# Patient Record
Sex: FEMALE | Race: WHITE | Employment: OTHER | URBAN - NONMETROPOLITAN AREA
[De-identification: names, ages, dates, MRNs, and addresses within clinical notes are randomized per-mention and may not be internally consistent; named-entity substitution may affect disease eponyms.]

---

## 2017-05-10 ENCOUNTER — TELEPHONE (OUTPATIENT)
Dept: GENERAL RADIOLOGY | Age: 67
End: 2017-05-10

## 2017-05-11 DIAGNOSIS — Z12.31 VISIT FOR SCREENING MAMMOGRAM: Primary | ICD-10-CM

## 2017-05-16 ENCOUNTER — OFFICE VISIT (OUTPATIENT)
Dept: OBGYN | Age: 67
End: 2017-05-16
Payer: COMMERCIAL

## 2017-05-16 ENCOUNTER — HOSPITAL ENCOUNTER (OUTPATIENT)
Age: 67
Setting detail: SPECIMEN
Discharge: HOME | End: 2017-05-16
Payer: MEDICARE

## 2017-05-16 VITALS
HEIGHT: 65 IN | WEIGHT: 170.8 LBS | BODY MASS INDEX: 28.46 KG/M2 | DIASTOLIC BLOOD PRESSURE: 62 MMHG | SYSTOLIC BLOOD PRESSURE: 118 MMHG | HEART RATE: 82 BPM

## 2017-05-16 DIAGNOSIS — Z01.419 WELL FEMALE EXAM WITH ROUTINE GYNECOLOGICAL EXAM: Primary | ICD-10-CM

## 2017-05-16 DIAGNOSIS — Z12.4 CERVICAL CANCER SCREENING: ICD-10-CM

## 2017-05-16 DIAGNOSIS — Z12.31 SCREENING MAMMOGRAM, ENCOUNTER FOR: ICD-10-CM

## 2017-05-16 PROCEDURE — 99397 PER PM REEVAL EST PAT 65+ YR: CPT | Performed by: NURSE PRACTITIONER

## 2017-05-16 RX ORDER — MEDROXYPROGESTERONE ACETATE 2.5 MG/1
TABLET ORAL
Qty: 90 TABLET | Refills: 4 | Status: SHIPPED | OUTPATIENT
Start: 2017-05-16 | End: 2020-05-26 | Stop reason: ALTCHOICE

## 2017-05-16 RX ORDER — ESTRADIOL 0.5 MG/1
TABLET ORAL
Qty: 90 TABLET | Refills: 4 | Status: SHIPPED | OUTPATIENT
Start: 2017-05-16 | End: 2020-05-26 | Stop reason: ALTCHOICE

## 2017-05-22 LAB — CYTOLOGY REPORT: NORMAL

## 2017-05-30 ENCOUNTER — TELEPHONE (OUTPATIENT)
Dept: GENERAL RADIOLOGY | Age: 67
End: 2017-05-30

## 2017-06-02 ENCOUNTER — INITIAL CONSULT (OUTPATIENT)
Dept: NEUROSURGERY | Age: 67
End: 2017-06-02
Payer: COMMERCIAL

## 2017-06-02 VITALS
HEART RATE: 66 BPM | HEIGHT: 66 IN | SYSTOLIC BLOOD PRESSURE: 112 MMHG | BODY MASS INDEX: 27.48 KG/M2 | DIASTOLIC BLOOD PRESSURE: 67 MMHG | WEIGHT: 171 LBS

## 2017-06-02 DIAGNOSIS — R93.89 ABNORMAL PELVIC ULTRASOUND: Primary | ICD-10-CM

## 2017-06-02 DIAGNOSIS — M54.16 LUMBAR RADICULOPATHY: Primary | ICD-10-CM

## 2017-06-02 PROCEDURE — 99243 OFF/OP CNSLTJ NEW/EST LOW 30: CPT | Performed by: NEUROLOGICAL SURGERY

## 2017-06-02 ASSESSMENT — VISUAL ACUITY: VA_NORMAL: 1

## 2017-06-14 DIAGNOSIS — Z01.818 PREOP TESTING: ICD-10-CM

## 2017-06-14 LAB
-: NORMAL
AMORPHOUS: NORMAL
BACTERIA: NORMAL
BILIRUBIN URINE: NEGATIVE
CASTS UA: NORMAL /LPF (ref 0–2)
COLOR: NORMAL
COMMENT UA: NORMAL
CRYSTALS, UA: NORMAL /HPF
EPITHELIAL CELLS UA: NORMAL /HPF (ref 0–5)
GLUCOSE URINE: NEGATIVE
KETONES, URINE: NEGATIVE
LEUKOCYTE ESTERASE, URINE: NEGATIVE
MUCUS: NORMAL
NITRITE, URINE: NEGATIVE
OTHER OBSERVATIONS UA: NORMAL
PH UA: 6 (ref 5–6)
PROTEIN UA: NEGATIVE
RBC UA: NORMAL /HPF (ref 0–4)
RENAL EPITHELIAL, UA: NORMAL /HPF
SPECIFIC GRAVITY UA: 1.02 (ref 1.01–1.02)
TRICHOMONAS: NORMAL
TURBIDITY: NORMAL
URINE HGB: NEGATIVE
UROBILINOGEN, URINE: NORMAL
WBC UA: NORMAL /HPF (ref 0–4)
YEAST: NORMAL

## 2017-06-14 PROCEDURE — 93040 RHYTHM ECG WITH REPORT: CPT | Performed by: INTERNAL MEDICINE

## 2017-06-20 ENCOUNTER — ANESTHESIA EVENT (OUTPATIENT)
Dept: OPERATING ROOM | Age: 67
DRG: 516 | End: 2017-06-20
Payer: COMMERCIAL

## 2017-06-20 RX ORDER — DOXYCYCLINE HYCLATE 100 MG/1
100 CAPSULE ORAL 2 TIMES DAILY
Status: ON HOLD | COMMUNITY
End: 2017-06-23 | Stop reason: HOSPADM

## 2017-06-21 ENCOUNTER — HOSPITAL ENCOUNTER (INPATIENT)
Age: 67
LOS: 2 days | Discharge: HOME | DRG: 516 | End: 2017-06-23
Attending: NEUROLOGICAL SURGERY | Admitting: NEUROLOGICAL SURGERY
Payer: COMMERCIAL

## 2017-06-21 ENCOUNTER — ANESTHESIA (OUTPATIENT)
Dept: OPERATING ROOM | Age: 67
DRG: 516 | End: 2017-06-21
Payer: COMMERCIAL

## 2017-06-21 ENCOUNTER — APPOINTMENT (OUTPATIENT)
Dept: GENERAL RADIOLOGY | Age: 67
DRG: 516 | End: 2017-06-21
Attending: NEUROLOGICAL SURGERY
Payer: COMMERCIAL

## 2017-06-21 VITALS — DIASTOLIC BLOOD PRESSURE: 58 MMHG | SYSTOLIC BLOOD PRESSURE: 114 MMHG | OXYGEN SATURATION: 100 % | TEMPERATURE: 94.8 F

## 2017-06-21 PROCEDURE — 63030 LAMOT DCMPRN NRV RT 1 LMBR: CPT | Performed by: NEUROLOGICAL SURGERY

## 2017-06-21 PROCEDURE — 6370000000 HC RX 637 (ALT 250 FOR IP): Performed by: NEUROLOGICAL SURGERY

## 2017-06-21 PROCEDURE — 00Q20ZZ REPAIR DURA MATER, OPEN APPROACH: ICD-10-PCS | Performed by: NEUROLOGICAL SURGERY

## 2017-06-21 PROCEDURE — 2720000003 HC MISC SUTURE/STAPLES/RELOADS/ETC: Performed by: NEUROLOGICAL SURGERY

## 2017-06-21 PROCEDURE — C1729 CATH, DRAINAGE: HCPCS | Performed by: NEUROLOGICAL SURGERY

## 2017-06-21 PROCEDURE — 6360000002 HC RX W HCPCS: Performed by: ANESTHESIOLOGY

## 2017-06-21 PROCEDURE — 2580000003 HC RX 258: Performed by: ANESTHESIOLOGY

## 2017-06-21 PROCEDURE — 2720000010 HC SURG SUPPLY STERILE: Performed by: NEUROLOGICAL SURGERY

## 2017-06-21 PROCEDURE — 2500000003 HC RX 250 WO HCPCS: Performed by: NURSE ANESTHETIST, CERTIFIED REGISTERED

## 2017-06-21 PROCEDURE — 3600000004 HC SURGERY LEVEL 4 BASE: Performed by: NEUROLOGICAL SURGERY

## 2017-06-21 PROCEDURE — 7100000000 HC PACU RECOVERY - FIRST 15 MIN: Performed by: NEUROLOGICAL SURGERY

## 2017-06-21 PROCEDURE — 7100000001 HC PACU RECOVERY - ADDTL 15 MIN: Performed by: NEUROLOGICAL SURGERY

## 2017-06-21 PROCEDURE — 2500000003 HC RX 250 WO HCPCS: Performed by: NEUROLOGICAL SURGERY

## 2017-06-21 PROCEDURE — 3700000001 HC ADD 15 MINUTES (ANESTHESIA): Performed by: NEUROLOGICAL SURGERY

## 2017-06-21 PROCEDURE — 1200000000 HC SEMI PRIVATE

## 2017-06-21 PROCEDURE — 6360000002 HC RX W HCPCS: Performed by: NURSE ANESTHETIST, CERTIFIED REGISTERED

## 2017-06-21 PROCEDURE — 6360000002 HC RX W HCPCS

## 2017-06-21 PROCEDURE — 2580000003 HC RX 258: Performed by: NEUROLOGICAL SURGERY

## 2017-06-21 PROCEDURE — 72020 X-RAY EXAM OF SPINE 1 VIEW: CPT

## 2017-06-21 PROCEDURE — 87086 URINE CULTURE/COLONY COUNT: CPT

## 2017-06-21 PROCEDURE — 3600000014 HC SURGERY LEVEL 4 ADDTL 15MIN: Performed by: NEUROLOGICAL SURGERY

## 2017-06-21 PROCEDURE — 01NB0ZZ RELEASE LUMBAR NERVE, OPEN APPROACH: ICD-10-PCS | Performed by: NEUROLOGICAL SURGERY

## 2017-06-21 PROCEDURE — 3700000000 HC ANESTHESIA ATTENDED CARE: Performed by: NEUROLOGICAL SURGERY

## 2017-06-21 PROCEDURE — 6360000002 HC RX W HCPCS: Performed by: NEUROLOGICAL SURGERY

## 2017-06-21 RX ORDER — LIDOCAINE HYDROCHLORIDE 10 MG/ML
INJECTION, SOLUTION EPIDURAL; INFILTRATION; INTRACAUDAL; PERINEURAL PRN
Status: DISCONTINUED | OUTPATIENT
Start: 2017-06-21 | End: 2017-06-21 | Stop reason: SDUPTHER

## 2017-06-21 RX ORDER — ACETAMINOPHEN 325 MG/1
650 TABLET ORAL EVERY 4 HOURS PRN
Status: DISCONTINUED | OUTPATIENT
Start: 2017-06-21 | End: 2017-06-23 | Stop reason: HOSPADM

## 2017-06-21 RX ORDER — NEOSTIGMINE METHYLSULFATE 1 MG/ML
INJECTION, SOLUTION INTRAVENOUS PRN
Status: DISCONTINUED | OUTPATIENT
Start: 2017-06-21 | End: 2017-06-21 | Stop reason: SDUPTHER

## 2017-06-21 RX ORDER — SODIUM CHLORIDE 0.9 % (FLUSH) 0.9 %
10 SYRINGE (ML) INJECTION EVERY 12 HOURS SCHEDULED
Status: DISCONTINUED | OUTPATIENT
Start: 2017-06-21 | End: 2017-06-23 | Stop reason: HOSPADM

## 2017-06-21 RX ORDER — OXYCODONE HYDROCHLORIDE AND ACETAMINOPHEN 5; 325 MG/1; MG/1
1 TABLET ORAL EVERY 4 HOURS PRN
Status: DISCONTINUED | OUTPATIENT
Start: 2017-06-21 | End: 2017-06-23 | Stop reason: HOSPADM

## 2017-06-21 RX ORDER — IBUPROFEN 200 MG
200 TABLET ORAL EVERY 6 HOURS PRN
Status: ON HOLD | COMMUNITY
End: 2017-06-23 | Stop reason: HOSPADM

## 2017-06-21 RX ORDER — SODIUM CHLORIDE 0.9 % (FLUSH) 0.9 %
10 SYRINGE (ML) INJECTION PRN
Status: DISCONTINUED | OUTPATIENT
Start: 2017-06-21 | End: 2017-06-23 | Stop reason: HOSPADM

## 2017-06-21 RX ORDER — ESTRADIOL 1 MG/1
1 TABLET ORAL DAILY
Status: DISCONTINUED | OUTPATIENT
Start: 2017-06-21 | End: 2017-06-23 | Stop reason: HOSPADM

## 2017-06-21 RX ORDER — CYCLOBENZAPRINE HCL 10 MG
10 TABLET ORAL 3 TIMES DAILY
Status: DISCONTINUED | OUTPATIENT
Start: 2017-06-21 | End: 2017-06-23 | Stop reason: HOSPADM

## 2017-06-21 RX ORDER — ACETAMINOPHEN 500 MG
1000 TABLET ORAL EVERY 6 HOURS PRN
COMMUNITY
End: 2017-08-10 | Stop reason: ALTCHOICE

## 2017-06-21 RX ORDER — ROCURONIUM BROMIDE 10 MG/ML
INJECTION, SOLUTION INTRAVENOUS PRN
Status: DISCONTINUED | OUTPATIENT
Start: 2017-06-21 | End: 2017-06-21 | Stop reason: SDUPTHER

## 2017-06-21 RX ORDER — PROPOFOL 10 MG/ML
INJECTION, EMULSION INTRAVENOUS PRN
Status: DISCONTINUED | OUTPATIENT
Start: 2017-06-21 | End: 2017-06-21 | Stop reason: SDUPTHER

## 2017-06-21 RX ORDER — GLYCOPYRROLATE 0.2 MG/ML
INJECTION INTRAMUSCULAR; INTRAVENOUS PRN
Status: DISCONTINUED | OUTPATIENT
Start: 2017-06-21 | End: 2017-06-21 | Stop reason: SDUPTHER

## 2017-06-21 RX ORDER — FAMOTIDINE 20 MG/1
20 TABLET, FILM COATED ORAL 2 TIMES DAILY
Status: DISCONTINUED | OUTPATIENT
Start: 2017-06-21 | End: 2017-06-23 | Stop reason: HOSPADM

## 2017-06-21 RX ORDER — ONDANSETRON 2 MG/ML
INJECTION INTRAMUSCULAR; INTRAVENOUS PRN
Status: DISCONTINUED | OUTPATIENT
Start: 2017-06-21 | End: 2017-06-21 | Stop reason: SDUPTHER

## 2017-06-21 RX ORDER — CALCIUM CARBONATE 200(500)MG
1 TABLET,CHEWABLE ORAL DAILY
COMMUNITY
End: 2019-03-19 | Stop reason: ALTCHOICE

## 2017-06-21 RX ORDER — SODIUM CHLORIDE, SODIUM LACTATE, POTASSIUM CHLORIDE, CALCIUM CHLORIDE 600; 310; 30; 20 MG/100ML; MG/100ML; MG/100ML; MG/100ML
INJECTION, SOLUTION INTRAVENOUS CONTINUOUS
Status: DISCONTINUED | OUTPATIENT
Start: 2017-06-21 | End: 2017-06-23 | Stop reason: HOSPADM

## 2017-06-21 RX ORDER — DOCUSATE SODIUM 100 MG/1
100 CAPSULE, LIQUID FILLED ORAL 2 TIMES DAILY
Status: DISCONTINUED | OUTPATIENT
Start: 2017-06-21 | End: 2017-06-23 | Stop reason: HOSPADM

## 2017-06-21 RX ORDER — MAGNESIUM HYDROXIDE 1200 MG/15ML
LIQUID ORAL CONTINUOUS PRN
Status: DISCONTINUED | OUTPATIENT
Start: 2017-06-21 | End: 2017-06-21 | Stop reason: HOSPADM

## 2017-06-21 RX ORDER — FENTANYL CITRATE 50 UG/ML
INJECTION, SOLUTION INTRAMUSCULAR; INTRAVENOUS PRN
Status: DISCONTINUED | OUTPATIENT
Start: 2017-06-21 | End: 2017-06-21 | Stop reason: SDUPTHER

## 2017-06-21 RX ORDER — MEDROXYPROGESTERONE ACETATE 2.5 MG/1
2.5 TABLET ORAL DAILY
Status: DISCONTINUED | OUTPATIENT
Start: 2017-06-21 | End: 2017-06-23 | Stop reason: HOSPADM

## 2017-06-21 RX ORDER — FENTANYL CITRATE 50 UG/ML
25 INJECTION, SOLUTION INTRAMUSCULAR; INTRAVENOUS EVERY 5 MIN PRN
Status: DISCONTINUED | OUTPATIENT
Start: 2017-06-21 | End: 2017-06-21 | Stop reason: HOSPADM

## 2017-06-21 RX ORDER — OXYCODONE HYDROCHLORIDE AND ACETAMINOPHEN 5; 325 MG/1; MG/1
2 TABLET ORAL EVERY 4 HOURS PRN
Status: DISCONTINUED | OUTPATIENT
Start: 2017-06-21 | End: 2017-06-23 | Stop reason: HOSPADM

## 2017-06-21 RX ORDER — CALCIUM CARBONATE 200(500)MG
1 TABLET,CHEWABLE ORAL DAILY
Status: DISCONTINUED | OUTPATIENT
Start: 2017-06-21 | End: 2017-06-23 | Stop reason: HOSPADM

## 2017-06-21 RX ORDER — ONDANSETRON 2 MG/ML
4 INJECTION INTRAMUSCULAR; INTRAVENOUS ONCE
Status: COMPLETED | OUTPATIENT
Start: 2017-06-21 | End: 2017-06-21

## 2017-06-21 RX ORDER — ONDANSETRON 2 MG/ML
INJECTION INTRAMUSCULAR; INTRAVENOUS
Status: COMPLETED
Start: 2017-06-21 | End: 2017-06-21

## 2017-06-21 RX ORDER — SODIUM CHLORIDE 9 MG/ML
INJECTION, SOLUTION INTRAVENOUS CONTINUOUS
Status: DISCONTINUED | OUTPATIENT
Start: 2017-06-21 | End: 2017-06-23 | Stop reason: HOSPADM

## 2017-06-21 RX ORDER — ONDANSETRON 2 MG/ML
4 INJECTION INTRAMUSCULAR; INTRAVENOUS EVERY 6 HOURS PRN
Status: DISCONTINUED | OUTPATIENT
Start: 2017-06-21 | End: 2017-06-23 | Stop reason: HOSPADM

## 2017-06-21 RX ADMIN — Medication 2 G: at 08:49

## 2017-06-21 RX ADMIN — NEOSTIGMINE METHYLSULFATE 3 MG: 1 INJECTION INTRAVENOUS at 09:55

## 2017-06-21 RX ADMIN — Medication 10 ML: at 13:06

## 2017-06-21 RX ADMIN — PROPOFOL 100 MG: 10 INJECTION, EMULSION INTRAVENOUS at 08:18

## 2017-06-21 RX ADMIN — GLYCOPYRROLATE 0.6 MG: 0.2 INJECTION INTRAMUSCULAR; INTRAVENOUS at 09:55

## 2017-06-21 RX ADMIN — CYCLOBENZAPRINE 10 MG: 10 TABLET, FILM COATED ORAL at 20:19

## 2017-06-21 RX ADMIN — ROCURONIUM BROMIDE 30 MG: 10 INJECTION INTRAVENOUS at 08:18

## 2017-06-21 RX ADMIN — FENTANYL CITRATE 50 MCG: 50 INJECTION, SOLUTION INTRAMUSCULAR; INTRAVENOUS at 09:34

## 2017-06-21 RX ADMIN — ROCURONIUM BROMIDE 20 MG: 10 INJECTION INTRAVENOUS at 08:57

## 2017-06-21 RX ADMIN — FENTANYL CITRATE 50 MCG: 50 INJECTION, SOLUTION INTRAMUSCULAR; INTRAVENOUS at 08:58

## 2017-06-21 RX ADMIN — HYDROMORPHONE HYDROCHLORIDE 0.5 MG: 1 INJECTION, SOLUTION INTRAMUSCULAR; INTRAVENOUS; SUBCUTANEOUS at 13:06

## 2017-06-21 RX ADMIN — SODIUM CHLORIDE, POTASSIUM CHLORIDE, SODIUM LACTATE AND CALCIUM CHLORIDE: 600; 310; 30; 20 INJECTION, SOLUTION INTRAVENOUS at 07:29

## 2017-06-21 RX ADMIN — OXYCODONE HYDROCHLORIDE AND ACETAMINOPHEN 2 TABLET: 5; 325 TABLET ORAL at 23:07

## 2017-06-21 RX ADMIN — DEXTROSE MONOHYDRATE 2 G: 50 INJECTION, SOLUTION INTRAVENOUS at 16:39

## 2017-06-21 RX ADMIN — ONDANSETRON 4 MG: 2 INJECTION, SOLUTION INTRAMUSCULAR; INTRAVENOUS at 09:55

## 2017-06-21 RX ADMIN — LIDOCAINE HYDROCHLORIDE 50 MG: 10 INJECTION, SOLUTION EPIDURAL; INFILTRATION; INTRACAUDAL; PERINEURAL at 08:18

## 2017-06-21 RX ADMIN — FENTANYL CITRATE 150 MCG: 50 INJECTION, SOLUTION INTRAMUSCULAR; INTRAVENOUS at 08:18

## 2017-06-21 RX ADMIN — DOCUSATE SODIUM 100 MG: 100 CAPSULE ORAL at 20:19

## 2017-06-21 RX ADMIN — SODIUM CHLORIDE, POTASSIUM CHLORIDE, SODIUM LACTATE AND CALCIUM CHLORIDE: 600; 310; 30; 20 INJECTION, SOLUTION INTRAVENOUS at 08:13

## 2017-06-21 RX ADMIN — ONDANSETRON 4 MG: 2 INJECTION INTRAMUSCULAR; INTRAVENOUS at 11:15

## 2017-06-21 RX ADMIN — FENTANYL CITRATE 50 MCG: 50 INJECTION, SOLUTION INTRAMUSCULAR; INTRAVENOUS at 08:27

## 2017-06-21 RX ADMIN — ONDANSETRON 4 MG: 2 INJECTION INTRAMUSCULAR; INTRAVENOUS at 11:34

## 2017-06-21 RX ADMIN — SODIUM CHLORIDE: 9 INJECTION, SOLUTION INTRAVENOUS at 11:35

## 2017-06-21 RX ADMIN — CYCLOBENZAPRINE 10 MG: 10 TABLET, FILM COATED ORAL at 14:41

## 2017-06-21 RX ADMIN — FAMOTIDINE 20 MG: 20 TABLET, FILM COATED ORAL at 20:19

## 2017-06-21 ASSESSMENT — PAIN DESCRIPTION - PAIN TYPE
TYPE: SURGICAL PAIN
TYPE: SURGICAL PAIN

## 2017-06-21 ASSESSMENT — PAIN DESCRIPTION - PROGRESSION

## 2017-06-21 ASSESSMENT — PAIN DESCRIPTION - ONSET: ONSET: ON-GOING

## 2017-06-21 ASSESSMENT — PAIN SCALES - GENERAL
PAINLEVEL_OUTOF10: 5
PAINLEVEL_OUTOF10: 5
PAINLEVEL_OUTOF10: 2
PAINLEVEL_OUTOF10: 7
PAINLEVEL_OUTOF10: 5

## 2017-06-21 ASSESSMENT — PAIN DESCRIPTION - DESCRIPTORS
DESCRIPTORS: ACHING
DESCRIPTORS: SHOOTING

## 2017-06-21 ASSESSMENT — ENCOUNTER SYMPTOMS: SHORTNESS OF BREATH: 0

## 2017-06-21 ASSESSMENT — PAIN DESCRIPTION - ORIENTATION
ORIENTATION: LOWER;MID
ORIENTATION: LOWER

## 2017-06-21 ASSESSMENT — PAIN DESCRIPTION - FREQUENCY: FREQUENCY: CONTINUOUS

## 2017-06-21 ASSESSMENT — PAIN - FUNCTIONAL ASSESSMENT: PAIN_FUNCTIONAL_ASSESSMENT: 0-10

## 2017-06-21 ASSESSMENT — PAIN DESCRIPTION - LOCATION
LOCATION: BACK
LOCATION: BACK

## 2017-06-22 DIAGNOSIS — R93.89 ABNORMAL PELVIC ULTRASOUND: Primary | ICD-10-CM

## 2017-06-22 LAB
ABSOLUTE EOS #: 0 K/UL (ref 0–0.4)
ABSOLUTE LYMPH #: 1.4 K/UL (ref 1–4.8)
ABSOLUTE MONO #: 0.7 K/UL (ref 0.1–1.2)
ANION GAP SERPL CALCULATED.3IONS-SCNC: 12 MMOL/L (ref 9–17)
BASOPHILS # BLD: 0 %
BASOPHILS ABSOLUTE: 0 K/UL (ref 0–0.2)
BUN BLDV-MCNC: 9 MG/DL (ref 8–23)
BUN/CREAT BLD: ABNORMAL (ref 9–20)
CALCIUM SERPL-MCNC: 8.9 MG/DL (ref 8.6–10.4)
CHLORIDE BLD-SCNC: 108 MMOL/L (ref 98–107)
CO2: 22 MMOL/L (ref 20–31)
CREAT SERPL-MCNC: 0.73 MG/DL (ref 0.5–0.9)
CULTURE: NO GROWTH
CULTURE: NORMAL
DIFFERENTIAL TYPE: ABNORMAL
EOSINOPHILS RELATIVE PERCENT: 0 %
GFR AFRICAN AMERICAN: >60 ML/MIN
GFR NON-AFRICAN AMERICAN: >60 ML/MIN
GFR SERPL CREATININE-BSD FRML MDRD: ABNORMAL ML/MIN/{1.73_M2}
GFR SERPL CREATININE-BSD FRML MDRD: ABNORMAL ML/MIN/{1.73_M2}
GLUCOSE BLD-MCNC: 128 MG/DL (ref 70–99)
HCT VFR BLD CALC: 37 % (ref 36–46)
HEMOGLOBIN: 12.6 G/DL (ref 12–16)
LYMPHOCYTES # BLD: 16 %
Lab: NORMAL
MCH RBC QN AUTO: 31.8 PG (ref 26–34)
MCHC RBC AUTO-ENTMCNC: 34.1 G/DL (ref 31–37)
MCV RBC AUTO: 93.2 FL (ref 80–100)
MONOCYTES # BLD: 8 %
PDW BLD-RTO: 13.2 % (ref 12.5–15.4)
PLATELET # BLD: 166 K/UL (ref 140–450)
PLATELET ESTIMATE: ABNORMAL
PMV BLD AUTO: 10 FL (ref 6–12)
POTASSIUM SERPL-SCNC: 4.1 MMOL/L (ref 3.7–5.3)
RBC # BLD: 3.97 M/UL (ref 4–5.2)
RBC # BLD: ABNORMAL 10*6/UL
SEG NEUTROPHILS: 76 %
SEGMENTED NEUTROPHILS ABSOLUTE COUNT: 6.7 K/UL (ref 1.8–7.7)
SODIUM BLD-SCNC: 142 MMOL/L (ref 135–144)
SPECIMEN DESCRIPTION: NORMAL
STATUS: NORMAL
WBC # BLD: 8.9 K/UL (ref 3.5–11)
WBC # BLD: ABNORMAL 10*3/UL

## 2017-06-22 PROCEDURE — 6370000000 HC RX 637 (ALT 250 FOR IP): Performed by: NEUROLOGICAL SURGERY

## 2017-06-22 PROCEDURE — G8988 SELF CARE GOAL STATUS: HCPCS

## 2017-06-22 PROCEDURE — 97110 THERAPEUTIC EXERCISES: CPT

## 2017-06-22 PROCEDURE — 6360000002 HC RX W HCPCS: Performed by: NEUROLOGICAL SURGERY

## 2017-06-22 PROCEDURE — G8987 SELF CARE CURRENT STATUS: HCPCS

## 2017-06-22 PROCEDURE — 97162 PT EVAL MOD COMPLEX 30 MIN: CPT

## 2017-06-22 PROCEDURE — G8978 MOBILITY CURRENT STATUS: HCPCS

## 2017-06-22 PROCEDURE — 97165 OT EVAL LOW COMPLEX 30 MIN: CPT

## 2017-06-22 PROCEDURE — 1200000000 HC SEMI PRIVATE

## 2017-06-22 PROCEDURE — 80048 BASIC METABOLIC PNL TOTAL CA: CPT

## 2017-06-22 PROCEDURE — G8979 MOBILITY GOAL STATUS: HCPCS

## 2017-06-22 PROCEDURE — 97535 SELF CARE MNGMENT TRAINING: CPT

## 2017-06-22 PROCEDURE — 36415 COLL VENOUS BLD VENIPUNCTURE: CPT

## 2017-06-22 PROCEDURE — 85025 COMPLETE CBC W/AUTO DIFF WBC: CPT

## 2017-06-22 PROCEDURE — 2580000003 HC RX 258: Performed by: NEUROLOGICAL SURGERY

## 2017-06-22 PROCEDURE — 97530 THERAPEUTIC ACTIVITIES: CPT

## 2017-06-22 RX ADMIN — OXYCODONE HYDROCHLORIDE AND ACETAMINOPHEN 1 TABLET: 5; 325 TABLET ORAL at 22:35

## 2017-06-22 RX ADMIN — CYCLOBENZAPRINE 10 MG: 10 TABLET, FILM COATED ORAL at 14:05

## 2017-06-22 RX ADMIN — Medication 1 TABLET: at 09:15

## 2017-06-22 RX ADMIN — CYCLOBENZAPRINE 10 MG: 10 TABLET, FILM COATED ORAL at 20:59

## 2017-06-22 RX ADMIN — SODIUM CHLORIDE: 9 INJECTION, SOLUTION INTRAVENOUS at 15:51

## 2017-06-22 RX ADMIN — ESTRADIOL 1 MG: 1 TABLET ORAL at 09:15

## 2017-06-22 RX ADMIN — DOCUSATE SODIUM 100 MG: 100 CAPSULE ORAL at 09:15

## 2017-06-22 RX ADMIN — CYCLOBENZAPRINE 10 MG: 10 TABLET, FILM COATED ORAL at 09:15

## 2017-06-22 RX ADMIN — DEXTROSE MONOHYDRATE 2 G: 50 INJECTION, SOLUTION INTRAVENOUS at 01:09

## 2017-06-22 RX ADMIN — DOCUSATE SODIUM 100 MG: 100 CAPSULE ORAL at 20:59

## 2017-06-22 RX ADMIN — MEDROXYPROGESTERONE ACETATE 2.5 MG: 2.5 TABLET ORAL at 09:15

## 2017-06-22 RX ADMIN — FAMOTIDINE 20 MG: 20 TABLET, FILM COATED ORAL at 20:58

## 2017-06-22 RX ADMIN — OXYCODONE HYDROCHLORIDE AND ACETAMINOPHEN 2 TABLET: 5; 325 TABLET ORAL at 07:34

## 2017-06-22 RX ADMIN — SODIUM CHLORIDE: 9 INJECTION, SOLUTION INTRAVENOUS at 01:13

## 2017-06-22 ASSESSMENT — PAIN DESCRIPTION - LOCATION
LOCATION: BACK

## 2017-06-22 ASSESSMENT — PAIN DESCRIPTION - PROGRESSION
CLINICAL_PROGRESSION: NOT CHANGED
CLINICAL_PROGRESSION: GRADUALLY IMPROVING
CLINICAL_PROGRESSION: NOT CHANGED
CLINICAL_PROGRESSION: NOT CHANGED

## 2017-06-22 ASSESSMENT — PAIN DESCRIPTION - PAIN TYPE
TYPE: SURGICAL PAIN

## 2017-06-22 ASSESSMENT — PAIN SCALES - GENERAL
PAINLEVEL_OUTOF10: 4
PAINLEVEL_OUTOF10: 3
PAINLEVEL_OUTOF10: 2
PAINLEVEL_OUTOF10: 4
PAINLEVEL_OUTOF10: 2
PAINLEVEL_OUTOF10: 4
PAINLEVEL_OUTOF10: 1
PAINLEVEL_OUTOF10: 4
PAINLEVEL_OUTOF10: 1

## 2017-06-22 ASSESSMENT — PAIN DESCRIPTION - DESCRIPTORS
DESCRIPTORS: CONSTANT
DESCRIPTORS: CONSTANT
DESCRIPTORS: ACHING
DESCRIPTORS: ACHING

## 2017-06-22 ASSESSMENT — PAIN DESCRIPTION - FREQUENCY
FREQUENCY: CONTINUOUS
FREQUENCY: CONTINUOUS

## 2017-06-22 ASSESSMENT — PAIN DESCRIPTION - ORIENTATION
ORIENTATION: LOWER
ORIENTATION: LOWER

## 2017-06-23 VITALS
BODY MASS INDEX: 26.86 KG/M2 | HEART RATE: 79 BPM | HEIGHT: 66 IN | OXYGEN SATURATION: 97 % | RESPIRATION RATE: 16 BRPM | SYSTOLIC BLOOD PRESSURE: 111 MMHG | DIASTOLIC BLOOD PRESSURE: 66 MMHG | TEMPERATURE: 97.6 F | WEIGHT: 167.11 LBS

## 2017-06-23 PROBLEM — M51.26 HERNIATED NUCLEUS PULPOSUS, L4-5: Status: ACTIVE | Noted: 2017-06-23

## 2017-06-23 PROCEDURE — 2580000003 HC RX 258: Performed by: NEUROLOGICAL SURGERY

## 2017-06-23 PROCEDURE — 97535 SELF CARE MNGMENT TRAINING: CPT

## 2017-06-23 PROCEDURE — 97116 GAIT TRAINING THERAPY: CPT

## 2017-06-23 PROCEDURE — 97530 THERAPEUTIC ACTIVITIES: CPT

## 2017-06-23 PROCEDURE — 6370000000 HC RX 637 (ALT 250 FOR IP): Performed by: NEUROLOGICAL SURGERY

## 2017-06-23 PROCEDURE — 97110 THERAPEUTIC EXERCISES: CPT

## 2017-06-23 RX ORDER — OXYCODONE HYDROCHLORIDE AND ACETAMINOPHEN 5; 325 MG/1; MG/1
1 TABLET ORAL EVERY 6 HOURS PRN
Qty: 30 TABLET | Refills: 0 | Status: SHIPPED | OUTPATIENT
Start: 2017-06-23 | End: 2017-08-10 | Stop reason: ALTCHOICE

## 2017-06-23 RX ORDER — CYCLOBENZAPRINE HCL 10 MG
10 TABLET ORAL 3 TIMES DAILY PRN
Qty: 30 TABLET | Refills: 0 | Status: SHIPPED | OUTPATIENT
Start: 2017-06-23 | End: 2017-07-03

## 2017-06-23 RX ORDER — CYCLOBENZAPRINE HCL 10 MG
10 TABLET ORAL 3 TIMES DAILY PRN
Qty: 30 TABLET | Refills: 0 | Status: SHIPPED | OUTPATIENT
Start: 2017-06-23 | End: 2017-06-23

## 2017-06-23 RX ORDER — PSEUDOEPHEDRINE HCL 30 MG
100 TABLET ORAL 2 TIMES DAILY
Qty: 30 CAPSULE | Refills: 0 | Status: SHIPPED | OUTPATIENT
Start: 2017-06-23 | End: 2017-08-10 | Stop reason: ALTCHOICE

## 2017-06-23 RX ADMIN — OXYCODONE HYDROCHLORIDE AND ACETAMINOPHEN 1 TABLET: 5; 325 TABLET ORAL at 04:40

## 2017-06-23 RX ADMIN — OXYCODONE HYDROCHLORIDE AND ACETAMINOPHEN 1 TABLET: 5; 325 TABLET ORAL at 15:10

## 2017-06-23 RX ADMIN — Medication 1 TABLET: at 08:35

## 2017-06-23 RX ADMIN — MEDROXYPROGESTERONE ACETATE 2.5 MG: 2.5 TABLET ORAL at 08:34

## 2017-06-23 RX ADMIN — SODIUM CHLORIDE: 9 INJECTION, SOLUTION INTRAVENOUS at 04:41

## 2017-06-23 RX ADMIN — ESTRADIOL 1 MG: 1 TABLET ORAL at 08:35

## 2017-06-23 RX ADMIN — CYCLOBENZAPRINE 10 MG: 10 TABLET, FILM COATED ORAL at 08:36

## 2017-06-23 RX ADMIN — DOCUSATE SODIUM 100 MG: 100 CAPSULE ORAL at 08:34

## 2017-06-23 RX ADMIN — FAMOTIDINE 20 MG: 20 TABLET, FILM COATED ORAL at 08:35

## 2017-06-23 RX ADMIN — CYCLOBENZAPRINE 10 MG: 10 TABLET, FILM COATED ORAL at 15:08

## 2017-06-23 ASSESSMENT — PAIN DESCRIPTION - FREQUENCY
FREQUENCY: CONTINUOUS
FREQUENCY: CONTINUOUS

## 2017-06-23 ASSESSMENT — PAIN DESCRIPTION - ORIENTATION
ORIENTATION: LEFT
ORIENTATION: LEFT

## 2017-06-23 ASSESSMENT — PAIN DESCRIPTION - DESCRIPTORS
DESCRIPTORS: ACHING;THROBBING
DESCRIPTORS: ACHING
DESCRIPTORS: ACHING;THROBBING

## 2017-06-23 ASSESSMENT — PAIN DESCRIPTION - PAIN TYPE
TYPE: SURGICAL PAIN

## 2017-06-23 ASSESSMENT — PAIN DESCRIPTION - LOCATION
LOCATION: KNEE
LOCATION: KNEE;BACK
LOCATION: ARM

## 2017-06-23 ASSESSMENT — PAIN SCALES - GENERAL
PAINLEVEL_OUTOF10: 3
PAINLEVEL_OUTOF10: 1
PAINLEVEL_OUTOF10: 4
PAINLEVEL_OUTOF10: 4
PAINLEVEL_OUTOF10: 3
PAINLEVEL_OUTOF10: 1
PAINLEVEL_OUTOF10: 2

## 2017-06-23 ASSESSMENT — PAIN DESCRIPTION - PROGRESSION: CLINICAL_PROGRESSION: NOT CHANGED

## 2017-06-23 ASSESSMENT — PAIN DESCRIPTION - ONSET: ONSET: ON-GOING

## 2017-07-06 ENCOUNTER — OFFICE VISIT (OUTPATIENT)
Dept: NEUROSURGERY | Age: 67
End: 2017-07-06

## 2017-07-06 VITALS
HEIGHT: 65 IN | WEIGHT: 170 LBS | BODY MASS INDEX: 28.32 KG/M2 | SYSTOLIC BLOOD PRESSURE: 102 MMHG | DIASTOLIC BLOOD PRESSURE: 62 MMHG

## 2017-07-06 DIAGNOSIS — Z98.890 S/P LUMBAR MICRODISCECTOMY: Primary | ICD-10-CM

## 2017-07-06 PROCEDURE — 99024 POSTOP FOLLOW-UP VISIT: CPT | Performed by: NEUROLOGICAL SURGERY

## 2017-07-06 RX ORDER — CYCLOBENZAPRINE HCL 10 MG
1 TABLET ORAL 2 TIMES DAILY
COMMUNITY
Start: 2017-06-23 | End: 2017-08-10 | Stop reason: ALTCHOICE

## 2017-07-06 RX ORDER — DOXYCYCLINE HYCLATE 100 MG/1
100 CAPSULE ORAL 2 TIMES DAILY
COMMUNITY
End: 2017-08-10 | Stop reason: ALTCHOICE

## 2017-07-06 RX ORDER — OXYCODONE HYDROCHLORIDE AND ACETAMINOPHEN 5; 325 MG/1; MG/1
1 TABLET ORAL EVERY 6 HOURS PRN
Qty: 30 TABLET | Refills: 0 | Status: SHIPPED | OUTPATIENT
Start: 2017-07-06 | End: 2017-07-13

## 2017-07-07 ASSESSMENT — VISUAL ACUITY: VA_NORMAL: 1

## 2017-08-10 ENCOUNTER — OFFICE VISIT (OUTPATIENT)
Dept: NEUROSURGERY | Age: 67
End: 2017-08-10

## 2017-08-10 VITALS
HEIGHT: 65 IN | SYSTOLIC BLOOD PRESSURE: 124 MMHG | HEART RATE: 75 BPM | BODY MASS INDEX: 28.49 KG/M2 | WEIGHT: 171 LBS | DIASTOLIC BLOOD PRESSURE: 70 MMHG

## 2017-08-10 DIAGNOSIS — Z98.890 S/P LUMBAR MICRODISCECTOMY: Primary | ICD-10-CM

## 2017-08-10 PROCEDURE — 99024 POSTOP FOLLOW-UP VISIT: CPT | Performed by: NEUROLOGICAL SURGERY

## 2017-08-11 ASSESSMENT — VISUAL ACUITY: VA_NORMAL: 1

## 2017-10-25 ENCOUNTER — TELEPHONE (OUTPATIENT)
Dept: OBGYN | Age: 67
End: 2017-10-25

## 2018-05-08 ENCOUNTER — TELEPHONE (OUTPATIENT)
Dept: NEUROSURGERY | Age: 68
End: 2018-05-08

## 2018-05-08 DIAGNOSIS — M54.16 LUMBAR RADICULOPATHY: Primary | ICD-10-CM

## 2018-05-17 ENCOUNTER — OFFICE VISIT (OUTPATIENT)
Dept: OBGYN | Age: 68
End: 2018-05-17
Payer: COMMERCIAL

## 2018-05-17 ENCOUNTER — HOSPITAL ENCOUNTER (OUTPATIENT)
Age: 68
Setting detail: SPECIMEN
Discharge: HOME | End: 2018-05-17
Payer: COMMERCIAL

## 2018-05-17 ENCOUNTER — HOSPITAL ENCOUNTER (OUTPATIENT)
Dept: MAMMOGRAPHY | Age: 68
Discharge: HOME | End: 2018-05-19
Payer: COMMERCIAL

## 2018-05-17 VITALS
SYSTOLIC BLOOD PRESSURE: 124 MMHG | BODY MASS INDEX: 28.96 KG/M2 | WEIGHT: 173.8 LBS | DIASTOLIC BLOOD PRESSURE: 78 MMHG | HEIGHT: 65 IN

## 2018-05-17 DIAGNOSIS — Z12.31 SCREENING MAMMOGRAM, ENCOUNTER FOR: Primary | ICD-10-CM

## 2018-05-17 DIAGNOSIS — Z01.419 WELL FEMALE EXAM WITH ROUTINE GYNECOLOGICAL EXAM: ICD-10-CM

## 2018-05-17 DIAGNOSIS — Z12.11 COLON CANCER SCREENING: ICD-10-CM

## 2018-05-17 DIAGNOSIS — R93.89 ABNORMAL PELVIC ULTRASOUND: ICD-10-CM

## 2018-05-17 DIAGNOSIS — Z12.31 SCREENING MAMMOGRAM, ENCOUNTER FOR: ICD-10-CM

## 2018-05-17 DIAGNOSIS — R12 HEARTBURN: ICD-10-CM

## 2018-05-17 PROCEDURE — G0145 SCR C/V CYTO,THINLAYER,RESCR: HCPCS

## 2018-05-17 PROCEDURE — 77067 SCR MAMMO BI INCL CAD: CPT

## 2018-05-17 PROCEDURE — 99397 PER PM REEVAL EST PAT 65+ YR: CPT | Performed by: NURSE PRACTITIONER

## 2018-05-17 RX ORDER — ESTRADIOL 0.5 MG/1
TABLET ORAL
Qty: 90 TABLET | Refills: 4 | Status: CANCELLED | OUTPATIENT
Start: 2018-05-17

## 2018-05-17 RX ORDER — DOXYCYCLINE HYCLATE 100 MG
TABLET ORAL
COMMUNITY
Start: 2018-02-26

## 2018-05-17 RX ORDER — MEDROXYPROGESTERONE ACETATE 2.5 MG/1
TABLET ORAL
Qty: 90 TABLET | Refills: 4 | Status: CANCELLED | OUTPATIENT
Start: 2018-05-17

## 2018-05-18 ENCOUNTER — HOSPITAL ENCOUNTER (OUTPATIENT)
Dept: MRI IMAGING | Age: 68
Discharge: HOME | End: 2018-05-20
Payer: COMMERCIAL

## 2018-05-18 DIAGNOSIS — M54.16 LUMBAR RADICULOPATHY: ICD-10-CM

## 2018-05-18 PROCEDURE — 72148 MRI LUMBAR SPINE W/O DYE: CPT

## 2018-05-29 ENCOUNTER — HOSPITAL ENCOUNTER (OUTPATIENT)
Dept: ULTRASOUND IMAGING | Age: 68
Discharge: HOME | End: 2018-05-31
Payer: COMMERCIAL

## 2018-05-29 DIAGNOSIS — R93.89 ABNORMAL PELVIC ULTRASOUND: ICD-10-CM

## 2018-05-29 PROCEDURE — 76856 US EXAM PELVIC COMPLETE: CPT

## 2018-06-05 ENCOUNTER — INITIAL CONSULT (OUTPATIENT)
Dept: SURGERY | Age: 68
End: 2018-06-05
Payer: COMMERCIAL

## 2018-06-05 VITALS
BODY MASS INDEX: 28.12 KG/M2 | DIASTOLIC BLOOD PRESSURE: 80 MMHG | HEIGHT: 66 IN | WEIGHT: 175 LBS | HEART RATE: 60 BPM | SYSTOLIC BLOOD PRESSURE: 136 MMHG | TEMPERATURE: 97.9 F

## 2018-06-05 DIAGNOSIS — R10.84 GENERALIZED ABDOMINAL PAIN: ICD-10-CM

## 2018-06-05 DIAGNOSIS — R12 HEARTBURN: Primary | ICD-10-CM

## 2018-06-05 DIAGNOSIS — Z12.11 COLON CANCER SCREENING: ICD-10-CM

## 2018-06-05 PROCEDURE — 99214 OFFICE O/P EST MOD 30 MIN: CPT | Performed by: SURGERY

## 2018-06-05 RX ORDER — POLYETHYLENE GLYCOL 3350, SODIUM CHLORIDE, SODIUM BICARBONATE, POTASSIUM CHLORIDE 420; 11.2; 5.72; 1.48 G/4L; G/4L; G/4L; G/4L
4000 POWDER, FOR SOLUTION ORAL ONCE
Qty: 4000 ML | Refills: 0 | Status: SHIPPED | OUTPATIENT
Start: 2018-06-05 | End: 2018-06-05

## 2018-06-08 ENCOUNTER — OFFICE VISIT (OUTPATIENT)
Dept: NEUROSURGERY | Age: 68
End: 2018-06-08
Payer: COMMERCIAL

## 2018-06-08 VITALS
WEIGHT: 170 LBS | HEIGHT: 65 IN | SYSTOLIC BLOOD PRESSURE: 118 MMHG | BODY MASS INDEX: 28.32 KG/M2 | DIASTOLIC BLOOD PRESSURE: 68 MMHG

## 2018-06-08 DIAGNOSIS — Z98.890 S/P LUMBAR MICRODISCECTOMY: Primary | ICD-10-CM

## 2018-06-08 PROCEDURE — 99213 OFFICE O/P EST LOW 20 MIN: CPT | Performed by: NEUROLOGICAL SURGERY

## 2018-06-08 RX ORDER — POLYETHYLENE GLYCOL 3350, SODIUM CHLORIDE, SODIUM BICARBONATE, POTASSIUM CHLORIDE 420; 11.2; 5.72; 1.48 G/4L; G/4L; G/4L; G/4L
POWDER, FOR SOLUTION ORAL
COMMUNITY
Start: 2018-06-06 | End: 2018-10-16 | Stop reason: ALTCHOICE

## 2018-06-14 LAB — CYTOLOGY REPORT: NORMAL

## 2018-07-16 ENCOUNTER — HOSPITAL ENCOUNTER (OUTPATIENT)
Age: 68
Setting detail: OUTPATIENT SURGERY
Discharge: HOME | End: 2018-07-16
Attending: SURGERY | Admitting: SURGERY
Payer: COMMERCIAL

## 2018-07-16 ENCOUNTER — ANESTHESIA (OUTPATIENT)
Dept: OPERATING ROOM | Age: 68
End: 2018-07-16
Payer: COMMERCIAL

## 2018-07-16 ENCOUNTER — ANESTHESIA EVENT (OUTPATIENT)
Dept: OPERATING ROOM | Age: 68
End: 2018-07-16
Payer: COMMERCIAL

## 2018-07-16 VITALS
OXYGEN SATURATION: 99 % | TEMPERATURE: 97.4 F | RESPIRATION RATE: 18 BRPM | HEIGHT: 65 IN | DIASTOLIC BLOOD PRESSURE: 69 MMHG | WEIGHT: 169 LBS | BODY MASS INDEX: 28.16 KG/M2 | HEART RATE: 62 BPM | SYSTOLIC BLOOD PRESSURE: 150 MMHG

## 2018-07-16 VITALS — DIASTOLIC BLOOD PRESSURE: 67 MMHG | SYSTOLIC BLOOD PRESSURE: 149 MMHG | OXYGEN SATURATION: 98 %

## 2018-07-16 PROCEDURE — 2500000003 HC RX 250 WO HCPCS: Performed by: NURSE ANESTHETIST, CERTIFIED REGISTERED

## 2018-07-16 PROCEDURE — 3700000000 HC ANESTHESIA ATTENDED CARE: Performed by: SURGERY

## 2018-07-16 PROCEDURE — 3700000001 HC ADD 15 MINUTES (ANESTHESIA): Performed by: SURGERY

## 2018-07-16 PROCEDURE — 88305 TISSUE EXAM BY PATHOLOGIST: CPT

## 2018-07-16 PROCEDURE — 7100000011 HC PHASE II RECOVERY - ADDTL 15 MIN: Performed by: SURGERY

## 2018-07-16 PROCEDURE — 2580000003 HC RX 258: Performed by: SURGERY

## 2018-07-16 PROCEDURE — 00813 ANES UPR LWR GI NDSC PX: CPT | Performed by: NURSE ANESTHETIST, CERTIFIED REGISTERED

## 2018-07-16 PROCEDURE — 45378 DIAGNOSTIC COLONOSCOPY: CPT | Performed by: SURGERY

## 2018-07-16 PROCEDURE — 88342 IMHCHEM/IMCYTCHM 1ST ANTB: CPT

## 2018-07-16 PROCEDURE — 2709999900 HC NON-CHARGEABLE SUPPLY: Performed by: SURGERY

## 2018-07-16 PROCEDURE — 43239 EGD BIOPSY SINGLE/MULTIPLE: CPT | Performed by: SURGERY

## 2018-07-16 PROCEDURE — 3609012400 HC EGD TRANSORAL BIOPSY SINGLE/MULTIPLE: Performed by: SURGERY

## 2018-07-16 PROCEDURE — 7100000010 HC PHASE II RECOVERY - FIRST 15 MIN: Performed by: SURGERY

## 2018-07-16 PROCEDURE — 3609027000 HC COLONOSCOPY: Performed by: SURGERY

## 2018-07-16 PROCEDURE — 6360000002 HC RX W HCPCS: Performed by: NURSE ANESTHETIST, CERTIFIED REGISTERED

## 2018-07-16 RX ORDER — LIDOCAINE HYDROCHLORIDE 40 MG/ML
INJECTION, SOLUTION RETROBULBAR; TOPICAL PRN
Status: DISCONTINUED | OUTPATIENT
Start: 2018-07-16 | End: 2018-07-16 | Stop reason: SDUPTHER

## 2018-07-16 RX ORDER — PROPOFOL 10 MG/ML
INJECTION, EMULSION INTRAVENOUS PRN
Status: DISCONTINUED | OUTPATIENT
Start: 2018-07-16 | End: 2018-07-16 | Stop reason: SDUPTHER

## 2018-07-16 RX ORDER — OMEPRAZOLE 20 MG/1
20 CAPSULE, DELAYED RELEASE ORAL DAILY
Qty: 30 CAPSULE | Refills: 3 | Status: ON HOLD | OUTPATIENT
Start: 2018-07-16 | End: 2019-04-10

## 2018-07-16 RX ORDER — SODIUM CHLORIDE, SODIUM LACTATE, POTASSIUM CHLORIDE, CALCIUM CHLORIDE 600; 310; 30; 20 MG/100ML; MG/100ML; MG/100ML; MG/100ML
INJECTION, SOLUTION INTRAVENOUS CONTINUOUS
Status: DISCONTINUED | OUTPATIENT
Start: 2018-07-16 | End: 2018-07-16 | Stop reason: HOSPADM

## 2018-07-16 RX ADMIN — SODIUM CHLORIDE, POTASSIUM CHLORIDE, SODIUM LACTATE AND CALCIUM CHLORIDE: 600; 310; 30; 20 INJECTION, SOLUTION INTRAVENOUS at 11:58

## 2018-07-16 RX ADMIN — PROPOFOL 400 MG: 10 INJECTION, EMULSION INTRAVENOUS at 12:02

## 2018-07-16 RX ADMIN — SODIUM CHLORIDE, POTASSIUM CHLORIDE, SODIUM LACTATE AND CALCIUM CHLORIDE: 600; 310; 30; 20 INJECTION, SOLUTION INTRAVENOUS at 10:25

## 2018-07-16 RX ADMIN — LIDOCAINE HYDROCHLORIDE 100 MG: 40 INJECTION, SOLUTION RETROBULBAR; TOPICAL at 12:02

## 2018-07-16 ASSESSMENT — PAIN SCALES - GENERAL
PAINLEVEL_OUTOF10: 0
PAINLEVEL_OUTOF10: 0

## 2018-07-16 ASSESSMENT — PAIN - FUNCTIONAL ASSESSMENT: PAIN_FUNCTIONAL_ASSESSMENT: 0-10

## 2018-07-16 NOTE — H&P
Cherrie Zuniga is a 78 y.o. female              CC:         Chief Complaint   Patient presents with    Heartburn    Abdominal Pain    Colonoscopy            HISTORY OF PRESENT ILLNESS:     EGD  Nausea: yes, 3 weeks  Vomiting: yes, occasional last 3 weeks  Heartburn:yes, daily for over a month  Dysphagia:no  Hematemesis:no  Epigastric pain:yes, daily for 2-3 weeks  Anemia: no  Previous work up date:none  Current Treatment:TUMS and Mylanta              Colonoscopy  Abd pain: left lower quadrant pain occasional  Anemia: no  Bloating:no  Diarrhea: no  Constipation: no  Melena: no  Hematochezia:no  Rectal Bleeding:no  Rectal/Anal Pain:no  Pruritus: no  Family history colon Cancer: no  Previous colon cancer: no  Previous Colon Polyp: no  Change in bowels: no  Decrease caliber of stool: no  Change in color of stool: no     Previous work up date: none     Patient is here for heartburn. She gets it every day. She's had for about 3 or 4 months. She takes many Tums for. She also has some epigastric pain. The last 3 weeks, as well as some left lower quadrant pain. Her bowels have been okay with no change. She denies any melena or rectal bleeding. She is 78years old and never had a screening colonoscopy.                  Review of Systems:     General:  Fever: Negative  Weight Change:Negative  Night Sweats: Positive     Eye:  Blurry Vision:Negative  Double Vision: Negative     Ent:  Headaches: Negative  Sore throat: Negative     Allergy/Immunology:  Hives: Negative  Latex allergy: Negative     Hematology/Lymphatic:  Bleeding Problems: Negative  Blood Clots: vein strip BLE  Swollen Lymph Nodes: Negative     Lungs:  Cough: Negative  SOB: Negative  Wheezing:Negative     Cardiovascular:  Chest Pain: Negative  Palpitations:Negative     GI:   Decreased Appetite: Positive  Heartburn: Positive  Dysphagia: Negative  Nausea/Vomiting: Positive  Abdominal Pain: Positive  Change in Bowels:Negative  Constipation: Negative  Diarrhea: Negative  Rectal Bleeding: Negative     :   Dysuria: Negative  Increase Urinary Frequency/Urgency: Negative     Neuro:  Seizures: Negative  Confusion: Negative              PAST MEDICAL HISTORY:     Family History          Family History   Problem Relation Age of Onset    Uterine Cancer Mother      Obesity Mother      Hypertension Mother      Diabetes Mother         NIDDM    Breast Cancer Maternal Aunt      Other Brother 32        IN TRAIN VERSUS TRUCK ACCIDENT         Social History   Social History            Social History    Marital status:        Spouse name: N/A    Number of children: N/A    Years of education: N/A          Occupational History    Not on file. Social History Main Topics    Smoking status: Never Smoker    Smokeless tobacco: Never Used    Alcohol use No    Drug use: No    Sexual activity: No         Comment: Menarche age 16   Menopause age 49           Other Topics Concern    Not on file          Social History Narrative    No narrative on file         Past Surgical History   Past Surgical History:   Procedure Laterality Date    BACK SURGERY Left 2017     microdiscectomy L4-5    DILATION AND CURETTAGE OF UTERUS   1980    LAMINECTOMY Left 2017     MICRODISCECTOMY L4-5, (PRONE), BRYANT TABLE, LIBERTAD FRAME, C-ARM, MICROSCOPE performed by Abimael Talavera MD at [facility] Right      Removal of birth [place]. -RT AXILLIA    TUBAL LIGATION            Past Medical History        Past Medical History:   Diagnosis Date    Cyst of ovary 2008     LEFT    H/O heartburn       TUMs as needed    Hx of blood clots      RT LEG-DURRING VEIN STRIPPING    Lumbar radiculopathy      Rib fracture 2015     fell outside while walking dogs-LEFT    Rosacea 2009     CREAM AS NEEDED    Snores      Wears contact lenses                  Current Outpatient Prescriptions on File Prior to Visit Medication Sig Dispense Refill    doxycycline hyclate (VIBRA-TABS) 100 MG tablet          calcium carbonate (TUMS) 500 MG chewable tablet Take 1 tablet by mouth daily        estradiol (ESTRACE) 0.5 MG tablet TAKE 1 TABLET DAILY 90 tablet 4    medroxyPROGESTERone (PROVERA) 2.5 MG tablet TAKE 1 TABLET DAILY 90 tablet 4    calcium-vitamin D (OSCAL-500) 500-200 MG-UNIT per tablet Take 1 tablet by mouth daily LAST DOSE 06/11/2017          No current facility-administered medications on file prior to visit. Allergies as of 06/05/2018    (No Known Allergies)         PHYSICAL EXAM:    Blood pressure (!) 141/90, pulse 68, temperature 97.4 °F (36.3 °C), temperature source Temporal, resp. rate 18, height 5' 5\" (1.651 m), weight 169 lb (76.7 kg), last menstrual period 06/20/2007, SpO2 98 %, not currently breastfeeding. Gen:  A and O x 3, NAD, well nourished  Eyes:  Sclera non icterus, PERRL  Lungs:  CTA, symmetrical  Chest:  RRR, no murmurs  Abd:  Soft, NT, ND, no HSM, no bruits        ASSESS MENT:     1. Heartburn  2. Epigastric pain  3. Left lower abd pain  4. Takes tums only  5. No previous EGD or CS        PLAN:     1.   Set up for EGD and CS

## 2018-07-16 NOTE — ANESTHESIA PRE PROCEDURE
Department of Anesthesiology  Preprocedure Note       Name:  Kapil Castorena   Age:  78 y.o.  :  1950                                          MRN:  [id number]         Date:  2018      Surgeon: Amilcar Harrison):  Sonia Richards MD    Procedure: Procedure(s):  EGD  COLONOSCOPY    Medications prior to admission:   Prior to Admission medications    Medication Sig Start Date End Date Taking?  Authorizing Provider   polyethylene glycol-electrolytes (NULYTELY) 420 g solution  18  Yes Historical Provider, MD   bisacodyl (DULCOLAX) 5 MG EC tablet Take 2 tablets at noon on bowel prep day 18  Yes Sonia Richards MD   doxycycline hyclate (VIBRA-TABS) 100 MG tablet  18  Yes Historical Provider, MD   calcium carbonate (TUMS) 500 MG chewable tablet Take 1 tablet by mouth daily   Yes Historical Provider, MD   estradiol (ESTRACE) 0.5 MG tablet TAKE 1 TABLET DAILY 17  Yes [facility] APRN - CNP   medroxyPROGESTERone (PROVERA) 2.5 MG tablet TAKE 1 TABLET DAILY 17  Yes [facility] APRN - CNP   calcium-vitamin D (Quintella Gin) 500-200 MG-UNIT per tablet Take 1 tablet by mouth daily LAST DOSE 2017   Yes Historical Provider, MD       Current medications:    Current Facility-Administered Medications   Medication Dose Route Frequency Provider Last Rate Last Dose    lactated ringers infusion   Intravenous Continuous Sonia Richards  mL/hr at 18 1025         Allergies:  No Known Allergies    Problem List:    Patient Active Problem List   Diagnosis Code    Herniated nucleus pulposus, L4-5 M51.26       Past Medical History:        Diagnosis Date    Cyst of ovary     LEFT    H/O heartburn     TUMs as needed    Hx of blood clots     RT LEG-DURRING VEIN STRIPPING    Lumbar radiculopathy     Rib fracture 2015    fell outside while walking dogs-LEFT    Rosacea 2009    CREAM AS NEEDED    Snores     Wears contact lenses        Past Surgical History:        Procedure Laterality Date Results   Component Value Date    PROTIME 10.7 06/14/2017    INR 1.0 06/14/2017    APTT 28.0 06/14/2017       HCG (If Applicable): No results found for: PREGTESTUR, PREGSERUM, HCG, HCGQUANT     ABGs: No results found for: PHART, PO2ART, WPK2ZCX, EOZ9KWK, BEART, E7LEDNYJ     Type & Screen (If Applicable):  No results found for: [facility]    Anesthesia Evaluation  Patient summary reviewed and Nursing notes reviewed   history of anesthetic complications: PONV. Airway: Mallampati: I  TM distance: >3 FB   Neck ROM: full  Mouth opening: > = 3 FB Dental: normal exam         Pulmonary:Negative Pulmonary ROS breath sounds clear to auscultation                             Cardiovascular:  Exercise tolerance: good (>4 METS),       (-)  angina      Rhythm: regular  Rate: normal                    Neuro/Psych:   Negative Neuro/Psych ROS              GI/Hepatic/Renal:   (+) GERD: well controlled,           Endo/Other: Negative Endo/Other ROS                    Abdominal:           Vascular: negative vascular ROS. Anesthesia Plan      MAC and TIVA     ASA 2     (Patient has consented to sedation/MAC anesthesia. The following information was discussed with the pt and the pt stated understanding and had no further questions. The pt will received medication to produce sleepiness and decreased awareness during surgery. The pt will be semiconscious. The expected result is a decrease in anxiety and awareness with decreased discomfort during the procedure. Common risks: N/V, slowed breathing, and awareness. Uncommon Risks: Respiratory arrest requiring advanced airway management, stroke, or death.   )  Induction: intravenous. Anesthetic plan and risks discussed with patient.       Plan discussed with surgical team.                  ELANA Chopra CRNA   7/16/2018

## 2018-07-16 NOTE — ANESTHESIA POSTPROCEDURE EVALUATION
Department of Anesthesiology  Postprocedure Note    Patient: Margarita Price  MRN: [id number]  [place]: 1950  Date of evaluation: 7/16/2018  Time:  12:27 PM     Procedure Summary     Date:  07/16/18 Room / Location:  [address]    Anesthesia Start:  9770 Anesthesia Stop:  5914    Procedures:       COLONOSCOPY (N/A )      EGD BIOPSY (N/A ) Diagnosis:  (epigastric pain heartburn nausea vomiting, colon screening )    Surgeon:  Malik Torrez MD Responsible Provider:  ELANA Montiel CRNA    Anesthesia Type:  MAC, TIVA ASA Status:  2          Anesthesia Type: MAC, TIVA    Ignacio Phase I: Igncaio Score: 10    Ignacio Phase II: Ignacio Score: 9    Last vitals: Reviewed and per EMR flowsheets.        Anesthesia Post Evaluation    Patient location during evaluation: PACU  Patient participation: complete - patient participated  Level of consciousness: awake and alert  Pain score: 0  Airway patency: patent  Nausea & Vomiting: no nausea and no vomiting  Complications: no  Cardiovascular status: blood pressure returned to baseline and hemodynamically stable  Respiratory status: acceptable, spontaneous ventilation and room air  Hydration status: euvolemic

## 2018-07-16 NOTE — OP NOTE
Stomach, body biopsy: Active moderate chronic gastritis. 3.  Distal esophagus, biopsy:         Mucinous columnar epithelium with moderate chronic inflammation,   focal acute inflammation and reactive change. Negative for intestinal metaplasia and dysplasia. Plan:    1.   As above in op note plan            Electronically signed by Carolyn Alford MD  on 7/16/2018 at 11:57 AM

## 2018-07-19 LAB — SURGICAL PATHOLOGY REPORT: NORMAL

## 2018-08-06 ENCOUNTER — TELEPHONE (OUTPATIENT)
Dept: SURGERY | Age: 68
End: 2018-08-06

## 2018-08-06 DIAGNOSIS — A04.8 H. PYLORI INFECTION: Primary | ICD-10-CM

## 2018-08-07 NOTE — TELEPHONE ENCOUNTER
Patient is aware of EGD and colonoscopy results. History, health maintenance, recall and family physician updated. Patient is aware of positive H pylori results and need to  antibiotics.

## 2018-08-08 RX ORDER — AMOXICILLIN 500 MG/1
CAPSULE ORAL
Qty: 56 CAPSULE | Refills: 0 | Status: SHIPPED | OUTPATIENT
Start: 2018-08-08 | End: 2018-10-16 | Stop reason: ALTCHOICE

## 2018-08-08 RX ORDER — CLARITHROMYCIN 500 MG/1
500 TABLET, COATED ORAL 2 TIMES DAILY
Qty: 28 TABLET | Refills: 0 | Status: SHIPPED | OUTPATIENT
Start: 2018-08-08 | End: 2018-08-22

## 2018-10-16 ENCOUNTER — OFFICE VISIT (OUTPATIENT)
Dept: SURGERY | Age: 68
End: 2018-10-16
Payer: COMMERCIAL

## 2018-10-16 VITALS
WEIGHT: 170 LBS | DIASTOLIC BLOOD PRESSURE: 86 MMHG | BODY MASS INDEX: 28.32 KG/M2 | HEIGHT: 65 IN | HEART RATE: 60 BPM | SYSTOLIC BLOOD PRESSURE: 142 MMHG

## 2018-10-16 DIAGNOSIS — A04.8 H. PYLORI INFECTION: Primary | ICD-10-CM

## 2018-10-16 PROCEDURE — 99213 OFFICE O/P EST LOW 20 MIN: CPT | Performed by: SURGERY

## 2018-10-23 ENCOUNTER — TELEPHONE (OUTPATIENT)
Dept: SURGERY | Age: 68
End: 2018-10-23

## 2018-10-23 RX ORDER — OMEPRAZOLE 20 MG/1
20 CAPSULE, DELAYED RELEASE ORAL DAILY
Qty: 30 CAPSULE | Refills: 3 | Status: ON HOLD | OUTPATIENT
Start: 2018-10-23 | End: 2019-04-10

## 2018-10-23 RX ORDER — OMEPRAZOLE 20 MG/1
20 CAPSULE, DELAYED RELEASE ORAL DAILY
Qty: 30 CAPSULE | Refills: 3 | Status: SHIPPED | OUTPATIENT
Start: 2018-10-23 | End: 2018-10-23

## 2018-10-24 ENCOUNTER — HOSPITAL ENCOUNTER (OUTPATIENT)
Dept: LAB | Age: 68
Discharge: HOME | End: 2018-10-24
Payer: COMMERCIAL

## 2018-10-24 DIAGNOSIS — A04.8 H. PYLORI INFECTION: ICD-10-CM

## 2018-10-24 PROCEDURE — 83013 H PYLORI (C-13) BREATH: CPT

## 2018-10-24 PROCEDURE — 83014 H PYLORI DRUG ADMIN: CPT

## 2018-10-26 LAB — H PYLORI BREATH TEST: NEGATIVE

## 2018-11-21 ENCOUNTER — HOSPITAL ENCOUNTER (OUTPATIENT)
Dept: ULTRASOUND IMAGING | Age: 68
Discharge: HOME | End: 2018-11-23
Payer: COMMERCIAL

## 2018-11-21 DIAGNOSIS — R93.89 THICKENED ENDOMETRIUM: ICD-10-CM

## 2018-11-21 PROCEDURE — 76856 US EXAM PELVIC COMPLETE: CPT

## 2019-02-25 ENCOUNTER — TELEPHONE (OUTPATIENT)
Dept: SURGERY | Age: 69
End: 2019-02-25

## 2019-02-25 DIAGNOSIS — K29.50 CHRONIC GASTRITIS WITHOUT BLEEDING, UNSPECIFIED GASTRITIS TYPE: Primary | ICD-10-CM

## 2019-02-27 RX ORDER — OMEPRAZOLE 20 MG/1
CAPSULE, DELAYED RELEASE ORAL
Qty: 30 CAPSULE | Refills: 5 | Status: SHIPPED | OUTPATIENT
Start: 2019-02-27 | End: 2019-08-09 | Stop reason: SDUPTHER

## 2019-03-15 ENCOUNTER — TELEPHONE (OUTPATIENT)
Dept: OBGYN | Age: 69
End: 2019-03-15

## 2019-03-19 ENCOUNTER — HOSPITAL ENCOUNTER (OUTPATIENT)
Dept: ULTRASOUND IMAGING | Age: 69
Discharge: HOME | End: 2019-03-21
Payer: COMMERCIAL

## 2019-03-19 ENCOUNTER — OFFICE VISIT (OUTPATIENT)
Dept: OBGYN | Age: 69
End: 2019-03-19
Payer: COMMERCIAL

## 2019-03-19 VITALS
WEIGHT: 170 LBS | DIASTOLIC BLOOD PRESSURE: 78 MMHG | HEIGHT: 66 IN | BODY MASS INDEX: 27.32 KG/M2 | TEMPERATURE: 98.1 F | SYSTOLIC BLOOD PRESSURE: 122 MMHG | HEART RATE: 68 BPM

## 2019-03-19 DIAGNOSIS — N95.0 POSTMENOPAUSAL BLEEDING: ICD-10-CM

## 2019-03-19 DIAGNOSIS — N95.0 POSTMENOPAUSAL BLEEDING: Primary | ICD-10-CM

## 2019-03-19 PROCEDURE — 99213 OFFICE O/P EST LOW 20 MIN: CPT | Performed by: NURSE PRACTITIONER

## 2019-03-19 PROCEDURE — 76830 TRANSVAGINAL US NON-OB: CPT

## 2019-03-21 DIAGNOSIS — N84.0 ENDOMETRIAL POLYP: Primary | ICD-10-CM

## 2019-03-21 DIAGNOSIS — N95.0 POST-MENOPAUSAL BLEEDING: ICD-10-CM

## 2019-03-25 ENCOUNTER — OFFICE VISIT (OUTPATIENT)
Dept: OBGYN | Age: 69
End: 2019-03-25
Payer: COMMERCIAL

## 2019-03-25 VITALS
HEART RATE: 78 BPM | WEIGHT: 170 LBS | HEIGHT: 66 IN | DIASTOLIC BLOOD PRESSURE: 80 MMHG | SYSTOLIC BLOOD PRESSURE: 122 MMHG | BODY MASS INDEX: 27.32 KG/M2

## 2019-03-25 DIAGNOSIS — N84.0 ENDOMETRIAL POLYP: ICD-10-CM

## 2019-03-25 DIAGNOSIS — D25.1 INTRAMURAL LEIOMYOMA OF UTERUS: ICD-10-CM

## 2019-03-25 DIAGNOSIS — N95.0 POST-MENOPAUSAL BLEEDING: Primary | ICD-10-CM

## 2019-03-25 PROCEDURE — 99214 OFFICE O/P EST MOD 30 MIN: CPT | Performed by: OBSTETRICS & GYNECOLOGY

## 2019-04-08 ENCOUNTER — TELEPHONE (OUTPATIENT)
Dept: OBGYN | Age: 69
End: 2019-04-08

## 2019-04-08 NOTE — TELEPHONE ENCOUNTER
[facility] SPECIALTY [facility]    Pre-Operative Evaluation/Consultation    Name:  Brian Ledbetter                                         Age:  80 y.o. MRN:  [id number]       :  1950   Date:  2019         Sex: female    There were no encounter diagnoses. Surgeon:  Dr. Leonarda Tatum  Procedure (Planned):  D&C Hysteroscopy polypectomy   Date Scheduled surgery: 04/10/2019    Attending : No att. providers found    Primary Physician: Alejandra Smith  Cardiologist: None    Type of Anesthesia Requested: General    Patient Medical history:  No Known Allergies  Patient Active Problem List   Diagnosis    Herniated nucleus pulposus, L4-5     Past Medical History:   Diagnosis Date    Cyst of ovary     LEFT    H/O heartburn     TUMs as needed    Hx of blood clots     RT LEG-DURRING VEIN STRIPPING    Lumbar radiculopathy     Rib fracture 2015    fell outside while walking dogs-LEFT    Rosacea 2009    CREAM AS NEEDED    Snores     Wears contact lenses      Past Surgical History:   Procedure Laterality Date    BACK SURGERY Left 2017    microdiscectomy L4-5    DILATION AND CURETTAGE OF UTERUS      LAMINECTOMY Left 2017    MICRODISCECTOMY L4-5, (PRONE), BRYANT TABLE, LIBERTAD FRAME, C-ARM, MICROSCOPE performed by Armaan Dao MD at [facility] Right 1981    Removal of birth [place]. -RT AXILLIA    MS COLONOSCOPY FLX DX W/COLLJ SPEC WHEN PFRMD N/A 2018    COLONOSCOPY performed by Dimas Cho MD at [facility] ENDOSCOPY N/A 2018    EGD BIOPSY performed by Dimas Cho MD at [address] Use    Smoking status: Never Smoker    Smokeless tobacco: Never Used   Substance Use Topics    Alcohol use: No     Alcohol/week: 0.0 oz    Drug use: No     Medications:  Current Outpatient Medications   Medication Sig Dispense Refill    omeprazole (PRILOSEC) 20 MG delayed release capsule Take once daily. 30 capsule 5    omeprazole (PRILOSEC) 20 MG delayed release capsule Take 1 capsule by mouth daily 30 capsule 3    omeprazole (PRILOSEC) 20 MG delayed release capsule Take 1 capsule by mouth daily 30 capsule 3    doxycycline hyclate (VIBRA-TABS) 100 MG tablet       estradiol (ESTRACE) 0.5 MG tablet TAKE 1 TABLET DAILY 90 tablet 4    medroxyPROGESTERone (PROVERA) 2.5 MG tablet TAKE 1 TABLET DAILY 90 tablet 4    calcium-vitamin D (OSCAL-500) 500-200 MG-UNIT per tablet Take 1 tablet by mouth daily LAST DOSE 06/11/2017       No current facility-administered medications for this visit. Scheduled Meds:  Continuous Infusions:  PRN Meds:. Prior to Admission medications    Medication Sig Start Date End Date Taking? Authorizing Provider   omeprazole (PRILOSEC) 20 MG delayed release capsule Take once daily. 2/27/19   Rebecca Ramirez MD   omeprazole (PRILOSEC) 20 MG delayed release capsule Take 1 capsule by mouth daily 10/23/18   Rebecca Ramirez MD   omeprazole (PRILOSEC) 20 MG delayed release capsule Take 1 capsule by mouth daily 7/16/18   Rebecca Ramirez MD   doxycycline hyclate (VIBRA-TABS) 100 MG tablet  2/26/18   Historical Provider, MD   estradiol (ESTRACE) 0.5 MG tablet TAKE 1 TABLET DAILY 5/16/17   ELANA Adair CNP   medroxyPROGESTERone (PROVERA) 2.5 MG tablet TAKE 1 TABLET DAILY 5/16/17   ELANA Adair CNP   calcium-vitamin D (OSCAL-500) 500-200 MG-UNIT per tablet Take 1 tablet by mouth daily LAST DOSE 06/11/2017    Historical Provider, MD     Vital Signs (Current) [unfilled]    Weight:   Wt Readings from Last 1 Encounters:   03/25/19 170 lb (77.1 kg)     Height:   Ht Readings from Last 1 Encounters:   03/25/19 5' 5.51\" (1.664 m)      BMI:  There is no height or weight on file to calculate BMI. Estimated body mass index is 27.85 kg/m² as calculated from the following:    Height as of 3/25/19: 5' 5.51\" (1.664 m). Weight as of 3/25/19: 170 lb (77.1 kg).   body mass index is unknown because there is no height or weight on file. Cardiac Clearance: None   Medical Clearance:None   Appointment for surgery Clearance scheduled for:None     Preoperative Testing: These are the current and completed labs:  CBC:   Lab Results   Component Value Date    WBC 8.9 06/22/2017    RBC 3.97 06/22/2017    HGB 12.6 06/22/2017    HCT 37.0 06/22/2017    MCV 93.2 06/22/2017    RDW 13.2 06/22/2017     06/22/2017     CMP:   Lab Results   Component Value Date     06/22/2017    K 4.1 06/22/2017     06/22/2017    CO2 22 06/22/2017    BUN 9 06/22/2017    CREATININE 0.73 06/22/2017    GFRAA >60 06/22/2017    LABGLOM >60 06/22/2017    GLUCOSE 128 06/22/2017    CALCIUM 8.9 06/22/2017     POC Tests: No results for input(s): POCGLU, POCNA, POCK, POCCL, POCBUN, POCHEMO, POCHCT in the last 72 hours.   Neptali    Lab Results   Component Value Date    PROTIME 10.7 06/14/2017    INR 1.0 06/14/2017    APTT 28.0 08/73/3058     HCG (If Applicable) No results found for: PREGTESTUR, PREGSERUM, HCG, HCGQUANT   ABGs No results found for: PHART, PO2ART, OXO3SPE, XXE9VBI, BEART, T0XBPXAE   Type & Screen (If Applicable)  No results found for: Karen Zarate    Additional ordered pre-operative testing:  [x]CBC    []ABG      [] BMP   []URINALYSIS   [x]CMP    [x]HCG   []COAGS PT/INR  []T&C  []LFTs   [x]TYPE AND SCREEN    [] EKG  [] Chest X-Ray  [] Other Radiology    [] Sent to Hospitalist None  [] Sent to Anesthesia for your review: None   [] Additional Orders: None     Comments:None   Requests: No Special requests    Signed: Billye Fleischer, LPN 8/3/5677 3:05 AM

## 2019-04-08 NOTE — TELEPHONE ENCOUNTER
Lets get a baseline EKG please. If EKG is normal then pt is cleared for CASIE. If abnormalities are discovered then please get Cardiac Clearance.     ELANA Babin - CRNA,MSN,4/8/2019 10:06 AM  Staff Anesthetist  [facility]  Dept of Anesthesia

## 2019-04-09 ENCOUNTER — HOSPITAL ENCOUNTER (OUTPATIENT)
Dept: NON INVASIVE DIAGNOSTICS | Age: 69
Discharge: HOME | End: 2019-04-09
Payer: COMMERCIAL

## 2019-04-09 ENCOUNTER — PREP FOR PROCEDURE (OUTPATIENT)
Dept: OBGYN | Age: 69
End: 2019-04-09

## 2019-04-09 ENCOUNTER — HOSPITAL ENCOUNTER (OUTPATIENT)
Dept: LAB | Age: 69
Discharge: HOME | End: 2019-04-09
Payer: COMMERCIAL

## 2019-04-09 DIAGNOSIS — Z01.818 PRE-OP TESTING: ICD-10-CM

## 2019-04-09 DIAGNOSIS — Z01.818 PRE-OP TESTING: Primary | ICD-10-CM

## 2019-04-09 LAB
ABO/RH: NORMAL
ALBUMIN SERPL-MCNC: 4.3 G/DL (ref 3.5–5.2)
ALBUMIN/GLOBULIN RATIO: 1.5 (ref 1–2.5)
ALP BLD-CCNC: 93 U/L (ref 35–104)
ALT SERPL-CCNC: 19 U/L (ref 5–33)
ANION GAP SERPL CALCULATED.3IONS-SCNC: 10 MMOL/L (ref 9–17)
ANTIBODY SCREEN: NEGATIVE
ARM BAND NUMBER: NORMAL
AST SERPL-CCNC: 20 U/L
BILIRUB SERPL-MCNC: 0.4 MG/DL (ref 0.3–1.2)
BUN BLDV-MCNC: 12 MG/DL (ref 8–23)
BUN/CREAT BLD: 17 (ref 9–20)
CALCIUM SERPL-MCNC: 9.3 MG/DL (ref 8.6–10.4)
CHLORIDE BLD-SCNC: 107 MMOL/L (ref 98–107)
CO2: 24 MMOL/L (ref 20–31)
CREAT SERPL-MCNC: 0.71 MG/DL (ref 0.5–0.9)
EKG ATRIAL RATE: 67 BPM
EKG P AXIS: 70 DEGREES
EKG P-R INTERVAL: 170 MS
EKG Q-T INTERVAL: 398 MS
EKG QRS DURATION: 86 MS
EKG QTC CALCULATION (BAZETT): 420 MS
EKG R AXIS: 16 DEGREES
EKG T AXIS: 62 DEGREES
EKG VENTRICULAR RATE: 67 BPM
EXPIRATION DATE: NORMAL
GFR AFRICAN AMERICAN: >60 ML/MIN
GFR NON-AFRICAN AMERICAN: >60 ML/MIN
GFR SERPL CREATININE-BSD FRML MDRD: NORMAL ML/MIN/{1.73_M2}
GFR SERPL CREATININE-BSD FRML MDRD: NORMAL ML/MIN/{1.73_M2}
GLUCOSE BLD-MCNC: 93 MG/DL (ref 70–99)
HCG QUALITATIVE: NEGATIVE
HCT VFR BLD CALC: 43 % (ref 36–46)
HEMOGLOBIN: 14.4 G/DL (ref 12–16)
MCH RBC QN AUTO: 31.4 PG (ref 26–34)
MCHC RBC AUTO-ENTMCNC: 33.5 G/DL (ref 31–37)
MCV RBC AUTO: 93.8 FL (ref 80–100)
NRBC AUTOMATED: NORMAL PER 100 WBC
PDW BLD-RTO: 13.4 % (ref 11–14.5)
PLATELET # BLD: 221 K/UL (ref 140–450)
PMV BLD AUTO: 10.1 FL (ref 6–12)
POTASSIUM SERPL-SCNC: 3.8 MMOL/L (ref 3.7–5.3)
RBC # BLD: 4.59 M/UL (ref 4–5.2)
SODIUM BLD-SCNC: 141 MMOL/L (ref 135–144)
TOTAL PROTEIN: 7.1 G/DL (ref 6.4–8.3)
WBC # BLD: 6.6 K/UL (ref 3.5–11)

## 2019-04-09 PROCEDURE — 84703 CHORIONIC GONADOTROPIN ASSAY: CPT

## 2019-04-09 PROCEDURE — 86901 BLOOD TYPING SEROLOGIC RH(D): CPT

## 2019-04-09 PROCEDURE — 85027 COMPLETE CBC AUTOMATED: CPT

## 2019-04-09 PROCEDURE — 86900 BLOOD TYPING SEROLOGIC ABO: CPT

## 2019-04-09 PROCEDURE — 93005 ELECTROCARDIOGRAM TRACING: CPT

## 2019-04-09 PROCEDURE — 86850 RBC ANTIBODY SCREEN: CPT

## 2019-04-09 PROCEDURE — 80053 COMPREHEN METABOLIC PANEL: CPT

## 2019-04-09 PROCEDURE — 36415 COLL VENOUS BLD VENIPUNCTURE: CPT

## 2019-04-09 RX ORDER — SODIUM CHLORIDE 0.9 % (FLUSH) 0.9 %
10 SYRINGE (ML) INJECTION EVERY 12 HOURS SCHEDULED
Status: CANCELLED | OUTPATIENT
Start: 2019-04-09

## 2019-04-09 RX ORDER — SODIUM CHLORIDE 0.9 % (FLUSH) 0.9 %
10 SYRINGE (ML) INJECTION PRN
Status: CANCELLED | OUTPATIENT
Start: 2019-04-09

## 2019-04-09 NOTE — H&P
Pre-operative Visit    Elan Sahu    2019  Primary Care Physician: Zeferino Bradley MD    HISTORY & PHYSICAL      2019       HOSPITAL: [facility]    HPI: Elan Sahu is a 80 y.o. female [id number] postmenopausal bleeding with endometrial polyp  HPI    No chief complaint on file. No diagnosis found. Patient Active Problem List   Diagnosis    Herniated nucleus pulposus, L4-5       OB History    Para Term  AB Living   3 3 2 0 0 2   SAB TAB Ectopic Molar Multiple Live Births   0 0 0 0 0 0      # Outcome Date GA Lbr Caesar/2nd Weight Sex Delivery Anes PTL Lv   3 Para     F       2 Term     F Vag-Spont      1 Term     M Vag-Spont        Past Medical History:   Diagnosis Date    Cyst of ovary     LEFT    H/O heartburn     TUMs as needed    Hx of blood clots     RT LEG-DURRING VEIN STRIPPING    Lumbar radiculopathy     Rib fracture 2015    fell outside while walking dogs-LEFT    Rosacea 2009    CREAM AS NEEDED    Snores     Wears contact lenses        Past Surgical History:   Procedure Laterality Date    BACK SURGERY Left 2017    microdiscectomy L4-5    DILATION AND CURETTAGE OF UTERUS  1980    LAMINECTOMY Left 2017    MICRODISCECTOMY L4-5, (PRONE), BRYANT TABLE, LIBERTAD FRAME, C-ARM, MICROSCOPE performed by Anders Mcginnis MD at [facility] Right     Removal of birth [place]. -RT AXILLIA    ND COLONOSCOPY FLX DX W/COLLJ SPEC WHEN PFRMD N/A 2018    COLONOSCOPY performed by Nadir Polk MD at [address] [place] [address] N/A 2018    EGD BIOPSY performed by Nadir Polk MD at [address] Marital status:       Spouse name: Not on file    Number of children: Not on file    Years of education: Not on file    Highest education level: Not on file   Occupational History    Not on file   Social Needs    Financial resource strain: Not on file    Food insecurity:     Worry: Not on file     Inability: Not on file    Transportation needs:     Medical: Not on file     Non-medical: Not on file   Tobacco Use    Smoking status: Never Smoker    Smokeless tobacco: Never Used   Substance and Sexual Activity    Alcohol use: No     Alcohol/week: 0.0 oz    Drug use: No    Sexual activity: Never     Birth control/protection: Post-menopausal     Comment: Menarche age 16   Menopause age 49   Lifestyle    Physical activity:     Days per week: Not on file     Minutes per session: Not on file    Stress: Not on file   Relationships    Social connections:     Talks on phone: Not on file     Gets together: Not on file     Attends Jain service: Not on file     Active member of club or organization: Not on file     Attends meetings of clubs or organizations: Not on file     Relationship status: Not on file    Intimate partner violence:     Fear of current or ex partner: Not on file     Emotionally abused: Not on file     Physically abused: Not on file     Forced sexual activity: Not on file   Other Topics Concern    Not on file   Social History Narrative    Not on file         MEDICATIONS:  Current Outpatient Medications   Medication Sig Dispense Refill    omeprazole (PRILOSEC) 20 MG delayed release capsule Take once daily. 30 capsule 5    omeprazole (PRILOSEC) 20 MG delayed release capsule Take 1 capsule by mouth daily 30 capsule 3    omeprazole (PRILOSEC) 20 MG delayed release capsule Take 1 capsule by mouth daily 30 capsule 3    doxycycline hyclate (VIBRA-TABS) 100 MG tablet       estradiol (ESTRACE) 0.5 MG tablet TAKE 1 TABLET DAILY 90 tablet 4    medroxyPROGESTERone (PROVERA) 2.5 MG tablet TAKE 1 TABLET DAILY 90 tablet 4    calcium-vitamin D (OSCAL-500) 500-200 MG-UNIT per tablet Take 1 tablet by mouth daily LAST DOSE 06/11/2017       No current facility-administered medications for this visit. ALLERGIES:  Allergies as of 04/09/2019    (No Known Allergies)         REVIEW OF SYSTEMS:  Review of Systems   Genitourinary: Positive for vaginal bleeding. All other systems reviewed and are negative. PHYSICAL EXAM:  Physical Exam   Constitutional: She is oriented to person, place, and time. She appears well-developed and well-nourished. No distress. Eyes: Pupils are equal, round, and reactive to light. Neck: Normal range of motion. Neck supple. Cardiovascular: Normal rate, regular rhythm and normal heart sounds. No murmur heard. Pulmonary/Chest: Effort normal and breath sounds normal. No respiratory distress. She has no wheezes. Abdominal: Soft. She exhibits no distension. There is no tenderness. There is no guarding. Musculoskeletal: Normal range of motion. She exhibits no edema or deformity. Neurological: She is alert and oriented to person, place, and time. Skin: Skin is warm. Psychiatric: She has a normal mood and affect. Her behavior is normal.   Nursing note and vitals reviewed. Diagnostics:  Us Pelvis Complete    Result Date: 3/19/2019  EXAMINATION: PELVIC ULTRASOUND 3/19/2019 TECHNIQUE: Transabdominal and transvaginal pelvic ultrasound was performed with color doppler flow evaluation. COMPARISON: 21 November 2018 HISTORY: ORDERING SYSTEM PROVIDED HISTORY: Postmenopausal bleeding TECHNOLOGIST PROVIDED HISTORY: Postmenopausal bleeding Ordering Physician Provided Reason for Exam: post menopausal bleeding Acuity: Acute Type of Exam: Initial FINDINGS: Measurements: Uterus:  6.6 x 4.4 x 3.8 cm. Endometrial stripe:  5 mm. Right Ovary:  2.0 x 1.4 x 1.4 cm. Left Ovary:  2.3 x 2.0 x 2.0 cm. Ultrasound Findings: Uterus: Uterus is anteflexed and retroverted, containing a nabothian cyst. Uterus is heterogeneous consistent with uterine fibroids, with a posterior uterine fibroid of 7.9 x 6.9 x 9.4 mm and an anterior fundal fibroid of 1.1 x 0.9 x 0.8 cm is noted.  Endometrial and bladder) that could require more surgery than expected. The patient is aware that this procedure may not alleviate her symptoms. That there may be a necessity for a second surgery and that there may be an incomplete removal of abnormal tissue.

## 2019-04-09 NOTE — H&P (VIEW-ONLY)
resource strain: Not on file    Food insecurity:     Worry: Not on file     Inability: Not on file    Transportation needs:     Medical: Not on file     Non-medical: Not on file   Tobacco Use    Smoking status: Never Smoker    Smokeless tobacco: Never Used   Substance and Sexual Activity    Alcohol use: No     Alcohol/week: 0.0 oz    Drug use: No    Sexual activity: Never     Birth control/protection: Post-menopausal     Comment: Menarche age 16   Menopause age 49   Lifestyle    Physical activity:     Days per week: Not on file     Minutes per session: Not on file    Stress: Not on file   Relationships    Social connections:     Talks on phone: Not on file     Gets together: Not on file     Attends Tenriism service: Not on file     Active member of club or organization: Not on file     Attends meetings of clubs or organizations: Not on file     Relationship status: Not on file    Intimate partner violence:     Fear of current or ex partner: Not on file     Emotionally abused: Not on file     Physically abused: Not on file     Forced sexual activity: Not on file   Other Topics Concern    Not on file   Social History Narrative    Not on file         MEDICATIONS:  Current Outpatient Medications   Medication Sig Dispense Refill    omeprazole (PRILOSEC) 20 MG delayed release capsule Take once daily. 30 capsule 5    omeprazole (PRILOSEC) 20 MG delayed release capsule Take 1 capsule by mouth daily 30 capsule 3    omeprazole (PRILOSEC) 20 MG delayed release capsule Take 1 capsule by mouth daily 30 capsule 3    doxycycline hyclate (VIBRA-TABS) 100 MG tablet       estradiol (ESTRACE) 0.5 MG tablet TAKE 1 TABLET DAILY 90 tablet 4    medroxyPROGESTERone (PROVERA) 2.5 MG tablet TAKE 1 TABLET DAILY 90 tablet 4    calcium-vitamin D (OSCAL-500) 500-200 MG-UNIT per tablet Take 1 tablet by mouth daily LAST DOSE 06/11/2017       No current facility-administered medications for this visit. ALLERGIES:  Allergies as of 04/09/2019    (No Known Allergies)         REVIEW OF SYSTEMS:  Review of Systems   Genitourinary: Positive for vaginal bleeding. All other systems reviewed and are negative. PHYSICAL EXAM:  Physical Exam   Constitutional: She is oriented to person, place, and time. She appears well-developed and well-nourished. No distress. Eyes: Pupils are equal, round, and reactive to light. Neck: Normal range of motion. Neck supple. Cardiovascular: Normal rate, regular rhythm and normal heart sounds. No murmur heard. Pulmonary/Chest: Effort normal and breath sounds normal. No respiratory distress. She has no wheezes. Abdominal: Soft. She exhibits no distension. There is no tenderness. There is no guarding. Musculoskeletal: Normal range of motion. She exhibits no edema or deformity. Neurological: She is alert and oriented to person, place, and time. Skin: Skin is warm. Psychiatric: She has a normal mood and affect. Her behavior is normal.   Nursing note and vitals reviewed. Diagnostics:  Us Pelvis Complete    Result Date: 3/19/2019  EXAMINATION: PELVIC ULTRASOUND 3/19/2019 TECHNIQUE: Transabdominal and transvaginal pelvic ultrasound was performed with color doppler flow evaluation. COMPARISON: 21 November 2018 HISTORY: ORDERING SYSTEM PROVIDED HISTORY: Postmenopausal bleeding TECHNOLOGIST PROVIDED HISTORY: Postmenopausal bleeding Ordering Physician Provided Reason for Exam: post menopausal bleeding Acuity: Acute Type of Exam: Initial FINDINGS: Measurements: Uterus:  6.6 x 4.4 x 3.8 cm. Endometrial stripe:  5 mm. Right Ovary:  2.0 x 1.4 x 1.4 cm. Left Ovary:  2.3 x 2.0 x 2.0 cm. Ultrasound Findings: Uterus: Uterus is anteflexed and retroverted, containing a nabothian cyst. Uterus is heterogeneous consistent with uterine fibroids, with a posterior uterine fibroid of 7.9 x 6.9 x 9.4 mm and an anterior fundal fibroid of 1.1 x 0.9 x 0.8 cm is noted.  Endometrial stripe: Endometrial stripe normal in width, but there is slight heterogeneity. There is question of an endometrial polyp of 7 x 4 x 7 mm. Right Ovary: Right ovary is within normal limits. There is normal arterial and venous doppler flow. Left Ovary:  Left ovary is within normal limits. There is normal arterial and venous doppler flow. Free Fluid: No evidence of free fluid. Fibroid uterus. Heterogeneity in the endometrium suggestive of a polyp. Retroverted uterus. Small uterine fibroids are demonstrated on current examination. There is little significant interval change in the endometrium compared to prior study. Labs:  Results for orders placed or performed during the hospital encounter of 04/09/19   EKG 12 lead   Result Value Ref Range    Ventricular Rate 67 BPM    Atrial Rate 67 BPM    P-R Interval 170 ms    QRS Duration 86 ms    Q-T Interval 398 ms    QTc Calculation (Bazett) 420 ms    P Axis 70 degrees    R Axis 16 degrees    T Axis 62 degrees           ASSESSMENT:    postmenopausal bleeding with endometrial polyp    Patient Active Problem List    Diagnosis Date Noted    Herniated nucleus pulposus, L4-5 06/23/2017          Plan:  Hysteroscopic polypectomy with D&C    Counseling: The patient was counseled on all options both medical and surgical, conservative as well as definitive. She has elected to proceed with the procedure as stated above. The patient was counseled on the procedure. The patients orders and labs have been completed. The history and physical as well as all supporting surgical documentation will be forwarded to the pre-operative holding area. Consent was explained and signed. All questions were answered.   Specifically, the patient was informed of the possibility of anesthesia complications, the risk of bleeding (including possibility of blood transfusion), the risk of postoperative infection, the risk of damage to adjacent organs (incuding but not limited to the bowel and bladder) that could require more surgery than expected. The patient is aware that this procedure may not alleviate her symptoms. That there may be a necessity for a second surgery and that there may be an incomplete removal of abnormal tissue.

## 2019-04-10 ENCOUNTER — HOSPITAL ENCOUNTER (OUTPATIENT)
Age: 69
Setting detail: OUTPATIENT SURGERY
Discharge: HOME | End: 2019-04-10
Attending: OBSTETRICS & GYNECOLOGY | Admitting: OBSTETRICS & GYNECOLOGY
Payer: COMMERCIAL

## 2019-04-10 ENCOUNTER — ANESTHESIA (OUTPATIENT)
Dept: OPERATING ROOM | Age: 69
End: 2019-04-10
Payer: COMMERCIAL

## 2019-04-10 ENCOUNTER — ANESTHESIA EVENT (OUTPATIENT)
Dept: OPERATING ROOM | Age: 69
End: 2019-04-10
Payer: COMMERCIAL

## 2019-04-10 VITALS
DIASTOLIC BLOOD PRESSURE: 66 MMHG | WEIGHT: 170 LBS | BODY MASS INDEX: 28.32 KG/M2 | OXYGEN SATURATION: 98 % | TEMPERATURE: 97.2 F | SYSTOLIC BLOOD PRESSURE: 150 MMHG | HEART RATE: 63 BPM | HEIGHT: 65 IN | RESPIRATION RATE: 18 BRPM

## 2019-04-10 VITALS
SYSTOLIC BLOOD PRESSURE: 182 MMHG | DIASTOLIC BLOOD PRESSURE: 81 MMHG | OXYGEN SATURATION: 98 % | RESPIRATION RATE: 17 BRPM

## 2019-04-10 PROCEDURE — 3700000001 HC ADD 15 MINUTES (ANESTHESIA): Performed by: OBSTETRICS & GYNECOLOGY

## 2019-04-10 PROCEDURE — 7100000010 HC PHASE II RECOVERY - FIRST 15 MIN: Performed by: OBSTETRICS & GYNECOLOGY

## 2019-04-10 PROCEDURE — 2709999900 HC NON-CHARGEABLE SUPPLY: Performed by: OBSTETRICS & GYNECOLOGY

## 2019-04-10 PROCEDURE — 3600000012 HC SURGERY LEVEL 2 ADDTL 15MIN: Performed by: OBSTETRICS & GYNECOLOGY

## 2019-04-10 PROCEDURE — 7100000000 HC PACU RECOVERY - FIRST 15 MIN: Performed by: OBSTETRICS & GYNECOLOGY

## 2019-04-10 PROCEDURE — 3600000002 HC SURGERY LEVEL 2 BASE: Performed by: OBSTETRICS & GYNECOLOGY

## 2019-04-10 PROCEDURE — 7100000001 HC PACU RECOVERY - ADDTL 15 MIN: Performed by: OBSTETRICS & GYNECOLOGY

## 2019-04-10 PROCEDURE — 3700000000 HC ANESTHESIA ATTENDED CARE: Performed by: OBSTETRICS & GYNECOLOGY

## 2019-04-10 PROCEDURE — 2580000003 HC RX 258: Performed by: NURSE ANESTHETIST, CERTIFIED REGISTERED

## 2019-04-10 PROCEDURE — 6370000000 HC RX 637 (ALT 250 FOR IP): Performed by: OBSTETRICS & GYNECOLOGY

## 2019-04-10 PROCEDURE — 88305 TISSUE EXAM BY PATHOLOGIST: CPT

## 2019-04-10 PROCEDURE — 00952 ANES VAG PX HYSTSC&/HSG: CPT | Performed by: NURSE ANESTHETIST, CERTIFIED REGISTERED

## 2019-04-10 PROCEDURE — 7100000011 HC PHASE II RECOVERY - ADDTL 15 MIN: Performed by: OBSTETRICS & GYNECOLOGY

## 2019-04-10 PROCEDURE — 6360000002 HC RX W HCPCS: Performed by: NURSE ANESTHETIST, CERTIFIED REGISTERED

## 2019-04-10 PROCEDURE — 6360000002 HC RX W HCPCS: Performed by: OBSTETRICS & GYNECOLOGY

## 2019-04-10 PROCEDURE — 2500000003 HC RX 250 WO HCPCS: Performed by: NURSE ANESTHETIST, CERTIFIED REGISTERED

## 2019-04-10 RX ORDER — LIDOCAINE HYDROCHLORIDE 10 MG/ML
INJECTION, SOLUTION EPIDURAL; INFILTRATION; INTRACAUDAL; PERINEURAL PRN
Status: DISCONTINUED | OUTPATIENT
Start: 2019-04-10 | End: 2019-04-10 | Stop reason: SDUPTHER

## 2019-04-10 RX ORDER — SODIUM CHLORIDE 0.9 % (FLUSH) 0.9 %
10 SYRINGE (ML) INJECTION EVERY 12 HOURS SCHEDULED
Status: DISCONTINUED | OUTPATIENT
Start: 2019-04-10 | End: 2019-04-10 | Stop reason: HOSPADM

## 2019-04-10 RX ORDER — PROPOFOL 10 MG/ML
INJECTION, EMULSION INTRAVENOUS PRN
Status: DISCONTINUED | OUTPATIENT
Start: 2019-04-10 | End: 2019-04-10 | Stop reason: SDUPTHER

## 2019-04-10 RX ORDER — SODIUM CHLORIDE 0.9 % (FLUSH) 0.9 %
10 SYRINGE (ML) INJECTION PRN
Status: DISCONTINUED | OUTPATIENT
Start: 2019-04-10 | End: 2019-04-10 | Stop reason: HOSPADM

## 2019-04-10 RX ORDER — FENTANYL CITRATE 50 UG/ML
INJECTION, SOLUTION INTRAMUSCULAR; INTRAVENOUS PRN
Status: DISCONTINUED | OUTPATIENT
Start: 2019-04-10 | End: 2019-04-10 | Stop reason: SDUPTHER

## 2019-04-10 RX ORDER — HYDROCODONE BITARTRATE AND ACETAMINOPHEN 5; 325 MG/1; MG/1
2 TABLET ORAL EVERY 4 HOURS PRN
Status: DISCONTINUED | OUTPATIENT
Start: 2019-04-10 | End: 2019-04-10 | Stop reason: HOSPADM

## 2019-04-10 RX ORDER — ONDANSETRON 2 MG/ML
INJECTION INTRAMUSCULAR; INTRAVENOUS PRN
Status: DISCONTINUED | OUTPATIENT
Start: 2019-04-10 | End: 2019-04-10 | Stop reason: SDUPTHER

## 2019-04-10 RX ORDER — DEXAMETHASONE SODIUM PHOSPHATE 4 MG/ML
INJECTION, SOLUTION INTRA-ARTICULAR; INTRALESIONAL; INTRAMUSCULAR; INTRAVENOUS; SOFT TISSUE PRN
Status: DISCONTINUED | OUTPATIENT
Start: 2019-04-10 | End: 2019-04-10 | Stop reason: SDUPTHER

## 2019-04-10 RX ORDER — SODIUM CHLORIDE, SODIUM LACTATE, POTASSIUM CHLORIDE, CALCIUM CHLORIDE 600; 310; 30; 20 MG/100ML; MG/100ML; MG/100ML; MG/100ML
INJECTION, SOLUTION INTRAVENOUS CONTINUOUS
Status: DISCONTINUED | OUTPATIENT
Start: 2019-04-10 | End: 2019-04-10 | Stop reason: HOSPADM

## 2019-04-10 RX ORDER — ACETAMINOPHEN 325 MG/1
650 TABLET ORAL EVERY 4 HOURS PRN
Status: DISCONTINUED | OUTPATIENT
Start: 2019-04-10 | End: 2019-04-10 | Stop reason: HOSPADM

## 2019-04-10 RX ORDER — HYDROCODONE BITARTRATE AND ACETAMINOPHEN 5; 325 MG/1; MG/1
1 TABLET ORAL EVERY 4 HOURS PRN
Status: DISCONTINUED | OUTPATIENT
Start: 2019-04-10 | End: 2019-04-10 | Stop reason: HOSPADM

## 2019-04-10 RX ORDER — ONDANSETRON 2 MG/ML
4 INJECTION INTRAMUSCULAR; INTRAVENOUS EVERY 8 HOURS PRN
Status: DISCONTINUED | OUTPATIENT
Start: 2019-04-10 | End: 2019-04-10 | Stop reason: HOSPADM

## 2019-04-10 RX ORDER — MIDAZOLAM HYDROCHLORIDE 1 MG/ML
INJECTION INTRAMUSCULAR; INTRAVENOUS PRN
Status: DISCONTINUED | OUTPATIENT
Start: 2019-04-10 | End: 2019-04-10 | Stop reason: SDUPTHER

## 2019-04-10 RX ORDER — MORPHINE SULFATE 4 MG/ML
4 INJECTION, SOLUTION INTRAMUSCULAR; INTRAVENOUS
Status: DISCONTINUED | OUTPATIENT
Start: 2019-04-10 | End: 2019-04-10 | Stop reason: HOSPADM

## 2019-04-10 RX ORDER — MORPHINE SULFATE 2 MG/ML
2 INJECTION, SOLUTION INTRAMUSCULAR; INTRAVENOUS
Status: DISCONTINUED | OUTPATIENT
Start: 2019-04-10 | End: 2019-04-10 | Stop reason: HOSPADM

## 2019-04-10 RX ORDER — SODIUM CHLORIDE, SODIUM LACTATE, POTASSIUM CHLORIDE, CALCIUM CHLORIDE 600; 310; 30; 20 MG/100ML; MG/100ML; MG/100ML; MG/100ML
INJECTION, SOLUTION INTRAVENOUS CONTINUOUS PRN
Status: DISCONTINUED | OUTPATIENT
Start: 2019-04-10 | End: 2019-04-10 | Stop reason: SDUPTHER

## 2019-04-10 RX ORDER — IBUPROFEN 200 MG
800 TABLET ORAL EVERY 8 HOURS PRN
Status: DISCONTINUED | OUTPATIENT
Start: 2019-04-10 | End: 2019-04-10 | Stop reason: HOSPADM

## 2019-04-10 RX ORDER — IBUPROFEN 800 MG/1
800 TABLET ORAL EVERY 8 HOURS PRN
Qty: 15 TABLET | Refills: 1 | Status: SHIPPED | OUTPATIENT
Start: 2019-04-10 | End: 2021-07-07

## 2019-04-10 RX ORDER — PROPOFOL 10 MG/ML
INJECTION, EMULSION INTRAVENOUS CONTINUOUS PRN
Status: DISCONTINUED | OUTPATIENT
Start: 2019-04-10 | End: 2019-04-10 | Stop reason: SDUPTHER

## 2019-04-10 RX ADMIN — LIDOCAINE HYDROCHLORIDE 50 MG: 10 INJECTION, SOLUTION EPIDURAL; INFILTRATION; INTRACAUDAL; PERINEURAL at 11:05

## 2019-04-10 RX ADMIN — FENTANYL CITRATE 25 MCG: 50 INJECTION, SOLUTION INTRAMUSCULAR; INTRAVENOUS at 11:21

## 2019-04-10 RX ADMIN — MIDAZOLAM HYDROCHLORIDE 2 MG: 1 INJECTION, SOLUTION INTRAMUSCULAR; INTRAVENOUS at 11:04

## 2019-04-10 RX ADMIN — IBUPROFEN 800 MG: 200 TABLET, FILM COATED ORAL at 12:15

## 2019-04-10 RX ADMIN — Medication 2 G: at 11:12

## 2019-04-10 RX ADMIN — SODIUM CHLORIDE, POTASSIUM CHLORIDE, SODIUM LACTATE AND CALCIUM CHLORIDE: 600; 310; 30; 20 INJECTION, SOLUTION INTRAVENOUS at 11:03

## 2019-04-10 RX ADMIN — PROPOFOL 200 MG: 10 INJECTION, EMULSION INTRAVENOUS at 11:05

## 2019-04-10 RX ADMIN — ONDANSETRON 4 MG: 2 INJECTION INTRAMUSCULAR; INTRAVENOUS at 11:23

## 2019-04-10 RX ADMIN — DEXAMETHASONE SODIUM PHOSPHATE 4 MG: 4 INJECTION, SOLUTION INTRAMUSCULAR; INTRAVENOUS at 11:08

## 2019-04-10 RX ADMIN — FENTANYL CITRATE 25 MCG: 50 INJECTION, SOLUTION INTRAMUSCULAR; INTRAVENOUS at 11:16

## 2019-04-10 RX ADMIN — PROPOFOL 150 MCG/KG/MIN: 10 INJECTION, EMULSION INTRAVENOUS at 11:08

## 2019-04-10 ASSESSMENT — PULMONARY FUNCTION TESTS
PIF_VALUE: 3
PIF_VALUE: 1
PIF_VALUE: 2
PIF_VALUE: 1
PIF_VALUE: 8
PIF_VALUE: 9
PIF_VALUE: 9
PIF_VALUE: 13
PIF_VALUE: 8
PIF_VALUE: 1
PIF_VALUE: 8
PIF_VALUE: 26
PIF_VALUE: 8
PIF_VALUE: 3
PIF_VALUE: 8
PIF_VALUE: 12
PIF_VALUE: 8

## 2019-04-10 ASSESSMENT — PAIN DESCRIPTION - ONSET
ONSET: GRADUAL
ONSET: ON-GOING

## 2019-04-10 ASSESSMENT — PAIN DESCRIPTION - ORIENTATION
ORIENTATION: LOWER

## 2019-04-10 ASSESSMENT — PAIN DESCRIPTION - PAIN TYPE
TYPE: SURGICAL PAIN
TYPE: CHRONIC PAIN
TYPE: CHRONIC PAIN
TYPE: SURGICAL PAIN

## 2019-04-10 ASSESSMENT — PAIN SCALES - GENERAL
PAINLEVEL_OUTOF10: 2
PAINLEVEL_OUTOF10: 2
PAINLEVEL_OUTOF10: 1
PAINLEVEL_OUTOF10: 0
PAINLEVEL_OUTOF10: 1
PAINLEVEL_OUTOF10: 0
PAINLEVEL_OUTOF10: 2
PAINLEVEL_OUTOF10: 1

## 2019-04-10 ASSESSMENT — PAIN DESCRIPTION - DESCRIPTORS
DESCRIPTORS: CRAMPING

## 2019-04-10 ASSESSMENT — PAIN DESCRIPTION - FREQUENCY
FREQUENCY: CONTINUOUS

## 2019-04-10 ASSESSMENT — PAIN DESCRIPTION - LOCATION
LOCATION: ABDOMEN

## 2019-04-10 ASSESSMENT — PAIN DESCRIPTION - PROGRESSION
CLINICAL_PROGRESSION: GRADUALLY WORSENING
CLINICAL_PROGRESSION: GRADUALLY WORSENING
CLINICAL_PROGRESSION: GRADUALLY IMPROVING
CLINICAL_PROGRESSION: GRADUALLY IMPROVING

## 2019-04-10 ASSESSMENT — PAIN - FUNCTIONAL ASSESSMENT: PAIN_FUNCTIONAL_ASSESSMENT: 0-10

## 2019-04-10 NOTE — DISCHARGE SUMMARY
Surgery Discharge Summary     Patient Identification  Zonia Choe is a 80 y.o. female. :  1950  Admit Date:  4/10/2019    Discharge date:   No discharge date for patient encounter. Disposition: home    Discharge Diagnoses:   Patient Active Problem List   Diagnosis    Herniated nucleus pulposus, L4-5       Condition on discharge: stable    Consults: none    Surgery: Hysteroscopic polypectomy with D&C    Patient Instructions: Activity: no heavy lifting, pushing, pulling for 6 weeks, no driving for 2 weeks or while on analgesics  Diet: As tolerated  Follow-up with gyn in 2 weeks. See pre-printed instructions in chart and given to patient upon discharge. Discharge Medications:      [facility]"   Home Medication Instructions [id number]    Printed on:04/10/19 1131   Medication Information                      calcium-vitamin D (OSCAL-500) 500-200 MG-UNIT per tablet  Take 1 tablet by mouth daily LAST DOSE 2017             doxycycline hyclate (VIBRA-TABS) 100 MG tablet               estradiol (ESTRACE) 0.5 MG tablet  TAKE 1 TABLET DAILY             ibuprofen (ADVIL;MOTRIN) 800 MG tablet  Take 1 tablet by mouth every 8 hours as needed for Pain             medroxyPROGESTERone (PROVERA) 2.5 MG tablet  TAKE 1 TABLET DAILY             omeprazole (PRILOSEC) 20 MG delayed release capsule  Take once daily. HPI and Hospital Course:   80 y.o. female presented on 4/10/2019 for Hysteroscopic polypectomy with D&C . Hospital course was unremarkable. On day of discharge pt was tolerating regular diet, pain controlled with oral medications and ambulating without difficulty.       Electronically signed by Carlos A Manuel MD on 4/10/2019 at 11:31 AM

## 2019-04-10 NOTE — OP NOTE
OPERATIVE NOTE    DATE OF PROCEDURE: 4/10/2019    SURGEON: Dr. Robin Castanedai: none    PREOPERATIVE DIAGNOSIS : endometrial polyp, postmenopausal bleeding    POSTOPERATIVE DIAGNOSIS: Same     OPERATION: Hysteroscopic polypectomy with D&C    ANESTHESIA: general and MAC    ESTIMATED BLOOD LOSS:  Minimal    COMPLICATIONS: None. SPECIMENS:  1    FLUIDS: normal saline        HISTORY: The patient is a 80y.o. year old female with history of above preop diagnosis. The risk, benefits, expected outcome, and alternatives to the procedure were explained to the patient's understanding and written informed consent was obtained. OPERATIVE DETAIL:  The patient was brought to the operating suite, was transferred to the operating table in lithotomy position. Timeout was performed verifying correct patient, position, equipment and procedure to be performed. EPC cuffs were applied. Preoperative antibiotics were infused. Patient was prepped and draped in the usual fashion. The anterior lip of the cervix was grasped with single-tooth tenaculum and the uterus was sounded to 7 cm. Gentle cervical dilatation was done to #8 Hegar dilator. Scope was introduced with saline solution visualizing the internal cavity adequately. Small polyp arising from 7:00 be seen in the lower part of the uterine cavity. Both tubal ostia were seen and the left one is normal S1 shows some adhesion over it. As was introduced through the scope. Cutting the polyp from its pedicle then the hysteroscope was removed and a small curet was introduced cutting the endometrium and sending the specimen for pathology. She tolerated this procedure very well and was taken to recovery room in satisfactory condition.       Electronically signed by Sharyn Noyola MD on 4/10/2019 at 11:32 AM

## 2019-04-10 NOTE — ANESTHESIA PRE PROCEDURE
Department of Anesthesiology  Preprocedure Note       Name:  Rocío Bustamante   Age:  80 y.o.  :  1950                                          MRN:  [id number]         Date:  4/10/2019      Surgeon: Lieutenant Ochoa):  Onofre Cisneros MD    Procedure: D & C HYSTEROSCOPY & polypectomy (N/A )    Medications prior to admission:   Prior to Admission medications    Medication Sig Start Date End Date Taking? Authorizing Provider   estradiol (ESTRACE) 0.5 MG tablet TAKE 1 TABLET DAILY 17  Yes ELANA Garcia CNP   omeprazole (PRILOSEC) 20 MG delayed release capsule Take once daily.  19   Camilla Salcedo MD   doxycycline hyclate (VIBRA-TABS) 100 MG tablet  18   Historical Provider, MD   medroxyPROGESTERone (PROVERA) 2.5 MG tablet TAKE 1 TABLET DAILY 17   ELANA Ponce CNP   calcium-vitamin D (Lowanda Stade) 500-200 MG-UNIT per tablet Take 1 tablet by mouth daily LAST DOSE 2017    Historical Provider, MD       Current medications:    Current Facility-Administered Medications   Medication Dose Route Frequency Provider Last Rate Last Dose    ceFAZolin (ANCEF) 2 g in sterile water 20 mL IV syringe  2 g Intravenous On Call to Jeanette Feldman MD        sodium chloride flush 0.9 % injection 10 mL  10 mL Intravenous 2 times per day Onofre Cisneros MD        sodium chloride flush 0.9 % injection 10 mL  10 mL Intravenous PRN Onofre Cisneros MD        lactated ringers infusion   Intravenous Continuous Carlene Dorado MD           Allergies:  No Known Allergies    Problem List:    Patient Active Problem List   Diagnosis Code    Herniated nucleus pulposus, L4-5 M51.26       Past Medical History:        Diagnosis Date    Cyst of ovary     LEFT    H/O heartburn     TUMs as needed    Hx of blood clots     RT LEG-DURRING VEIN STRIPPING    Lumbar radiculopathy     Rib fracture 2015    fell outside while walking dogs-LEFT    Rosacea 2009    CREAM AS NEEDED    Snores     Wears contact lenses Past Surgical History:        Procedure Laterality Date    BACK SURGERY Left 06/21/2017    microdiscectomy L4-5    DILATION AND CURETTAGE OF UTERUS  1980    LAMINECTOMY Left 6/21/2017    MICRODISCECTOMY L4-5, (PRONE), BRYANT TABLE, [facility], C-ARM, MICROSCOPE performed by John Govea MD at [address]    Removal of birth [place]. -RT AXILLIA    MS COLONOSCOPY FLX DX W/COLLJ SPEC WHEN PFRMD N/A 7/16/2018    COLONOSCOPY performed by Jesse Vogt MD at [address] [place] [address] ENDOSCOPY N/A 7/16/2018    EGD BIOPSY performed by Jesse Vogt MD at [place] [address] History:    Social History     Tobacco Use    Smoking status: Never Smoker    Smokeless tobacco: Never Used   Substance Use Topics    Alcohol use: No     Alcohol/week: 0.0 oz                                Counseling given: Not Answered      Vital Signs (Current):   Vitals:    04/10/19 0915   BP: 135/75   Pulse: 72   Resp: 16   Temp: 36.7 °C (98 °F)   TempSrc: Temporal   SpO2: 96%   Weight: 170 lb (77.1 kg)   Height: 5' 5\" (1.651 m)                                              BP Readings from Last 3 Encounters:   04/10/19 135/75   03/25/19 122/80   03/19/19 122/78       NPO Status: Time of last liquid consumption: 1830                        Time of last solid consumption: 1830                        Date of last liquid consumption: 04/09/19                        Date of last solid food consumption: 04/09/19    BMI:   Wt Readings from Last 3 Encounters:   04/10/19 170 lb (77.1 kg)   03/25/19 170 lb (77.1 kg)   03/19/19 170 lb (77.1 kg)     Body mass index is 28.29 kg/m².     CBC:   Lab Results   Component Value Date    WBC 6.6 04/09/2019    RBC 4.59 04/09/2019    HGB 14.4 04/09/2019    HCT 43.0 04/09/2019    MCV 93.8 04/09/2019    RDW 13.4 04/09/2019     04/09/2019       CMP:   Lab Results   Component Value Date     04/09/2019    K 3.8 04/09/2019    CL during surgery. Common risks include: pain at IV site, throat pain, coughing, hoarseness, high or low BP, and muscle aches. Uncommon risks: injury to the oral cavity, nose, eyes, larynx; headache; infection; aspiration, pneumonia; nerve injury or joint pain; prolonged intubation; awareness of the procedure; stroke; or death.   )  Induction: intravenous. MIPS: Prophylactic antiemetics administered. Anesthetic plan and risks discussed with patient. Use of blood products discussed with patient whom consented to blood products.    Plan discussed with surgical team.                  ELANA Castillo - CRNA   4/10/2019

## 2019-04-12 LAB — SURGICAL PATHOLOGY REPORT: NORMAL

## 2019-04-23 NOTE — INTERVAL H&P NOTE
H&P Update    Patient's History and Physical from April 10, it was reviewed. Patient examined. There has been no change.     Bernadette Lopez

## 2019-04-24 ENCOUNTER — OFFICE VISIT (OUTPATIENT)
Dept: OBGYN | Age: 69
End: 2019-04-24
Payer: COMMERCIAL

## 2019-04-24 VITALS
RESPIRATION RATE: 20 BRPM | BODY MASS INDEX: 28.32 KG/M2 | WEIGHT: 170 LBS | HEIGHT: 65 IN | HEART RATE: 70 BPM | SYSTOLIC BLOOD PRESSURE: 138 MMHG | DIASTOLIC BLOOD PRESSURE: 88 MMHG

## 2019-04-24 DIAGNOSIS — N84.0 ENDOMETRIAL POLYP: ICD-10-CM

## 2019-04-24 DIAGNOSIS — D25.1 INTRAMURAL LEIOMYOMA OF UTERUS: ICD-10-CM

## 2019-04-24 DIAGNOSIS — N95.1 MENOPAUSAL AND FEMALE CLIMACTERIC STATES: ICD-10-CM

## 2019-04-24 DIAGNOSIS — N95.0 POSTMENOPAUSAL BLEEDING: Primary | ICD-10-CM

## 2019-04-24 DIAGNOSIS — Z09 POSTOP CHECK: ICD-10-CM

## 2019-04-24 PROCEDURE — 99213 OFFICE O/P EST LOW 20 MIN: CPT | Performed by: OBSTETRICS & GYNECOLOGY

## 2019-04-24 ASSESSMENT — PATIENT HEALTH QUESTIONNAIRE - PHQ9
2. FEELING DOWN, DEPRESSED OR HOPELESS: 0
1. LITTLE INTEREST OR PLEASURE IN DOING THINGS: 0
SUM OF ALL RESPONSES TO PHQ QUESTIONS 1-9: 0
SUM OF ALL RESPONSES TO PHQ9 QUESTIONS 1 & 2: 0
SUM OF ALL RESPONSES TO PHQ QUESTIONS 1-9: 0

## 2019-04-24 NOTE — PROGRESS NOTES
Subjective:      Patient ID: Jaya Tong  is a 80 y.o. y.o. Female. Postop check 2 weeks after hysteroscopy polypectomy doing well without any complains her pathology shows benign endometrial polyp with inactive endometrium. Been using estrogen and progesterone for some time and then recently only estrogen unopposed. HPI  Chief Complaint   Patient presents with    Follow-up     post D&C      Family History   Problem Relation Age of Onset    Uterine Cancer Mother     Obesity Mother     Hypertension Mother     Diabetes Mother         NIDDM    Breast Cancer Maternal Aunt     Other Brother 32         IN TRAIN VERSUS TRUCK ACCIDENT     Past Medical History:   Diagnosis Date    Cyst of ovary     LEFT    H/O heartburn     TUMs as needed    Hx of blood clots 2012    RT LEG-DURRING VEIN STRIPPING    Lumbar radiculopathy     Rib fracture 2015    fell outside while walking dogs-LEFT    Rosacea 2009    CREAM AS NEEDED    Snores     Wears contact lenses      Past Surgical History:   Procedure Laterality Date    BACK SURGERY Left 2017    microdiscectomy L4-5    DILATION AND CURETTAGE OF UTERUS      DILATION AND CURETTAGE OF UTERUS N/A 4/10/2019    D & C HYSTEROSCOPY & polypectomy performed by Isabel Borges MD at [address] Left 2017    MICRODISCECTOMY L4-5, (PRONE), BRYANT TABLE, [facility], C-ARM, MICROSCOPE performed by Roopa Butcher MD at [address] [facility] Right 1981    Removal of birth [place]. -RT AXILLIA    MI COLONOSCOPY FLX DX W/COLLJ SPEC WHEN PFRMD N/A 2018    COLONOSCOPY performed by Lisa Vale MD at [address] [facility] N/A 2018    EGD BIOPSY performed by Lisa Vale MD at [address] [facility]      reports that she has never smoked. She has never used smokeless tobacco. She reports that she does not drink alcohol or use drugs.   No Known Allergies    Current Outpatient Medications:     Multiple Vitamins-Minerals (MULTI-VITAMIN MENOPAUSAL) TABS, Take 1 tablet by mouth once for 1 dose, Disp: 100 tablet, Rfl: 2    omeprazole (PRILOSEC) 20 MG delayed release capsule, Take once daily. , Disp: 30 capsule, Rfl: 5    doxycycline hyclate (VIBRA-TABS) 100 MG tablet, , Disp: , Rfl:     calcium-vitamin D (OSCAL-500) 500-200 MG-UNIT per tablet, Take 1 tablet by mouth daily LAST DOSE 06/11/2017, Disp: , Rfl:     ibuprofen (ADVIL;MOTRIN) 800 MG tablet, Take 1 tablet by mouth every 8 hours as needed for Pain, Disp: 15 tablet, Rfl: 1    estradiol (ESTRACE) 0.5 MG tablet, TAKE 1 TABLET DAILY, Disp: 90 tablet, Rfl: 4    medroxyPROGESTERone (PROVERA) 2.5 MG tablet, TAKE 1 TABLET DAILY, Disp: 90 tablet, Rfl: 4      Review of Systems   All other systems reviewed and are negative. Breast ROS: negative    Objective:   /88 (Site: Right Upper Arm, Position: Sitting, Cuff Size: Medium Adult)   Pulse 70   Resp 20   Ht 5' 5\" (1.651 m)   Wt 170 lb (77.1 kg)   LMP 06/20/2007 (Approximate)   BMI 28.29 kg/m²     Physical Exam   Constitutional: She is oriented to person, place, and time. She appears well-developed and well-nourished. No distress. Eyes: Pupils are equal, round, and reactive to light. Neck: Normal range of motion. Neck supple. Pulmonary/Chest: Effort normal. No respiratory distress. She has no wheezes. Musculoskeletal: Normal range of motion. She exhibits no edema or deformity. Neurological: She is alert and oriented to person, place, and time. Skin: Skin is warm. Psychiatric: She has a normal mood and affect. Her behavior is normal.   Nursing note and vitals reviewed. Breast exam: WNL, No skin retraction, dimpling or skin discoloration. No nippledischarge. Any bleeding or pain withintercourse: No      Do you do self breast exams: Yes    Assessment:      Diagnosis Orders   1. Postmenopausal bleeding  US PELVIS COMPLETE   2. Endometrial polyp     3.  Postop check  US PELVIS COMPLETE   4. Intramural leiomyoma of uterus     5. Menopausal and female climacteric states             Plan:     Orders Placed This Encounter   Procedures    US PELVIS COMPLETE     Standing Status:   Future     Standing Expiration Date:   4/24/2020   Reassured and recommended follow-up in 3 months with ultrasound. Vastus.   Hormone replacement therapy given menopausal vitamins and asked to use over-the-counter herbal medication          Matheus Mcdonald MD

## 2019-05-02 ENCOUNTER — TELEPHONE (OUTPATIENT)
Dept: OBGYN | Age: 69
End: 2019-05-02

## 2019-05-02 NOTE — TELEPHONE ENCOUNTER
MVI is not covered by [facility] either use an alternate med or try [facility] in [place]   [phone], ok to leave VM at this number

## 2019-05-18 ENCOUNTER — TELEPHONE (OUTPATIENT)
Dept: MAMMOGRAPHY | Age: 69
End: 2019-05-18

## 2019-05-18 DIAGNOSIS — Z12.31 SCREENING MAMMOGRAM, ENCOUNTER FOR: Primary | ICD-10-CM

## 2019-05-21 ENCOUNTER — OFFICE VISIT (OUTPATIENT)
Dept: OBGYN | Age: 69
End: 2019-05-21
Payer: COMMERCIAL

## 2019-05-21 ENCOUNTER — HOSPITAL ENCOUNTER (OUTPATIENT)
Age: 69
Setting detail: SPECIMEN
Discharge: HOME | End: 2019-05-21
Payer: COMMERCIAL

## 2019-05-21 ENCOUNTER — HOSPITAL ENCOUNTER (OUTPATIENT)
Dept: MAMMOGRAPHY | Age: 69
Discharge: HOME | End: 2019-05-23
Payer: COMMERCIAL

## 2019-05-21 VITALS
DIASTOLIC BLOOD PRESSURE: 80 MMHG | BODY MASS INDEX: 28.82 KG/M2 | WEIGHT: 173 LBS | SYSTOLIC BLOOD PRESSURE: 120 MMHG | HEART RATE: 68 BPM | HEIGHT: 65 IN

## 2019-05-21 DIAGNOSIS — R23.2 HOT FLASHES: ICD-10-CM

## 2019-05-21 DIAGNOSIS — Z01.419 WELL FEMALE EXAM WITH ROUTINE GYNECOLOGICAL EXAM: Primary | ICD-10-CM

## 2019-05-21 DIAGNOSIS — Z12.31 SCREENING MAMMOGRAM, ENCOUNTER FOR: ICD-10-CM

## 2019-05-21 DIAGNOSIS — Z12.31 VISIT FOR SCREENING MAMMOGRAM: ICD-10-CM

## 2019-05-21 DIAGNOSIS — Z12.4 SCREENING FOR CERVICAL CANCER: ICD-10-CM

## 2019-05-21 PROCEDURE — G0145 SCR C/V CYTO,THINLAYER,RESCR: HCPCS

## 2019-05-21 PROCEDURE — 99397 PER PM REEVAL EST PAT 65+ YR: CPT | Performed by: NURSE PRACTITIONER

## 2019-05-21 PROCEDURE — 77067 SCR MAMMO BI INCL CAD: CPT

## 2019-05-21 NOTE — PROGRESS NOTES
Gabriela Case  2019              80 y.o. Chief Complaint   Patient presents with    Gynecologic Exam     last pap 18         Patient's last menstrual period was 2007 (approximate). No referring provider defined for this encounter. HPI :Annual Exam  Patient presents for annual exam.  Counseling on healthy lifestyle reviewed, as well as the need for self breast exam. We reviewed the need for Kegal exercises. We discussed and reviewed the need for routine screenings and immunization updates when appropriate. Having some hot flashes since stopping estrogen. Worse in the evening. Discussed various options. Will call if they become disruptive. Performs monthly self breast exams. Has good bladder control. Recently had hysteroscopy/ D&C with negative pathology report. The patient is not sexually active. last pap: was normal, last mammogram: was normal  The patient has regular exercise: yes .  We did review the need for and frequency of both weight bearing and strengthening as tolerated by the patient. ________________________________________________________________________  OB History    Para Term  AB Living   3 3 2 0 0 2   SAB TAB Ectopic Molar Multiple Live Births   0 0 0 0 0 0      # Outcome Date GA Lbr Caesar/2nd Weight Sex Delivery Anes PTL Lv   3 Para     F       2 Term     F Vag-Spont      1 Term     M Vag-Spont        Past Medical History:   Diagnosis Date    Cyst of ovary     LEFT    H/O heartburn     TUMs as needed    Hx of blood clots     RT LEG-DURRING VEIN STRIPPING    Lumbar radiculopathy     Rib fracture 2015    fell outside while walking dogs-LEFT    Rosacea 2009    CREAM AS NEEDED    Snores     Wears contact lenses                                                                    Past Surgical History:   Procedure Laterality Date    BACK SURGERY Left 2017    microdiscectomy L4-5    DILATION AND CURETTAGE OF UTERUS [address] N/A 4/10/2019    D & C HYSTEROSCOPY & polypectomy performed by Jocy Vincent MD at [address] Left 2017    MICRODISCECTOMY L4-5, (PRONE), BRYANT MCPHERSON, Rosa Williamson, C-ARM, MICROSCOPE performed by Jay Borges MD at [address] [facility] Right 1981    Removal of birth [place]. -RT AXILLIA    [place] COLONOSCOPY FLX DX W/COLLJ SPEC WHEN PFRMD N/A 2018    COLONOSCOPY performed by Lizeth Galvan MD at [address] [facility] ENDOSCOPY N/A 2018    EGD BIOPSY performed by Lizeth Galvan MD at [facility] OR     Family History   Problem Relation Age of Onset    Uterine Cancer Mother     Obesity Mother     Hypertension Mother     Diabetes Mother         NIDDM    Breast Cancer Maternal Aunt     Other Brother 32         IN TRAIN VERSUS TRUCK ACCIDENT     Social History     Socioeconomic History    Marital status:       Spouse name: Not on file    Number of children: Not on file    Years of education: Not on file    Highest education level: Not on file   Occupational History    Not on file   Social Needs    Financial resource strain: Not on file    Food insecurity:     Worry: Not on file     Inability: Not on file    Transportation needs:     Medical: Not on file     Non-medical: Not on file   Tobacco Use    Smoking status: Never Smoker    Smokeless tobacco: Never Used   Substance and Sexual Activity    Alcohol use: No     Alcohol/week: 0.0 oz    Drug use: No    Sexual activity: Never     Birth control/protection: Post-menopausal     Comment: Menarche age 16   Menopause age 49   Lifestyle    Physical activity:     Days per week: Not on file     Minutes per session: Not on file    Stress: Not on file   Relationships    Social connections:     Talks on phone: Not on file     Gets together: Not on file     Attends [employer] service: Not on file     Active member of club or organization: Not on file     Attends meetings of clubs or organizations: Not on file     Relationship status: Not on file    Intimate partner violence:     Fear of current or ex partner: Not on file     Emotionally abused: Not on file     Physically abused: Not on file     Forced sexual activity: Not on file   Other Topics Concern    Not on file   Social History Narrative    Not on file       MEDICATIONS:  Current Outpatient Medications   Medication Sig Dispense Refill    omeprazole (PRILOSEC) 20 MG delayed release capsule Take once daily. 30 capsule 5    doxycycline hyclate (VIBRA-TABS) 100 MG tablet       calcium-vitamin D (OSCAL-500) 500-200 MG-UNIT per tablet Take 1 tablet by mouth daily LAST DOSE 06/11/2017      Multiple Vitamins-Minerals (MULTI-VITAMIN MENOPAUSAL) TABS Take 1 tablet by mouth once for 1 dose 100 tablet 2    ibuprofen (ADVIL;MOTRIN) 800 MG tablet Take 1 tablet by mouth every 8 hours as needed for Pain 15 tablet 1    estradiol (ESTRACE) 0.5 MG tablet TAKE 1 TABLET DAILY 90 tablet 4    medroxyPROGESTERone (PROVERA) 2.5 MG tablet TAKE 1 TABLET DAILY 90 tablet 4     No current facility-administered medications for this visit. ALLERGIES:  Allergies as of 05/21/2019    (No Known Allergies)           Gynecologic History:  Menarche: 14   Menopause at 49      Patient's last menstrual period was 06/20/2007 (approximate). Hormone Exposure: No    Family History of Breast, Ovarian , Colon or Uterine Cancer: Yes Mat. Aunt with breast cancer     Preventative Health Testing:  Date of Last Pap Smear: 2018  Date of Last Mammogram: 2018  Date of Last Colonoscopy: 2018  Date of Last Bone Density: 2016  ________________________________________________________________________  REVIEW OF SYSTEMS:     A minimum of an eleven point review of systems was completed.     Review Of Systems (11 point):  General ROS:  positive for hot flashes  Hematological and Lymphatic ROS:negative   Breast ROS: negative  Cardiovascular ROS: negative  Respiratory ROS: negative   Gastrointestinal ROS: negative  Genito-Urinary ROS: negative  Psychological ROS: negative  Neurological ROS: negative  Musculoskeletal ROS: negative  Dermatological ROS: negative                                                                                                                                                                                   PHYSICAL Exam:     Constitutional:  Blood pressure 120/80, pulse 68, height 5' 5\" (1.651 m), weight 173 lb (78.5 kg), last menstrual period 06/20/2007, not currently breastfeeding. General Appearance: This  is a well Developed, well Nourished, well groomed female. Her BMI was reviewed. Skin:  There was a Normal Inspection of the skin without rashes or lesions. There were no rashes. (Papular, Maculopapular, Hives, Pustular, Macular)     There were no lesions (Ulcers, Erythema, Abn. Appearing Nevi)      Lymphatic:  No Lymph Nodes were Palpable in the neck , axilla or groin. Neck and EENT:  The neck was supple. There were no masses   The thyroid was not enlarged and had no masses. PERRLA, Nares Patent No Masses    Respiratory: The lungs were auscultated and found to be clear. There were no rales, rhonchi or wheezes. There was a good respiratory effort. Cardiovascular: The heart was in a regular rate and rhythm. . No S3 or S4. There was no murmur appreciated. Extremities: The patients extremities were without calf tenderness, edema, or varicosities. There was full range of motion in all four extremities. Pulses in all four extremities    Abdomen: The abdomen was soft and non-tender. There were good bowel sounds in all quadrants and there was no guarding, rebound or rigidity. On evaluation there was no evidence of hepatosplenomegaly and there was no costal vertebral rani tenderness bilaterally. No hernias were appreciated. Psych:   The patient had a normal Orientation to: Time, Place, Person, and Situation  Mood and affect appropriate    Breast:  (Chest)  normal appearance, no masses or tenderness, Inspection negative, No nipple retraction or dimpling, No nipple discharge or bleeding, No axillary or supraclavicular adenopathy, Normal to palpation without dominant masses, negative findings: normal in size and symmetry, normal contour with no evidence of flattening or dimpling, skin normal, nipples everted without rashes or discharge, palpation negative for masses or nodules, no palpable axillary lymphadenopathy      Pelvic Exam:  External genitalia: normal general appearance  Urinary system: urethral meatus normal  Vaginal: normal mucosa without prolapse or lesions, normal without tenderness, induration or masses and normal rugae  Cervix: normal appearance and thin prep PAP obtained  Adnexa: normal bimanual exam and non palpable  Uterus: normal single, nontender    Musculosk:  Normal Gait and station was noted. Digits were evaluated without abnormal findings. Range of motion, stability and strength were evaluated and found to be appropriate for the patients age. ASSESSMENT:      80 y.o. Annual   Diagnosis Orders   1. Well female exam with routine gynecological exam     2. Hot flashes     3. Screening for cervical cancer  PAP SMEAR   4. Visit for screening mammogram  HENRY DIGITAL SCREEN W CAD BILATERAL        Chief Complaint   Patient presents with    Gynecologic Exam     last pap 5/17/18         Past Medical History:   Diagnosis Date    Cyst of ovary 2008    LEFT    H/O heartburn     TUMs as needed    Hx of blood clots 2012    RT LEG-DURRING VEIN STRIPPING    Lumbar radiculopathy     Rib fracture 03/02/2015    fell outside while walking dogs-LEFT    Rosacea 2009    CREAM AS NEEDED    Snores     Wears contact lenses          Patient Active Problem List   Diagnosis    Herniated nucleus pulposus, L4-5          Hereditary Breast, Ovarian, Colon and Uterine Cancer screening Done.           Tobacco & Secondary smoke risks reviewed; instructed on cessation and avoidance    PLAN:  Return in about 1 year (around 5/21/2020) for Annual Exam.  Return for annual exams  Mammograms every 1 year. If 38 yo and last mammogram was negative. Routine health maintenance per patients PCP. Orders Placed This Encounter   Procedures    HENRY DIGITAL SCREEN W CAD BILATERAL     Standing Status:   Future     Standing Expiration Date:   11/21/2020     Scheduling Instructions: On the day of the exam, the patient should not wear deodorant, lotion, or powder on the breast or underarm area. Wear a two piece outfit and be prepared to give information about prior breast procedures including mammograms, biopsies, or surgeries. If you've had mammograms done elsewhere, please request any previous mammogram films from those organizations prior to your appointment. Order Specific Question:   Reason for exam:     Answer:   SCREENING MAMMOGRAM    PAP SMEAR     Standing Status:   Future     Number of Occurrences:   1     Standing Expiration Date:   7/21/2019     Order Specific Question:   Collection Type     Answer: Thin Prep     Order Specific Question:   Prior Abnormal Pap Test     Answer:   No     Order Specific Question:   Screening or Diagnostic     Answer:   Screening     Order Specific Question:   HPV Requested?      Answer:   Yes -  If ASCUS Reflex HPV     Order Specific Question:   High Risk Patient     Answer:   N/A       Des Espinosa  5/21/2019

## 2019-05-24 LAB — CYTOLOGY REPORT: NORMAL

## 2019-07-24 ENCOUNTER — HOSPITAL ENCOUNTER (OUTPATIENT)
Dept: ULTRASOUND IMAGING | Age: 69
Discharge: HOME | End: 2019-07-26
Payer: COMMERCIAL

## 2019-07-24 DIAGNOSIS — N95.0 POSTMENOPAUSAL BLEEDING: ICD-10-CM

## 2019-07-24 DIAGNOSIS — Z09 POSTOP CHECK: ICD-10-CM

## 2019-07-24 PROCEDURE — 76830 TRANSVAGINAL US NON-OB: CPT

## 2019-07-24 PROCEDURE — 76856 US EXAM PELVIC COMPLETE: CPT

## 2019-08-09 DIAGNOSIS — K29.50 CHRONIC GASTRITIS WITHOUT BLEEDING, UNSPECIFIED GASTRITIS TYPE: ICD-10-CM

## 2019-08-13 RX ORDER — OMEPRAZOLE 20 MG/1
CAPSULE, DELAYED RELEASE ORAL
Qty: 90 CAPSULE | Refills: 1 | Status: SHIPPED | OUTPATIENT
Start: 2019-08-13 | End: 2020-02-26

## 2019-10-11 LAB
ALBUMIN SERPL-MCNC: 4.5 G/DL
ALP BLD-CCNC: 99 U/L
ALT SERPL-CCNC: 18 U/L
ANION GAP SERPL CALCULATED.3IONS-SCNC: 2 MMOL/L
AST SERPL-CCNC: 22 U/L
BILIRUB SERPL-MCNC: 0.5 MG/DL (ref 0.1–1.4)
BUN BLDV-MCNC: 14 MG/DL
CALCIUM SERPL-MCNC: 9.9 MG/DL
CHLORIDE BLD-SCNC: 105 MMOL/L
CHOLESTEROL, TOTAL: 163 MG/DL
CHOLESTEROL/HDL RATIO: 3.1
CO2: NORMAL MMOL/L
CREAT SERPL-MCNC: 0.86 MG/DL
GFR CALCULATED: 70
GLUCOSE BLD-MCNC: 90 MG/DL
GLUCOSE FASTING: 90 MG/DL
HDLC SERPL-MCNC: 52 MG/DL (ref 35–70)
LDL CHOLESTEROL CALCULATED: 89 MG/DL (ref 0–160)
POTASSIUM SERPL-SCNC: 4.6 MMOL/L
SODIUM BLD-SCNC: 144 MMOL/L
TOTAL PROTEIN: 6.7
TRIGL SERPL-MCNC: 111 MG/DL
TSH SERPL DL<=0.05 MIU/L-ACNC: 1.95 UIU/ML
URIC ACID: 5.2
VLDLC SERPL CALC-MCNC: 22 MG/DL

## 2019-10-21 ENCOUNTER — TELEPHONE (OUTPATIENT)
Dept: OBGYN | Age: 69
End: 2019-10-21

## 2020-02-25 ENCOUNTER — TELEPHONE (OUTPATIENT)
Dept: OBGYN | Age: 70
End: 2020-02-25

## 2020-02-26 RX ORDER — OMEPRAZOLE 20 MG/1
CAPSULE, DELAYED RELEASE ORAL
Qty: 90 CAPSULE | Refills: 0 | Status: SHIPPED | OUTPATIENT
Start: 2020-02-26 | End: 2020-05-26 | Stop reason: SDUPTHER

## 2020-05-21 ENCOUNTER — TELEPHONE (OUTPATIENT)
Dept: SURGERY | Age: 70
End: 2020-05-21

## 2020-05-21 RX ORDER — OMEPRAZOLE 20 MG/1
20 CAPSULE, DELAYED RELEASE ORAL DAILY
Qty: 30 CAPSULE | Refills: 5 | Status: SHIPPED | OUTPATIENT
Start: 2020-05-21 | End: 2020-11-24

## 2020-05-26 ENCOUNTER — HOSPITAL ENCOUNTER (OUTPATIENT)
Dept: LAB | Age: 70
Discharge: HOME | End: 2020-05-26
Payer: MEDICARE

## 2020-05-26 ENCOUNTER — HOSPITAL ENCOUNTER (OUTPATIENT)
Dept: MAMMOGRAPHY | Age: 70
Discharge: HOME | End: 2020-05-28
Payer: MEDICARE

## 2020-05-26 ENCOUNTER — HOSPITAL ENCOUNTER (OUTPATIENT)
Age: 70
Setting detail: SPECIMEN
Discharge: HOME | End: 2020-05-26
Payer: MEDICARE

## 2020-05-26 ENCOUNTER — OFFICE VISIT (OUTPATIENT)
Dept: OBGYN | Age: 70
End: 2020-05-26
Payer: MEDICARE

## 2020-05-26 ENCOUNTER — HOSPITAL ENCOUNTER (OUTPATIENT)
Dept: NON INVASIVE DIAGNOSTICS | Age: 70
Discharge: HOME | End: 2020-05-26
Payer: MEDICARE

## 2020-05-26 VITALS
HEART RATE: 64 BPM | DIASTOLIC BLOOD PRESSURE: 70 MMHG | BODY MASS INDEX: 27.96 KG/M2 | TEMPERATURE: 97.6 F | HEIGHT: 65 IN | SYSTOLIC BLOOD PRESSURE: 130 MMHG | WEIGHT: 167.8 LBS

## 2020-05-26 LAB
ABSOLUTE EOS #: 0.04 K/UL (ref 0–0.44)
ABSOLUTE IMMATURE GRANULOCYTE: <0.03 K/UL (ref 0–0.3)
ABSOLUTE LYMPH #: 2.18 K/UL (ref 1.1–3.7)
ABSOLUTE MONO #: 0.69 K/UL (ref 0.1–1.2)
ANION GAP SERPL CALCULATED.3IONS-SCNC: 11 MMOL/L (ref 9–17)
BASOPHILS # BLD: 1 % (ref 0–2)
BASOPHILS ABSOLUTE: 0.06 K/UL (ref 0–0.2)
BUN BLDV-MCNC: 14 MG/DL (ref 8–23)
BUN/CREAT BLD: 22 (ref 9–20)
CALCIUM SERPL-MCNC: 9.6 MG/DL (ref 8.6–10.4)
CHLORIDE BLD-SCNC: 105 MMOL/L (ref 98–107)
CO2: 26 MMOL/L (ref 20–31)
CREAT SERPL-MCNC: 0.64 MG/DL (ref 0.5–0.9)
DIFFERENTIAL TYPE: ABNORMAL
EOSINOPHILS RELATIVE PERCENT: 1 % (ref 1–4)
GFR AFRICAN AMERICAN: >60 ML/MIN
GFR NON-AFRICAN AMERICAN: >60 ML/MIN
GFR SERPL CREATININE-BSD FRML MDRD: ABNORMAL ML/MIN/{1.73_M2}
GFR SERPL CREATININE-BSD FRML MDRD: ABNORMAL ML/MIN/{1.73_M2}
GLUCOSE BLD-MCNC: 92 MG/DL (ref 70–99)
HCT VFR BLD CALC: 41.9 % (ref 36.3–47.1)
HEMOGLOBIN: 14.2 G/DL (ref 11.9–15.1)
IMMATURE GRANULOCYTES: 0 %
INR BLD: 1
LYMPHOCYTES # BLD: 35 % (ref 24–43)
MCH RBC QN AUTO: 31.5 PG (ref 25.2–33.5)
MCHC RBC AUTO-ENTMCNC: 33.9 G/DL (ref 25.2–33.5)
MCV RBC AUTO: 92.9 FL (ref 82.6–102.9)
MONOCYTES # BLD: 11 % (ref 3–12)
NRBC AUTOMATED: 0 PER 100 WBC
PARTIAL THROMBOPLASTIN TIME: 26.6 SEC (ref 23.9–33.8)
PDW BLD-RTO: 12.8 % (ref 11.8–14.4)
PLATELET # BLD: 227 K/UL (ref 138–453)
PLATELET ESTIMATE: ABNORMAL
PMV BLD AUTO: 11.6 FL (ref 8.1–13.5)
POTASSIUM SERPL-SCNC: 4.1 MMOL/L (ref 3.7–5.3)
PROTHROMBIN TIME: 12.4 SEC (ref 11.5–14.2)
RBC # BLD: 4.51 M/UL (ref 3.95–5.11)
RBC # BLD: ABNORMAL 10*6/UL
SEG NEUTROPHILS: 52 % (ref 36–65)
SEGMENTED NEUTROPHILS ABSOLUTE COUNT: 3.28 K/UL (ref 1.5–8.1)
SODIUM BLD-SCNC: 142 MMOL/L (ref 135–144)
WBC # BLD: 6.3 K/UL (ref 3.5–11.3)
WBC # BLD: ABNORMAL 10*3/UL

## 2020-05-26 PROCEDURE — 85610 PROTHROMBIN TIME: CPT

## 2020-05-26 PROCEDURE — G0101 CA SCREEN;PELVIC/BREAST EXAM: HCPCS | Performed by: NURSE PRACTITIONER

## 2020-05-26 PROCEDURE — 85730 THROMBOPLASTIN TIME PARTIAL: CPT

## 2020-05-26 PROCEDURE — 99214 OFFICE O/P EST MOD 30 MIN: CPT

## 2020-05-26 PROCEDURE — G0145 SCR C/V CYTO,THINLAYER,RESCR: HCPCS

## 2020-05-26 PROCEDURE — 99397 PER PM REEVAL EST PAT 65+ YR: CPT

## 2020-05-26 PROCEDURE — 36415 COLL VENOUS BLD VENIPUNCTURE: CPT

## 2020-05-26 PROCEDURE — 80048 BASIC METABOLIC PNL TOTAL CA: CPT

## 2020-05-26 PROCEDURE — 77063 BREAST TOMOSYNTHESIS BI: CPT

## 2020-05-26 PROCEDURE — 85025 COMPLETE CBC W/AUTO DIFF WBC: CPT

## 2020-05-26 PROCEDURE — 93005 ELECTROCARDIOGRAM TRACING: CPT | Performed by: SURGERY

## 2020-05-26 PROCEDURE — 99214 OFFICE O/P EST MOD 30 MIN: CPT | Performed by: NURSE PRACTITIONER

## 2020-05-26 NOTE — PROGRESS NOTES
Nikos Rolon  2020              77 y.o. Chief Complaint   Patient presents with    Gynecologic Exam     yearly         Patient's last menstrual period was 2007 (approximate). No referring provider defined for this encounter. HPI :Annual Exam  Patient presents for annual exam.  Counseling on healthy lifestyle reviewed, as well as the need for self breast exam. We reviewed the need for Kegal exercises. We discussed and reviewed the need for routine screenings and immunization updates when appropriate. Stress fracture 2nd metatarsal left foot. Last DEXA  and normal, but will update. Discussed bone health. Reviewed adequate calcium and vitamin D requirements with emphasis on dietary calcium as opposed to large quantities of supplements. Discussed osteoporosis prevention, fall prevention, fracture risks, weight bearing exercise and upper body strengthening. Denies vaginal bleeding or spotting and is aware of the need to report any she would note in the future. Performs monthly self breast exams. Good bladder control. Performs monthly self breast exams. The patient is not sexually active. last pap: was normal, last mammogram: was normal  The patient has regular exercise: yes .  We did review the need for and frequency of both weight bearing and strengthening as tolerated by the patient. ________________________________________________________________________  OB History    Para Term  AB Living   3 3 2 0 0 2   SAB TAB Ectopic Molar Multiple Live Births   0 0 0 0 0 0      # Outcome Date GA Lbr Caesar/2nd Weight Sex Delivery Anes PTL Lv   3 Para     F       2 Term     F Vag-Spont      1 Term     M Vag-Spont        Past Medical History:   Diagnosis Date    Cyst of ovary     LEFT    H/O heartburn     TUMs as needed    Hx of blood clots     RT LEG-DURRING VEIN STRIPPING    Lumbar radiculopathy     Rib fracture 2015    fell outside while walking were good bowel sounds in all quadrants and there was no guarding, rebound or rigidity. On evaluation there was no evidence of hepatosplenomegaly and there was no costal vertebral rani tenderness bilaterally. No hernias were appreciated. Psych: The patient had a normal Orientation to: Time, Place, Person, and Situation  Mood and affect appropriate    Breast:  (Chest)  normal appearance, no masses or tenderness, Inspection negative, No nipple retraction or dimpling, No nipple discharge or bleeding, No axillary or supraclavicular adenopathy, Normal to palpation without dominant masses      Pelvic Exam:  External genitalia: normal general appearance  Urinary system: urethral meatus normal  Vaginal: atrophic mucosa  Cervix: normal appearance  Adnexa: normal bimanual exam and non palpable  Uterus: normal single, nontender    Musculosk:  Normal Gait and station was noted. Digits were evaluated without abnormal findings. Range of motion, stability and strength were evaluated and found to be appropriate for the patients age. ASSESSMENT:      77 y.o. Annual   Diagnosis Orders   1. Menopause  DEXA AXIAL SKELETON W VERTEBRAL FX ASST    HENRY DIGITAL SCREEN W CAD BILATERAL   2. Disorder of bone, unspecified   Vitamin D 25 Hydroxy   3. Stress fracture of metatarsal bone of left foot with routine healing, subsequent encounter  Vitamin D 25 Hydroxy   4. Well female exam with routine gynecological exam  IN CA SCREEN;PELVIC/BREAST EXAM    IN OBTAINING SCREEN PAP SMEAR   5.  Screening for cervical cancer  PAP SMEAR        Chief Complaint   Patient presents with    Gynecologic Exam     yearly         Past Medical History:   Diagnosis Date    Cyst of ovary 2008    LEFT    H/O heartburn     TUMs as needed    Hx of blood clots 2012    RT LEG-DURRING VEIN STRIPPING    Lumbar radiculopathy     Rib fracture 03/02/2015    fell outside while walking dogs-LEFT    Rosacea 2009    CREAM AS NEEDED    Snores     Stress fracture Order Specific Question:   High Risk Patient     Answer:   N/A    HI CA SCREEN;PELVIC/BREAST EXAM    HI OBTAINING SCREEN PAP SMEAR     25 minutes spent face to face, 80% in counseling, education, and coordination of care.     Pretty Espinosa  5/26/2020

## 2020-05-27 LAB
EKG ATRIAL RATE: 67 BPM
EKG P AXIS: 57 DEGREES
EKG P-R INTERVAL: 154 MS
EKG Q-T INTERVAL: 418 MS
EKG QRS DURATION: 136 MS
EKG QTC CALCULATION (BAZETT): 441 MS
EKG R AXIS: 11 DEGREES
EKG T AXIS: 74 DEGREES
EKG VENTRICULAR RATE: 67 BPM

## 2020-05-29 ENCOUNTER — HOSPITAL ENCOUNTER (OUTPATIENT)
Dept: MAMMOGRAPHY | Age: 70
Discharge: HOME | End: 2020-05-31
Payer: MEDICARE

## 2020-05-29 ENCOUNTER — HOSPITAL ENCOUNTER (OUTPATIENT)
Dept: ULTRASOUND IMAGING | Age: 70
Discharge: HOME | End: 2020-05-31
Payer: MEDICARE

## 2020-05-29 PROCEDURE — G0279 TOMOSYNTHESIS, MAMMO: HCPCS

## 2020-05-29 PROCEDURE — 76642 ULTRASOUND BREAST LIMITED: CPT

## 2020-06-01 LAB — CYTOLOGY REPORT: NORMAL

## 2020-06-16 ENCOUNTER — HOSPITAL ENCOUNTER (OUTPATIENT)
Dept: BONE DENSITY | Age: 70
Discharge: HOME | End: 2020-06-18
Payer: MEDICARE

## 2020-06-16 ENCOUNTER — HOSPITAL ENCOUNTER (OUTPATIENT)
Dept: LAB | Age: 70
Discharge: HOME | End: 2020-06-16
Payer: MEDICARE

## 2020-06-16 LAB — VITAMIN D 25-HYDROXY: 39.6 NG/ML (ref 30–100)

## 2020-06-16 PROCEDURE — 36415 COLL VENOUS BLD VENIPUNCTURE: CPT

## 2020-06-16 PROCEDURE — 77085 DXA BONE DENSITY AXL VRT FX: CPT

## 2020-06-16 PROCEDURE — 82306 VITAMIN D 25 HYDROXY: CPT

## 2020-06-18 RX ORDER — ACETAMINOPHEN 160 MG
1000 TABLET,DISINTEGRATING ORAL
COMMUNITY
End: 2021-07-07

## 2020-11-24 RX ORDER — OMEPRAZOLE 20 MG/1
CAPSULE, DELAYED RELEASE ORAL
Qty: 30 CAPSULE | Refills: 0 | Status: SHIPPED | OUTPATIENT
Start: 2020-11-24 | End: 2021-07-07 | Stop reason: SDUPTHER

## 2020-12-16 RX ORDER — OMEPRAZOLE 20 MG/1
20 CAPSULE, DELAYED RELEASE ORAL DAILY
Qty: 30 CAPSULE | Refills: 3 | Status: SHIPPED | OUTPATIENT
Start: 2020-12-16 | End: 2021-07-07

## 2020-12-16 NOTE — TELEPHONE ENCOUNTER
Patient requesting refill of omeprazole 20mg. Sent to [facility] in [place]. Patient states she has been calling in each month for refills and wants to know if she can have more refills.

## 2020-12-16 NOTE — TELEPHONE ENCOUNTER
Patient requesting refill of omeprazole. Medication pending for Dr. Shamika Shcmidt approval.     Kyler Mccall 10/16/2018.

## 2020-12-23 RX ORDER — OMEPRAZOLE 20 MG/1
20 CAPSULE, DELAYED RELEASE ORAL DAILY
Qty: 30 CAPSULE | Refills: 3 | Status: SHIPPED | OUTPATIENT
Start: 2020-12-23 | End: 2021-06-25

## 2020-12-23 NOTE — TELEPHONE ENCOUNTER
Patient requesting refill of Omeprazole 20 mg. Please send to [place]. She has enough to last her until Saturday. Please call her back at [phone].

## 2021-04-12 DIAGNOSIS — Z12.31 VISIT FOR SCREENING MAMMOGRAM: Primary | ICD-10-CM

## 2021-05-27 ENCOUNTER — HOSPITAL ENCOUNTER (OUTPATIENT)
Dept: MAMMOGRAPHY | Age: 71
Discharge: HOME | End: 2021-05-29
Payer: MEDICARE

## 2021-05-27 DIAGNOSIS — Z12.31 VISIT FOR SCREENING MAMMOGRAM: ICD-10-CM

## 2021-05-27 PROCEDURE — 77063 BREAST TOMOSYNTHESIS BI: CPT

## 2021-06-02 ENCOUNTER — HOSPITAL ENCOUNTER (OUTPATIENT)
Age: 71
Setting detail: SPECIMEN
Discharge: HOME | End: 2021-06-02
Payer: MEDICARE

## 2021-06-02 ENCOUNTER — OFFICE VISIT (OUTPATIENT)
Dept: OBGYN | Age: 71
End: 2021-06-02
Payer: MEDICARE

## 2021-06-02 VITALS
WEIGHT: 164.4 LBS | DIASTOLIC BLOOD PRESSURE: 74 MMHG | SYSTOLIC BLOOD PRESSURE: 110 MMHG | HEIGHT: 65 IN | BODY MASS INDEX: 27.39 KG/M2 | HEART RATE: 68 BPM

## 2021-06-02 DIAGNOSIS — Z12.31 ENCOUNTER FOR SCREENING MAMMOGRAM FOR BREAST CANCER: ICD-10-CM

## 2021-06-02 DIAGNOSIS — Z01.419 WELL WOMAN EXAM WITH ROUTINE GYNECOLOGICAL EXAM: ICD-10-CM

## 2021-06-02 DIAGNOSIS — Z01.419 WELL WOMAN EXAM WITH ROUTINE GYNECOLOGICAL EXAM: Primary | ICD-10-CM

## 2021-06-02 PROCEDURE — 99397 PER PM REEVAL EST PAT 65+ YR: CPT

## 2021-06-02 PROCEDURE — 87624 HPV HI-RISK TYP POOLED RSLT: CPT

## 2021-06-02 PROCEDURE — G0145 SCR C/V CYTO,THINLAYER,RESCR: HCPCS

## 2021-06-02 PROCEDURE — 99397 PER PM REEVAL EST PAT 65+ YR: CPT | Performed by: OBSTETRICS & GYNECOLOGY

## 2021-06-02 PROCEDURE — G0463 HOSPITAL OUTPT CLINIC VISIT: HCPCS

## 2021-06-02 NOTE — PROGRESS NOTES
History and Physical  [address], [address] [facility] [address], [address] [phone]   Fax [phone]  Dustin Manzo  2021              72 y.o. Chief Complaint   Patient presents with    Gynecologic Exam       Patient's last menstrual period was 2007 (approximate). Primary Care Physician: Peg Ward MD    The patient was seen and examined. She has no chief complaint today and is here for her annual exam.  Her bowels are regular. There are no voiding complaints. She denies any bloating. She denies vaginal discharge and was counseled on STD's and the need for barrier contraception.      HPI : Dustin Manzo is a 72 y.o. female     Pt presents to office for annual exam. Pt without c/c.   ________________________________________________________________________  OB History    Para Term  AB Living   3 3 2 0 0 2   SAB TAB Ectopic Molar Multiple Live Births   0 0 0 0 0 0      # Outcome Date GA Lbr Caesar/2nd Weight Sex Delivery Anes PTL Lv   3 Para     F       2 Term     F Vag-Spont      1 Term     M Vag-Spont        Past Medical History:   Diagnosis Date    Cyst of ovary     LEFT    H/O heartburn     TUMs as needed    Hx of blood clots     RT LEG-DURRING VEIN STRIPPING    Lumbar radiculopathy     Rib fracture 2015    fell outside while walking dogs-LEFT    Rosacea 2009    CREAM AS NEEDED    Snores     Stress fracture of metatarsal bone of left foot 2020    2nd metatarsal    Wears contact lenses                                                                    Past Surgical History:   Procedure Laterality Date    BACK SURGERY Left 2017    microdiscectomy L4-5    DILATION AND CURETTAGE OF UTERUS      DILATION AND CURETTAGE OF UTERUS N/A 4/10/2019    D & C HYSTEROSCOPY & polypectomy performed by Jefry Roger MD at [address] Left 2017    MICRODISCECTOMY L4-5, (PRONE), BRYANT TABLE, Salomon Menjivar, C-ARM, MICROSCOPE performed by Nory Clarke MD at [address] [place]    Removal of birth [place]. -RT AXILLIA    [place] COLONOSCOPY FLX DX W/COLLJ SPEC WHEN PFRMD N/A 2018    COLONOSCOPY performed by Vergil Meckel, MD at [facility] ENDOSCOPY N/A 2018    EGD BIOPSY performed by Vergil Meckel, MD at [address] Bilateral 2020    , patient told from knee down was only 2 veins not three     Family History   Problem Relation Age of Onset    Uterine Cancer Mother     Obesity Mother     Hypertension Mother     Diabetes Mother         NIDDM    Other Mother 80        Muscle disorder    Breast Cancer Maternal Aunt     Other Brother 32         IN TRAIN VERSUS TRUCK ACCIDENT    No Known Problems Paternal Aunt     No Known Problems Paternal Aunt     No Known Problems Paternal Aunt      Social History     Socioeconomic History    Marital status:      Spouse name: Not on file    Number of children: Not on file    Years of education: Not on file    Highest education level: Not on file   Occupational History    Not on file   Tobacco Use    Smoking status: Never Smoker    Smokeless tobacco: Never Used   Vaping Use    Vaping Use: Never used   Substance and Sexual Activity    Alcohol use: No     Alcohol/week: 0.0 standard drinks    Drug use: No    Sexual activity: Never     Birth control/protection: Post-menopausal     Comment: Menarche age 16   Menopause age 49   Other Topics Concern    Not on file   Social History Narrative    Not on file     Social Determinants of Health     Financial Resource Strain:     Difficulty of Paying Living Expenses:    Food Insecurity:     Worried About Running Out of Food in the Last Year:     [address] in the Last Year:    Transportation Needs:     Lack of Transportation (Medical):      Lack of Transportation (Non-Medical):    Physical Activity:     Days of Exercise per Week:     Minutes of Exercise per Session:    Stress:     Feeling of Stress :    Social Connections:     Frequency of Communication with Friends and Family:     Frequency of Social Gatherings with Friends and Family:     Attends Church Services:     Active Member of Clubs or Organizations:     Attends Club or Organization Meetings:     Marital Status:    Intimate Partner Violence:     Fear of Current or Ex-Partner:     Emotionally Abused:     Physically Abused:     Sexually Abused:        MEDICATIONS:  Current Outpatient Medications   Medication Sig Dispense Refill    omeprazole (PRILOSEC) 20 MG delayed release capsule Take 1 capsule by mouth daily 30 capsule 3    omeprazole (PRILOSEC) 20 MG delayed release capsule Take 1 capsule by mouth daily 30 capsule 3    omeprazole (PRILOSEC) 20 MG delayed release capsule Take 1 capsule by mouth once daily 30 capsule 0    Cholecalciferol (VITAMIN D3) 50 MCG (2000 UT) CAPS Take 1,000 Units by mouth Take times wekkly      doxycycline hyclate (VIBRA-TABS) 100 MG tablet       calcium-vitamin D (OSCAL-500) 500-200 MG-UNIT per tablet Take 1 tablet by mouth daily LAST DOSE 06/11/2017      vitamin E 100 units capsule Take by mouth daily (Patient not taking: Reported on 6/2/2021)      Multiple Vitamins-Minerals (MULTI-VITAMIN MENOPAUSAL) TABS Take 1 tablet by mouth once for 1 dose 100 tablet 2    ibuprofen (ADVIL;MOTRIN) 800 MG tablet Take 1 tablet by mouth every 8 hours as needed for Pain 15 tablet 1     No current facility-administered medications for this visit. ALLERGIES:  Allergies as of 06/02/2021    (No Known Allergies)       Symptoms of decreased mood absent    **If either question is answered in a  positive fashion then complete the PHQ9 Scoring Evaluation and make the appropriate referral**      Immunization status: up to date and documented.       Gynecologic History:  Menarche: 16 yo  Menopause at 48 yo PHYSICAL Exam:     Constitutional:  Vitals:    06/02/21 1105   BP: 110/74   Site: Left Upper Arm   Position: Sitting   Cuff Size: Medium Adult   Pulse: 68   Weight: 164 lb 6.4 oz (74.6 kg)   Height: 5' 4.5\" (1.638 m)       Chaperone for Intimate Exam   Chaperone was offered and accepted as part of the rooming process.  Chaperone: Meche          General Appearance: This  is a well Developed, well Nourished, well groomed female. Her BMI was reviewed. Nutritional decision making was discussed. Skin:  There was a Normal Inspection of the skin without rashes or lesions. There were no rashes. (Papular, Maculopapular, Hives, Pustular, Macular)     There were no lesions (Ulcers, Erythema, Abn. Appearing Nevi)            Lymphatic:  No Lymph Nodes were Palpable in the neck , axilla or groin. # Of Lymph Nodes; Location ; Character [Normal]  [Shotty] [Tender] [Enlarged]     Neck and EENT:  The neck was supple. There were no masses   The thyroid was not enlarged and had no masses. Perrla, EOMI B/L, TMI B/L No Abnormalities. Throat inspected-No exudates or Masses, Nares Patent No Masses        Respiratory: The lungs were auscultated and found to be clear. There were no rales, rhonchi or wheezes. There was a good respiratory effort. Cardiovascular: The heart was in a regular rate and rhythm. . No S3 or S4. There was no murmur appreciated. Location, grade, and radiation are not applicable. Extremities: The patients extremities were without calf tenderness, edema, or varicosities. There was full range of motion in all four extremities. Pulses in all four extremities were appreciated and are 2/4. Abdomen: The abdomen was soft and non-tender.  There were good bowel sounds in all quadrants and there was no guarding, rebound or rigidity. On evaluation there was no evidence of hepatosplenomegaly and there was no costal vertebral rani tenderness bilaterally. No hernias were appreciated. Abdominal Scars:     Psych: The patient had a normal Orientation to: Time, Place, Person, and Situation  There is no Mood / Affect changes    Breast:  (Chest)  normal appearance, no masses or tenderness  Self breast exams were reviewed in detail. Literature was given. Pelvic Exam:  Vulva and vagina appear normal. Bimanual exam reveals normal uterus and adnexa. Rectal Exam:  exam declined by patient          Musculosk:  Normal Gait and station was noted. Digits were evaluated without abnormal findings. Range of motion, stability and strength were evaluated and found to be appropriate for the patients age. OMM Structural Component:  The patient did not complain of a Chief complaint requiring OMM. Chief Complaint:none    Structural Exam: No Interest                ASSESSMENT:      72 y.o. Annual   Diagnosis Orders   1. Well woman exam with routine gynecological exam     2. Encounter for screening mammogram for breast cancer  [facility] JOVANNI DIGITAL SCREEN BILATERAL          Chief Complaint   Patient presents with    Gynecologic Exam          Past Medical History:   Diagnosis Date    Cyst of ovary 2008    LEFT    H/O heartburn     TUMs as needed    Hx of blood clots 2012    RT LEG-DURRING VEIN STRIPPING    Lumbar radiculopathy     Rib fracture 03/02/2015    fell outside while walking dogs-LEFT    Rosacea 2009    CREAM AS NEEDED    Snores     Stress fracture of metatarsal bone of left foot 04/2020    2nd metatarsal    Wears contact lenses          Patient Active Problem List    Diagnosis Date Noted    Herniated nucleus pulposus, L4-5 06/23/2017          Hereditary Breast, Ovarian, Colon and Uterine Cancer screening Done.           Tobacco & Secondary smoke risks reviewed; instructed on cessation and avoidance      Counseling Completed:  Preventative Health Recommendations and Follow up. The patient was informed of the recommended preventative health recommendations. 1. Annuals every year; Cytology collections per prevailing guidelines. 2. Mammograms begin every year at 37 yo if no abnormalities are found and no family history. 3. Bone density studies every 2-3 years. Begin at 71 yo. If no fracture history or osteoporosis family history. (significant). 4. Colonoscopy begin at 40 yo. Repeat every ten years if negative and no family history. 5. Calcium of 0024-7213 mg/day in split dosing  6. Vitamin D 400-800 IU/day  7. All other preventative health recommendations will be managed by the patients Primary care physician. PLAN:  Return in about 1 year (around 6/2/2022) for Annual Exam.  Repeat Annual every 1 year  Cervical Cytology Evaluation begins at 24years old. If Negative Cytology, Follow-up screening per current guidelines. Mammograms every 1 year. If 37 yo and last mammogram was negative. Calcium and Vitamin D dosing reviewed. Colonoscopy screening reviewed as well as onset for bone density testing. Birth control and barrier recommendations discussed. STD counseling and prevention reviewed. Gardisil counseling completed for all patients 10-37 yo. Routine health maintenance per patients PCP. Orders Placed This Encounter   Procedures    [facility] JOVANNI DIGITAL SCREEN BILATERAL     Order Specific Question:   Reason for exam:     Answer:   screening           The patient, Jeff Glass is a 72 y.o. female, was seen with a total time spent of 20 minutes for the visit on this date of service by the E/M provider. The time component had both face to face and non face to face time spent in determining the total time component. Counseling and education regarding her diagnosis listed below and her options regarding those diagnoses were also included in determining her time component. Diagnosis Orders   1. Well woman exam with routine gynecological exam     2. Encounter for screening mammogram for breast cancer  [facility] JOVANNI DIGITAL SCREEN BILATERAL        The patient had her preventative health maintenance recommendations and follow-up reviewed with her at the completion of her visit.

## 2021-06-03 LAB
HPV SAMPLE: NORMAL
HPV, GENOTYPE 16: NOT DETECTED
HPV, GENOTYPE 18: NOT DETECTED
HPV, HIGH RISK OTHER: NOT DETECTED
HPV, INTERPRETATION: NORMAL
SPECIMEN DESCRIPTION: NORMAL

## 2021-06-04 LAB — CYTOLOGY REPORT: NORMAL

## 2021-06-25 RX ORDER — OMEPRAZOLE 20 MG/1
CAPSULE, DELAYED RELEASE ORAL
Qty: 30 CAPSULE | Refills: 0 | Status: SHIPPED | OUTPATIENT
Start: 2021-06-25 | End: 2021-07-07 | Stop reason: SDUPTHER

## 2021-07-07 ENCOUNTER — OFFICE VISIT (OUTPATIENT)
Dept: INTERNAL MEDICINE | Age: 71
End: 2021-07-07
Payer: MEDICARE

## 2021-07-07 ENCOUNTER — HOSPITAL ENCOUNTER (OUTPATIENT)
Dept: GENERAL RADIOLOGY | Age: 71
Discharge: HOME | End: 2021-07-09
Payer: MEDICARE

## 2021-07-07 ENCOUNTER — TELEPHONE (OUTPATIENT)
Dept: INTERNAL MEDICINE | Age: 71
End: 2021-07-07

## 2021-07-07 VITALS
SYSTOLIC BLOOD PRESSURE: 122 MMHG | HEIGHT: 65 IN | RESPIRATION RATE: 18 BRPM | DIASTOLIC BLOOD PRESSURE: 70 MMHG | BODY MASS INDEX: 27.82 KG/M2 | WEIGHT: 167 LBS | HEART RATE: 60 BPM

## 2021-07-07 DIAGNOSIS — M25.552 LEFT HIP PAIN: ICD-10-CM

## 2021-07-07 DIAGNOSIS — E55.9 VITAMIN D DEFICIENCY: ICD-10-CM

## 2021-07-07 DIAGNOSIS — Z00.00 ROUTINE PHYSICAL EXAMINATION: ICD-10-CM

## 2021-07-07 DIAGNOSIS — Z91.81 AT HIGH RISK FOR FALLS: ICD-10-CM

## 2021-07-07 DIAGNOSIS — Z13.220 SCREENING FOR HYPERLIPIDEMIA: ICD-10-CM

## 2021-07-07 DIAGNOSIS — K21.9 GASTROESOPHAGEAL REFLUX DISEASE WITHOUT ESOPHAGITIS: Primary | ICD-10-CM

## 2021-07-07 DIAGNOSIS — L71.9 ROSACEA: ICD-10-CM

## 2021-07-07 DIAGNOSIS — R23.2 HOT FLASHES: ICD-10-CM

## 2021-07-07 PROCEDURE — 99204 OFFICE O/P NEW MOD 45 MIN: CPT | Performed by: NURSE PRACTITIONER

## 2021-07-07 PROCEDURE — 73502 X-RAY EXAM HIP UNI 2-3 VIEWS: CPT

## 2021-07-07 RX ORDER — GUARN/MA-HUANG/P.GIN/S.GINSENG
TABLET ORAL
COMMUNITY

## 2021-07-07 RX ORDER — OMEPRAZOLE 20 MG/1
20 CAPSULE, DELAYED RELEASE ORAL DAILY
Qty: 90 CAPSULE | Refills: 1 | Status: SHIPPED | OUTPATIENT
Start: 2021-07-07 | End: 2022-01-05 | Stop reason: SDUPTHER

## 2021-07-07 ASSESSMENT — PATIENT HEALTH QUESTIONNAIRE - PHQ9
SUM OF ALL RESPONSES TO PHQ9 QUESTIONS 1 & 2: 0
1. LITTLE INTEREST OR PLEASURE IN DOING THINGS: 0
2. FEELING DOWN, DEPRESSED OR HOPELESS: 0
SUM OF ALL RESPONSES TO PHQ QUESTIONS 1-9: 0

## 2021-07-07 NOTE — PROGRESS NOTES
07/07/21  Oliverio Leblanc  1950      Chief Complaint:   1. Gastroesophageal reflux disease without esophagitis    2. Vitamin D deficiency    3. Rosacea    4. At high risk for falls    5. Left hip pain    6. Routine physical examination    7. Hot flashes        HPI:  60-year-old patient in new to establish into internal medicine. She has been on omeprazole greater than 3 years for gastritis/GERD. States she has previously tried just stopping the medication and that has not worked for her. Wants to try to wean herself off of these medications. Does not want to be on more medications than she needs. She continues on supplemental vitamin D for deficiency. Denies any unwarranted side effects. Again she does not like medication so she is taking her doxycycline for her rosacea twice a week. States her dermatologist is aware of that. States this pretty much keeps it at [place] [id number]. Of most concern to patient as she has had some ongoing issues with bilateral knee pain which she sees an orthopedist for but more recently issues with left hip groin pain unsure if it is because of how she is walking due to the knees. States sometimes is hard to even lift the left leg up. States always goes up first on the stairs with the right leg.   She has seen GYN for routine pelvic exam.  States she forgot to talk to them about her hot flashes we will reach out to them to see if they would consider using an estradiol vaginal cream      No Known Allergies    Past Medical History:   Diagnosis Date    Cyst of ovary 2008    LEFT    Fracture 2007    left pinky finger fx due to fall    H/O heartburn     TUMs as needed    Hx of blood clots 2012    RT LEG-DURRING VEIN STRIPPING    Lumbar radiculopathy     Rib fracture 03/02/2015    fell outside while walking dogs-LEFT    Rosacea 2009    CREAM AS NEEDED    Snores     Stress fracture of metatarsal bone of left foot 04/2020    2nd metatarsal    Wears contact lenses        Past Surgical History:   Procedure Laterality Date    BACK SURGERY Left 06/21/2017    microdiscectomy L4-5    DILATION AND CURETTAGE OF UTERUS  1980    DILATION AND CURETTAGE OF UTERUS N/A 4/10/2019    D & C HYSTEROSCOPY & polypectomy performed by Ana Pulido MD at [address] Left 6/21/2017    MICRODISCECTOMY L4-5, (PRONE), BRYANT TABLE, Melania Furnace, C-ARM, MICROSCOPE performed by Joel Figueroa MD at [address] Right 1981    Removal of birth [place]. -RT AXILLIA    [place] COLONOSCOPY FLX DX W/COLLJ SPEC WHEN PFRMD N/A 7/16/2018    COLONOSCOPY performed by George Schumacher MD at [address] N/A 7/16/2018    EGD BIOPSY performed by George Schumacher MD at [address] Bilateral 06/2020    , patient told from knee down was only 2 veins not three       Current Outpatient Medications on File Prior to Visit   Medication Sig Dispense Refill    vitamin D (CHOLECALCIFEROL) 25 MCG (1000 UT) TABS tablet Take 1,000 Units by mouth three times a week      Multiple Vitamin (MULTIVITAMIN ADULT PO) Take 1 tablet by mouth daily      Calcium 600-200 MG-UNIT TABS Calcium with Vitamin D. Take 1 tablet by mouth every day Except Sunday.  doxycycline hyclate (VIBRA-TABS) 100 MG tablet 2 times a week (Tues & Thurs)       No current facility-administered medications on file prior to visit. Social History     Socioeconomic History    Marital status:       Spouse name: Not on file    Number of children: Not on file    Years of education: Not on file    Highest education level: Not on file   Occupational History    Not on file   Tobacco Use    Smoking status: Never Smoker    Smokeless tobacco: Never Used   Vaping Use    Vaping Use: Never used   Substance and Sexual Activity    Alcohol use: No     Alcohol/week: 0.0 standard drinks    Drug use: No    Sexual activity: Never     Birth control/protection: Post-menopausal Comment: Menarche age 16   Menopause age 49   Other Topics Concern    Not on file   Social History Narrative    Not on file     Social Determinants of Health     Financial Resource Strain:     Difficulty of Paying Living Expenses:    Food Insecurity:     Worried About Running Out of Food in the Last Year:     [address] in the Last Year:    Transportation Needs:     Lack of Transportation (Medical):  Lack of Transportation (Non-Medical):    Physical Activity:     Days of Exercise per Week:     Minutes of Exercise per Session:    Stress:     Feeling of Stress :    Social Connections:     Frequency of Communication with Friends and Family:     Frequency of Social Gatherings with Friends and Family:     Attends Rastafarian Services:     Active Member of Clubs or Organizations:     Attends Club or Organization Meetings:     Marital Status:    Intimate Partner Violence:     Fear of Current or Ex-Partner:     Emotionally Abused:     Physically Abused:     Sexually Abused:        Review of Systems   Constitutional: Negative for activity change, appetite change, chills, fatigue, fever and unexpected weight change. HENT: Negative for congestion, dental problem, ear discharge, ear pain, facial swelling, hearing loss, postnasal drip, rhinorrhea, sinus pressure, sore throat and trouble swallowing. Eyes: Negative for pain and visual disturbance. Respiratory: Negative for cough, chest tightness, shortness of breath and wheezing. Cardiovascular: Negative for chest pain, palpitations and leg swelling. Gastrointestinal: Negative for abdominal pain, blood in stool, constipation, diarrhea, nausea and vomiting. Endocrine: Negative for cold intolerance, heat intolerance and polyuria. Hot flashes   Genitourinary: Negative for difficulty urinating. Musculoskeletal: Positive for arthralgias, gait problem and myalgias. Negative for neck pain and neck stiffness.    Skin: Negative for color change, rash and wound. Neurological: Negative for dizziness, tremors, seizures, weakness, light-headedness, numbness and headaches. Psychiatric/Behavioral: Negative for confusion and hallucinations. The patient is not nervous/anxious. Physical Exam  Vitals and nursing note reviewed. Constitutional:       General: She is not in acute distress. Appearance: Normal appearance. She is well-developed. She is not diaphoretic. HENT:      Head: Normocephalic and atraumatic. Right Ear: Ear canal and external ear normal.      Left Ear: Ear canal and external ear normal.   Eyes:      General:         Right eye: No discharge. Left eye: No discharge. Neck:      Vascular: No carotid bruit. Trachea: No tracheal deviation. Cardiovascular:      Rate and Rhythm: Normal rate and regular rhythm. Heart sounds: Normal heart sounds. No murmur heard. No friction rub. No gallop. Pulmonary:      Effort: Pulmonary effort is normal. No respiratory distress. Breath sounds: Normal breath sounds. No stridor. No wheezing, rhonchi or rales. Chest:      Chest wall: No tenderness. Abdominal:      General: Bowel sounds are normal. There is no distension. Palpations: Abdomen is soft. Tenderness: There is no abdominal tenderness. Musculoskeletal:         General: No swelling. Cervical back: No tenderness. Left hip: No deformity, lacerations, tenderness, bony tenderness or crepitus. Decreased range of motion. Decreased strength. Right lower leg: No edema. Left lower leg: No edema. Skin:     General: Skin is warm and dry. Capillary Refill: Capillary refill takes less than 2 seconds. Coloration: Skin is not jaundiced or pale. Findings: No rash. Neurological:      General: No focal deficit present. Mental Status: She is alert and oriented to person, place, and time. Cranial Nerves: No cranial nerve deficit.       Sensory: No sensory deficit. Motor: No weakness. Coordination: Coordination normal.      Gait: Gait normal.   Psychiatric:         Mood and Affect: Mood normal.         Behavior: Behavior normal.         Thought Content: Thought content normal.         Judgment: Judgment normal.       Vitals:    07/07/21 1427   BP: 122/70   Site: Left Upper Arm   Position: Sitting   Cuff Size: Large Adult   Pulse: 60   Resp: 18   Weight: 167 lb (75.8 kg)   Height: 5' 4.5\" (1.638 m)       Assessment:  1. Gastroesophageal reflux disease without esophagitis  We will have her slowly titrate down on the Prilosec. Taking 1 day away every few weeks to see if she tolerates. - omeprazole (PRILOSEC) 20 MG delayed release capsule; Take 1 capsule by mouth Daily  Dispense: 90 capsule; Refill: 1    2. Vitamin D deficiency  Continues on supplemental vitamin D, serial lab follow-ups  - Vitamin D 25 Hydroxy; Future    3. Rosacea  Continues to follow with Dr. Aure Paul in [place]. Stable on doxycycline twice a week. 4. At high risk for falls  Fall precautions    5. Left hip pain  X-ray today. Possible PT  - XR HIP LEFT (2-3 VIEWS); Future    6. Routine physical examination  - Comprehensive Metabolic Panel; Future  - Lipid Panel; Future  - CBC Auto Differential; Future    7. Hot flashes  We will reach out to GYN regarding questionable vaginal cream    8. Bilateral knee arthritis  Stable at present, following with Dr. Nixon Masury:  As noted above. History of an EGD and colonoscopy of 2018. Follow-up: Will be due in 2028. Patient with recent normal mammogram May 2021. Had a normal DEXA scan in 2020. We will see her back in 3 months for annual wellness visit. She will get her routine fasting labs in the next week or 2. We will call results at that time  Follow up for routine visit. Call sooner with concerns prior.     Electronically signed by ELANA Bal CNP on 7/7/2021 at 3:01 PM  On the basis of positive falls risk screening, assessment and plan is as follows: home safety tips provided.

## 2021-07-07 NOTE — TELEPHONE ENCOUNTER
Dr Aashish Navas saw this patient at the end of June.   Please review her chart and let me know what you suggest.

## 2021-07-07 NOTE — TELEPHONE ENCOUNTER
Patient having increased hot flashes. Patient was previously on oral estradiol but was taken off of it by former GYN.  Please send note to Dr. Gregg Castro per PCP - Please advise

## 2021-07-08 ASSESSMENT — ENCOUNTER SYMPTOMS
EYE PAIN: 0
SHORTNESS OF BREATH: 0
NAUSEA: 0
WHEEZING: 0
VOMITING: 0
CHEST TIGHTNESS: 0
RHINORRHEA: 0
DIARRHEA: 0
ABDOMINAL PAIN: 0
CONSTIPATION: 0
COUGH: 0
COLOR CHANGE: 0
TROUBLE SWALLOWING: 0
SORE THROAT: 0
BLOOD IN STOOL: 0
SINUS PRESSURE: 0
FACIAL SWELLING: 0

## 2021-07-08 NOTE — RESULT ENCOUNTER NOTE
Patient notified of results by phone.  She is going to hold off on PT for now and let us know when she is ready to start PT.

## 2021-09-30 ENCOUNTER — HOSPITAL ENCOUNTER (OUTPATIENT)
Dept: LAB | Age: 71
Discharge: HOME | End: 2021-09-30
Payer: MEDICARE

## 2021-09-30 DIAGNOSIS — Z00.00 ROUTINE PHYSICAL EXAMINATION: ICD-10-CM

## 2021-09-30 DIAGNOSIS — E55.9 VITAMIN D DEFICIENCY: ICD-10-CM

## 2021-09-30 DIAGNOSIS — Z13.220 SCREENING FOR HYPERLIPIDEMIA: ICD-10-CM

## 2021-09-30 LAB
ABSOLUTE EOS #: 0.11 K/UL (ref 0–0.44)
ABSOLUTE IMMATURE GRANULOCYTE: <0.03 K/UL (ref 0–0.3)
ABSOLUTE LYMPH #: 2.74 K/UL (ref 1.1–3.7)
ABSOLUTE MONO #: 0.73 K/UL (ref 0.1–1.2)
ALBUMIN SERPL-MCNC: 4.4 G/DL (ref 3.5–5.2)
ALBUMIN/GLOBULIN RATIO: 1.5 (ref 1–2.5)
ALP BLD-CCNC: 94 U/L (ref 35–104)
ALT SERPL-CCNC: 23 U/L (ref 5–33)
ANION GAP SERPL CALCULATED.3IONS-SCNC: 11 MMOL/L (ref 9–17)
AST SERPL-CCNC: 29 U/L
BASOPHILS # BLD: 0 % (ref 0–2)
BASOPHILS ABSOLUTE: 0.03 K/UL (ref 0–0.2)
BILIRUB SERPL-MCNC: 0.34 MG/DL (ref 0.3–1.2)
BUN BLDV-MCNC: 11 MG/DL (ref 8–23)
BUN/CREAT BLD: 19 (ref 9–20)
CALCIUM SERPL-MCNC: 9.9 MG/DL (ref 8.6–10.4)
CHLORIDE BLD-SCNC: 105 MMOL/L (ref 98–107)
CHOLESTEROL/HDL RATIO: 3
CHOLESTEROL: 157 MG/DL
CO2: 26 MMOL/L (ref 20–31)
CREAT SERPL-MCNC: 0.59 MG/DL (ref 0.5–0.9)
DIFFERENTIAL TYPE: NORMAL
EOSINOPHILS RELATIVE PERCENT: 2 % (ref 1–4)
GFR AFRICAN AMERICAN: >60 ML/MIN
GFR NON-AFRICAN AMERICAN: >60 ML/MIN
GFR SERPL CREATININE-BSD FRML MDRD: NORMAL ML/MIN/{1.73_M2}
GFR SERPL CREATININE-BSD FRML MDRD: NORMAL ML/MIN/{1.73_M2}
GLUCOSE BLD-MCNC: 94 MG/DL (ref 70–99)
HCT VFR BLD CALC: 42.9 % (ref 36.3–47.1)
HDLC SERPL-MCNC: 52 MG/DL
HEMOGLOBIN: 14.2 G/DL (ref 11.9–15.1)
IMMATURE GRANULOCYTES: 0 %
LDL CHOLESTEROL: 77 MG/DL (ref 0–130)
LYMPHOCYTES # BLD: 41 % (ref 24–43)
MCH RBC QN AUTO: 31.3 PG (ref 25.2–33.5)
MCHC RBC AUTO-ENTMCNC: 33.1 G/DL (ref 25.2–33.5)
MCV RBC AUTO: 94.5 FL (ref 82.6–102.9)
MONOCYTES # BLD: 11 % (ref 3–12)
NRBC AUTOMATED: 0 PER 100 WBC
PDW BLD-RTO: 12.6 % (ref 11.8–14.4)
PLATELET # BLD: 237 K/UL (ref 138–453)
PLATELET ESTIMATE: NORMAL
PMV BLD AUTO: 11.8 FL (ref 8.1–13.5)
POTASSIUM SERPL-SCNC: 4.7 MMOL/L (ref 3.7–5.3)
RBC # BLD: 4.54 M/UL (ref 3.95–5.11)
RBC # BLD: NORMAL 10*6/UL
SEG NEUTROPHILS: 46 % (ref 36–65)
SEGMENTED NEUTROPHILS ABSOLUTE COUNT: 3.13 K/UL (ref 1.5–8.1)
SODIUM BLD-SCNC: 142 MMOL/L (ref 135–144)
TOTAL PROTEIN: 7.3 G/DL (ref 6.4–8.3)
TRIGL SERPL-MCNC: 142 MG/DL
VITAMIN D 25-HYDROXY: 47.2 NG/ML (ref 30–100)
VLDLC SERPL CALC-MCNC: NORMAL MG/DL (ref 1–30)
WBC # BLD: 6.8 K/UL (ref 3.5–11.3)
WBC # BLD: NORMAL 10*3/UL

## 2021-09-30 PROCEDURE — 80053 COMPREHEN METABOLIC PANEL: CPT

## 2021-09-30 PROCEDURE — 36415 COLL VENOUS BLD VENIPUNCTURE: CPT

## 2021-09-30 PROCEDURE — 80061 LIPID PANEL: CPT

## 2021-09-30 PROCEDURE — 85025 COMPLETE CBC W/AUTO DIFF WBC: CPT

## 2021-09-30 PROCEDURE — 82306 VITAMIN D 25 HYDROXY: CPT

## 2021-10-07 ENCOUNTER — OFFICE VISIT (OUTPATIENT)
Dept: INTERNAL MEDICINE | Age: 71
End: 2021-10-07
Payer: MEDICARE

## 2021-10-07 VITALS
WEIGHT: 161.8 LBS | HEART RATE: 80 BPM | DIASTOLIC BLOOD PRESSURE: 64 MMHG | SYSTOLIC BLOOD PRESSURE: 116 MMHG | HEIGHT: 65 IN | BODY MASS INDEX: 26.96 KG/M2

## 2021-10-07 DIAGNOSIS — M17.0 BILATERAL PRIMARY OSTEOARTHRITIS OF KNEE: ICD-10-CM

## 2021-10-07 DIAGNOSIS — L71.9 ROSACEA: ICD-10-CM

## 2021-10-07 DIAGNOSIS — K21.9 GASTROESOPHAGEAL REFLUX DISEASE WITHOUT ESOPHAGITIS: Primary | ICD-10-CM

## 2021-10-07 DIAGNOSIS — E55.9 VITAMIN D DEFICIENCY: ICD-10-CM

## 2021-10-07 DIAGNOSIS — R23.2 HOT FLASHES: ICD-10-CM

## 2021-10-07 PROCEDURE — 99214 OFFICE O/P EST MOD 30 MIN: CPT | Performed by: NURSE PRACTITIONER

## 2021-10-07 PROCEDURE — 99212 OFFICE O/P EST SF 10 MIN: CPT | Performed by: NURSE PRACTITIONER

## 2021-10-07 NOTE — PROGRESS NOTES
10/07/21  Johnson Gutiérrez  1950      Chief Complaint:   1. Gastroesophageal reflux disease without esophagitis    2. Vitamin D deficiency    3. Hot flashes    4. Bilateral primary osteoarthritis of knee    5. Rosacea        HPI:  27-year-old patient in for 3-month follow-up of above chronic health conditions. Overall she has been doing well. Reflux has been stable. States she tried weaning herself off of the omeprazole and just does not work. Watches what she eats. She continues on supplemental vitamin D for deficiency. Denies any unwarranted side effects. Continues to follow with dermatology for her rosacea. No recent flares. Most concern to her was hot flashes. She reached out to GYN and they told her she could not have any estrogen cream as she still has her uterus. Patient stated understanding. Did review chart with patient and there was recommendation of having her start black [employer] and patient states she will get this at the store. She is undergoing knee surgery in January. Has preop evaluation scheduled at that time.   States due to the Covid pandemic she is not able to get in until January    No Known Allergies    Past Medical History:   Diagnosis Date    Cyst of ovary 2008    LEFT    Fracture 2007    left pinky finger fx due to fall    H/O heartburn     TUMs as needed    Hx of blood clots 2012    RT LEG-DURRING VEIN STRIPPING    Lumbar radiculopathy     Rib fracture 03/02/2015    fell outside while walking dogs-LEFT    Rosacea 2009    CREAM AS NEEDED    Snores     Stress fracture of metatarsal bone of left foot 04/2020    2nd metatarsal    Wears contact lenses        Past Surgical History:   Procedure Laterality Date    BACK SURGERY Left 06/21/2017    microdiscectomy L4-5    DILATION AND CURETTAGE OF UTERUS  1980    DILATION AND CURETTAGE OF UTERUS N/A 4/10/2019    D & C HYSTEROSCOPY & polypectomy performed by Bertha Martínez MD at [address] Left 6/21/2017 MICRODISCECTOMY L4-5, (PRONE), BRYANT TABLE, Kavita Charlotteottoniel, C-ARM, MICROSCOPE performed by Toyin Park MD at [address] [place] [place]    Removal of birth [place]. -RT AXILLIA    VA COLONOSCOPY FLX DX W/COLLJ SPEC WHEN PFRMD N/A 7/16/2018    COLONOSCOPY performed by Heidi Arguelles MD at [address] N/A 7/16/2018    EGD BIOPSY performed by Heidi Arguelles MD at [address] Bilateral 06/2020    , patient told from knee down was only 2 veins not three       Current Outpatient Medications on File Prior to Visit   Medication Sig Dispense Refill    vitamin D (CHOLECALCIFEROL) 25 MCG (1000 UT) TABS tablet Take 1,000 Units by mouth three times a week      Multiple Vitamin (MULTIVITAMIN ADULT PO) Take 1 tablet by mouth daily      Calcium 600-200 MG-UNIT TABS Calcium with Vitamin D. Take 1 tablet by mouth every day Except Sunday.  omeprazole (PRILOSEC) 20 MG delayed release capsule Take 1 capsule by mouth Daily 90 capsule 1    doxycycline hyclate (VIBRA-TABS) 100 MG tablet 2 times a week (Tues & Thurs)       No current facility-administered medications on file prior to visit. Social History     Socioeconomic History    Marital status:       Spouse name: Not on file    Number of children: Not on file    Years of education: Not on file    Highest education level: Not on file   Occupational History    Not on file   Tobacco Use    Smoking status: Never Smoker    Smokeless tobacco: Never Used   Vaping Use    Vaping Use: Never used   Substance and Sexual Activity    Alcohol use: No     Alcohol/week: 0.0 standard drinks    Drug use: No    Sexual activity: Never     Birth control/protection: Post-menopausal     Comment: Menarche age 16   Menopause age 49   Other Topics Concern    Not on file   Social History Narrative    Not on file     Social Determinants of Health     Financial Resource Strain:    [employer] nervous/anxious. Physical Exam  Vitals and nursing note reviewed. Constitutional:       General: She is not in acute distress. Appearance: Normal appearance. She is well-developed. She is not diaphoretic. HENT:      Head: Normocephalic and atraumatic. Right Ear: External ear normal.      Left Ear: External ear normal.   Eyes:      General:         Right eye: No discharge. Left eye: No discharge. Neck:      Trachea: No tracheal deviation. Cardiovascular:      Rate and Rhythm: Normal rate and regular rhythm. Heart sounds: Normal heart sounds. No murmur heard. No friction rub. No gallop. Pulmonary:      Effort: Pulmonary effort is normal. No respiratory distress. Breath sounds: Normal breath sounds. No stridor. No wheezing, rhonchi or rales. Chest:      Chest wall: No tenderness. Abdominal:      General: Bowel sounds are normal. There is no distension. Palpations: Abdomen is soft. Tenderness: There is no abdominal tenderness. Musculoskeletal:         General: No swelling. Skin:     General: Skin is warm and dry. Capillary Refill: Capillary refill takes less than 2 seconds. Coloration: Skin is not jaundiced or pale. Findings: No rash. Neurological:      General: No focal deficit present. Mental Status: She is alert and oriented to person, place, and time. Cranial Nerves: No cranial nerve deficit. Sensory: No sensory deficit. Coordination: Coordination normal.   Psychiatric:         Mood and Affect: Mood normal.         Behavior: Behavior normal.         Thought Content: Thought content normal.         Judgment: Judgment normal.       Vitals:    10/07/21 1324   BP: 116/64   Site: Left Upper Arm   Position: Sitting   Cuff Size: Large Adult   Pulse: 80   Weight: 161 lb 12.8 oz (73.4 kg)   Height: 5' 4.5\" (1.638 m)       Assessment:  1. Gastroesophageal reflux disease without esophagitis  Continues on Prilosec daily. Reflux precautions. Previous EGD with H. pylori. Repeat breath test with no acute finding. 2. Vitamin D deficiency  Stable on supplemental vitamin D.    3. Hot flashes  Per GYN  suggest black cohosh    4. Bilateral primary osteoarthritis of knee  Ms. getting set up for knee replacement in January we will follow-up in office for preop    5. Rosacea  Follows with Dr. Dior Smith in [place]. Continues on doxycycline twice a week      Plan:  As noted above. Patient will get flu shot in a couple weeks. States likes to have it later in the month  Follow up for routine visit. Call sooner with concerns prior.     Electronically signed by Markham Brunner, APRN - CNP on 10/7/2021 at 2:09 PM

## 2021-10-08 ASSESSMENT — ENCOUNTER SYMPTOMS
EYE PAIN: 0
WHEEZING: 0
SHORTNESS OF BREATH: 0
COUGH: 0
TROUBLE SWALLOWING: 0
VOMITING: 0
ABDOMINAL PAIN: 0
NAUSEA: 0
CONSTIPATION: 0
FACIAL SWELLING: 0
BLOOD IN STOOL: 0
RHINORRHEA: 0
CHEST TIGHTNESS: 0
SORE THROAT: 0
DIARRHEA: 0
COLOR CHANGE: 0
SINUS PRESSURE: 0

## 2022-01-05 ENCOUNTER — HOSPITAL ENCOUNTER (OUTPATIENT)
Dept: GENERAL RADIOLOGY | Age: 72
Discharge: HOME | End: 2022-01-07
Payer: MEDICARE

## 2022-01-05 ENCOUNTER — OFFICE VISIT (OUTPATIENT)
Dept: INTERNAL MEDICINE | Age: 72
End: 2022-01-05
Payer: MEDICARE

## 2022-01-05 VITALS
BODY MASS INDEX: 26.82 KG/M2 | SYSTOLIC BLOOD PRESSURE: 118 MMHG | DIASTOLIC BLOOD PRESSURE: 74 MMHG | WEIGHT: 161 LBS | HEART RATE: 76 BPM | HEIGHT: 65 IN

## 2022-01-05 DIAGNOSIS — R23.2 HOT FLASHES: ICD-10-CM

## 2022-01-05 DIAGNOSIS — R94.31 ABNORMAL EKG: ICD-10-CM

## 2022-01-05 DIAGNOSIS — K21.9 GASTROESOPHAGEAL REFLUX DISEASE WITHOUT ESOPHAGITIS: ICD-10-CM

## 2022-01-05 DIAGNOSIS — Z01.811 PRE-OP CHEST EXAM: ICD-10-CM

## 2022-01-05 DIAGNOSIS — M17.11 LOCALIZED OSTEOARTHRITIS OF RIGHT KNEE: ICD-10-CM

## 2022-01-05 DIAGNOSIS — M17.0 BILATERAL PRIMARY OSTEOARTHRITIS OF KNEE: ICD-10-CM

## 2022-01-05 DIAGNOSIS — Z00.00 ROUTINE GENERAL MEDICAL EXAMINATION AT A HEALTH CARE FACILITY: Primary | ICD-10-CM

## 2022-01-05 DIAGNOSIS — E55.9 VITAMIN D DEFICIENCY: ICD-10-CM

## 2022-01-05 DIAGNOSIS — L71.9 ROSACEA: ICD-10-CM

## 2022-01-05 PROCEDURE — 99213 OFFICE O/P EST LOW 20 MIN: CPT | Performed by: NURSE PRACTITIONER

## 2022-01-05 PROCEDURE — 71046 X-RAY EXAM CHEST 2 VIEWS: CPT

## 2022-01-05 PROCEDURE — G0438 PPPS, INITIAL VISIT: HCPCS | Performed by: NURSE PRACTITIONER

## 2022-01-05 RX ORDER — OMEPRAZOLE 20 MG/1
20 CAPSULE, DELAYED RELEASE ORAL DAILY
Qty: 90 CAPSULE | Refills: 3 | Status: SHIPPED | OUTPATIENT
Start: 2022-01-05

## 2022-01-05 ASSESSMENT — ENCOUNTER SYMPTOMS
CHEST TIGHTNESS: 0
SORE THROAT: 0
FACIAL SWELLING: 0
NAUSEA: 0
WHEEZING: 0
SINUS PRESSURE: 0
CONSTIPATION: 0
EYE PAIN: 0
SHORTNESS OF BREATH: 0
COUGH: 0
ABDOMINAL PAIN: 0
DIARRHEA: 0
RHINORRHEA: 0
TROUBLE SWALLOWING: 0
BLOOD IN STOOL: 0
VOMITING: 0
COLOR CHANGE: 0

## 2022-01-05 ASSESSMENT — PATIENT HEALTH QUESTIONNAIRE - PHQ9
SUM OF ALL RESPONSES TO PHQ QUESTIONS 1-9: 0
SUM OF ALL RESPONSES TO PHQ QUESTIONS 1-9: 0
SUM OF ALL RESPONSES TO PHQ9 QUESTIONS 1 & 2: 0
2. FEELING DOWN, DEPRESSED OR HOPELESS: 0
SUM OF ALL RESPONSES TO PHQ QUESTIONS 1-9: 0
SUM OF ALL RESPONSES TO PHQ QUESTIONS 1-9: 0
1. LITTLE INTEREST OR PLEASURE IN DOING THINGS: 0

## 2022-01-05 ASSESSMENT — LIFESTYLE VARIABLES: HOW OFTEN DO YOU HAVE A DRINK CONTAINING ALCOHOL: 0

## 2022-01-05 NOTE — RESULT ENCOUNTER NOTE
Patient notified per phone call. Instructed her to schedule an appt with cardio. Call transferred to Cardiology.

## 2022-01-05 NOTE — PATIENT INSTRUCTIONS
Personalized Preventive Plan for [facility] - 1/5/2022  Medicare offers a range of preventive health benefits. Some of the tests and screenings are paid in full while other may be subject to a deductible, co-insurance, and/or copay. Some of these benefits include a comprehensive review of your medical history including lifestyle, illnesses that may run in your family, and various assessments and screenings as appropriate. After reviewing your medical record and screening and assessments performed today your provider may have ordered immunizations, labs, imaging, and/or referrals for you. A list of these orders (if applicable) as well as your Preventive Care list are included within your After Visit Summary for your review. Other Preventive Recommendations:    · A preventive eye exam performed by an eye specialist is recommended every 1-2 years to screen for glaucoma; cataracts, macular degeneration, and other eye disorders. · A preventive dental visit is recommended every 6 months. · Try to get at least 150 minutes of exercise per week or 10,000 steps per day on a pedometer . · Order or download the FREE \"Exercise & Physical Activity: Your Everyday Guide\" from [employer]. Call 7-675.163.2334 or search [employer] online. · You need 7067-1079 mg of calcium and 9753-8506 IU of vitamin D per day. It is possible to meet your calcium requirement with diet alone, but a vitamin D supplement is usually necessary to meet this goal.  · When exposed to the sun, use a sunscreen that protects against both UVA and UVB radiation with an SPF of 30 or greater. Reapply every 2 to 3 hours or after sweating, drying off with a towel, or swimming. · Always wear a seat belt when traveling in a car. Always wear a helmet when riding a bicycle or motorcycle.

## 2022-01-05 NOTE — PROGRESS NOTES
01/05/22  [facility]  1950      Chief Complaint:   1. Routine general medical examination at a health care facility    2. Gastroesophageal reflux disease without esophagitis    3. Vitamin D deficiency    4. Bilateral primary osteoarthritis of knee    5. Rosacea    6. Hot flashes    7. Pre-op chest exam    8. Localized osteoarthritis of right knee    9. Abnormal EKG        HPI:  80year-old patient in for annual wellness visit and preoperative evaluation for right total knee replacement by Dr. Kristie Mooney at the end of January. Overall patient has been doing well. She tried going off of her omeprazole that did not work well. Asking for refill. Will be due for this in February. She continues on her supplemental vitamin D. Denies any unwanted side effects. Rosacea has been stable on doxycycline a few times a week. Continues to follow with dermatology. Patient did have UA labs and EKG completed at orthopedics office. Reviewed with patient in office. Due to slight irregularity in EKG discussed with Dr. Roge Sweet who recommends cardiac clearance. Per patient no family history of heart disease. Is active.   No chest, palpitations or shortness of breath    Allergies   Allergen Reactions    Codeine Nausea And Vomiting    Hydrocodone Nausea And Vomiting    Other Nausea And Vomiting     \"certain types of anesthesia\" per pt       Past Medical History:   Diagnosis Date    Cyst of ovary 2008    LEFT    Fracture 2007    left pinky finger fx due to fall    H/O heartburn     TUMs as needed    Hx of blood clots 2012    RT LEG-DURRING VEIN STRIPPING    Lumbar radiculopathy     Rib fracture 03/02/2015    fell outside while walking dogs-LEFT    Rosacea 2009    CREAM AS NEEDED    Snores     Stress fracture of metatarsal bone of left foot 04/2020    2nd metatarsal    Wears contact lenses        Past Surgical History:   Procedure Laterality Date    BACK SURGERY Left 06/21/2017    microdiscectomy L4-5  DILATION AND CURETTAGE OF UTERUS  1980    DILATION AND CURETTAGE OF UTERUS N/A 4/10/2019    D & C HYSTEROSCOPY & polypectomy performed by Glenn Russell MD at [address] Left 6/21/2017    MICRODISCECTOMY L4-5, (PRONE), BRYANT TABLE, Yamilet Ascencio, C-ARM, MICROSCOPE performed by Mahnaz Castro MD at [address],[facility] [address] 1981    Removal of birth [place]. -RT AXILLIA    PA COLONOSCOPY FLX DX W/COLLJ SPEC WHEN PFRMD N/A 7/16/2018    COLONOSCOPY performed by Tan Doshi MD at [address] N/A 7/16/2018    EGD BIOPSY performed by Tan Doshi MD at [address] Bilateral 06/2020    , patient told from knee down was only 2 veins not three       Current Outpatient Medications on File Prior to Visit   Medication Sig Dispense Refill    vitamin D (CHOLECALCIFEROL) 25 MCG (1000 UT) TABS tablet Take 1,000 Units by mouth three times a week      Multiple Vitamin (MULTIVITAMIN ADULT PO) Take 1 tablet by mouth daily      Calcium 600-200 MG-UNIT TABS Calcium with Vitamin D. Take 1 tablet by mouth every day Except Sunday.  doxycycline hyclate (VIBRA-TABS) 100 MG tablet 2 times a week (Tues & Thurs)       No current facility-administered medications on file prior to visit. Social History     Socioeconomic History    Marital status:       Spouse name: Not on file    Number of children: Not on file    Years of education: Not on file    Highest education level: Not on file   Occupational History    Not on file   Tobacco Use    Smoking status: Never Smoker    Smokeless tobacco: Never Used   Vaping Use    Vaping Use: Never used   Substance and Sexual Activity    Alcohol use: No     Alcohol/week: 0.0 standard drinks    Drug use: No    Sexual activity: Never     Birth control/protection: Post-menopausal     Comment: Menarche age 16   Menopause age 49   Other Topics Concern    Not on file   Social History Narrative    Not on file     Social Determinants of Health     Financial Resource Strain:     Difficulty of Paying Living Expenses: Not on file   Food Insecurity:     Worried About Running Out of Food in the Last Year: Not on file    Mio of Food in the Last Year: Not on file   Transportation Needs:     Lack of Transportation (Medical): Not on file    Lack of Transportation (Non-Medical): Not on file   Physical Activity:     Days of Exercise per Week: Not on file    Minutes of Exercise per Session: Not on file   Stress:     Feeling of Stress : Not on file   Social Connections:     Frequency of Communication with Friends and Family: Not on file    Frequency of Social Gatherings with Friends and Family: Not on file    Attends Buddhist Services: Not on file    Active Member of [address] [place] [address] or Organizations: Not on file    Attends Club or Organization Meetings: Not on file    Marital Status: Not on file   Intimate Partner Violence:     Fear of Current or Ex-Partner: Not on file    Emotionally Abused: Not on file    Physically Abused: Not on file    Sexually Abused: Not on file   Housing Stability:     Unable to Pay for Housing in the Last Year: Not on file    Number of [place] in the Last Year: Not on file    Unstable Housing in the Last Year: Not on file       Review of Systems   Constitutional: Negative for activity change, appetite change, chills, fatigue, fever and unexpected weight change. HENT: Negative for congestion, dental problem, ear discharge, ear pain, facial swelling, hearing loss, postnasal drip, rhinorrhea, sinus pressure, sore throat and trouble swallowing. Eyes: Negative for pain and visual disturbance. Respiratory: Negative for cough, chest tightness, shortness of breath and wheezing. Cardiovascular: Negative for chest pain, palpitations and leg swelling. Gastrointestinal: Negative for abdominal pain, blood in stool, constipation, diarrhea, nausea and vomiting. Endocrine: Negative for cold intolerance, heat intolerance and polyuria. Genitourinary: Negative for difficulty urinating. Musculoskeletal: Positive for arthralgias. Negative for gait problem, myalgias, neck pain and neck stiffness. Skin: Negative for color change, rash and wound. Neurological: Negative for dizziness, tremors, seizures, weakness, light-headedness, numbness and headaches. Psychiatric/Behavioral: Negative for confusion and hallucinations. The patient is not nervous/anxious. Physical Exam  Vitals and nursing note reviewed. Constitutional:       General: She is not in acute distress. Appearance: Normal appearance. She is well-developed. She is not diaphoretic. HENT:      Head: Normocephalic and atraumatic. Right Ear: External ear normal.      Left Ear: External ear normal.   Eyes:      General:         Right eye: No discharge. Left eye: No discharge. Neck:      Trachea: No tracheal deviation. Cardiovascular:      Rate and Rhythm: Normal rate and regular rhythm. Heart sounds: Normal heart sounds. No murmur heard. No friction rub. No gallop. Pulmonary:      Effort: Pulmonary effort is normal. No respiratory distress. Breath sounds: Normal breath sounds. No stridor. No wheezing, rhonchi or rales. Chest:      Chest wall: No tenderness. Abdominal:      General: Bowel sounds are normal. There is no distension. Palpations: Abdomen is soft. Tenderness: There is no abdominal tenderness. Musculoskeletal:         General: No swelling. Skin:     General: Skin is warm and dry. Capillary Refill: Capillary refill takes less than 2 seconds. Coloration: Skin is not jaundiced or pale. Findings: No rash. Neurological:      General: No focal deficit present. Mental Status: She is alert and oriented to person, place, and time. Cranial Nerves: No cranial nerve deficit. Sensory: No sensory deficit.       Coordination: Coordination normal.   Psychiatric:         Mood and Affect: Mood normal.         Behavior: Behavior normal.         Thought Content: Thought content normal.         Judgment: Judgment normal.       Vitals:    01/05/22 1330   BP: 118/74   Site: Right Upper Arm   Position: Sitting   Cuff Size: Large Adult   Pulse: 76   Weight: 161 lb (73 kg)   Height: 5' 4.5\" (1.638 m)       Assessme:  1. Routine general medical examination at a health care facility  2. Gastroesophageal reflux disease without esophagitis  Refill omeprazole. Continue to work on diet, remain active  - omeprazole (PRILOSEC) 20 MG delayed release capsule; Take 1 capsule by mouth Daily  Dispense: 90 capsule; Refill: 3    3. Vitamin D deficiency  Stable on supplemental vitamin D.    4. Bilateral primary osteoarthritis of knee  Preop eval as noted below    5. Rosacea  Continues on doxycycline. Stable. Follows with dermatology in [place], Dr. Rafael Alanis    6. Hot flashes  Following with GYN    7. Pre-op chest exam   XR CHEST STANDARD (2 VW); Future    8. Localized osteoarthritis of right knee  9. Abnormal EKG  Labs stable today. EKG with abnormal findings. Dr. Talya Parr suggest referral to cardiology for cardiac clearance. UA negative. Will obtain a chest x-ray today. Plan:  As noted above. Follow up for routine visit. Call sooner with concerns prior.     Electronically signed by ELANA Moses CNP on 1/5/2022 at 2:28 PM

## 2022-01-05 NOTE — PROGRESS NOTES
Medicare Annual Wellness Visit  Name: Mahamed Lung Date: 2022   MRN: [id number] Sex: Female   Age: 80 y.o. Ethnicity: Non- / Non    : 1950 Race: White (non-)      Dustin Manzo is here for Medicare AWV and Pre-op Exam (Anaya Wang Scheduled 22)    Screenings for behavioral, psychosocial and functional/safety risks, and cognitive dysfunction are all negative except as indicated below. These results, as well as other patient data from the [facility] [address] form, are documented in Flowsheets linked to this Encounter. Allergies   Allergen Reactions    Codeine Nausea And Vomiting    Hydrocodone Nausea And Vomiting    Other Nausea And Vomiting     \"certain types of anesthesia\" per pt       Prior to Visit Medications    Medication Sig Taking? Authorizing Provider   vitamin D (CHOLECALCIFEROL) 25 MCG (1000 UT) TABS tablet Take 1,000 Units by mouth three times a week Yes Historical Provider, MD   Multiple Vitamin (MULTIVITAMIN ADULT PO) Take 1 tablet by mouth daily Yes Historical Provider, MD   Calcium 600-200 MG-UNIT TABS Calcium with Vitamin D. Take 1 tablet by mouth every day Except .  Yes Historical Provider, MD   omeprazole (PRILOSEC) 20 MG delayed release capsule Take 1 capsule by mouth Daily Yes ELANA Rhoades - CNP   doxycycline hyclate (VIBRA-TABS) 100 MG tablet 2 times a week (Tues & Thurs) Yes Historical Provider, MD       Past Medical History:   Diagnosis Date    Cyst of ovary 2008    LEFT    Fracture     left pinky finger fx due to fall    H/O heartburn     TUMs as needed    Hx of blood clots     RT LEG-DURRING VEIN STRIPPING    Lumbar radiculopathy     Rib fracture 2015    fell outside while walking dogs-LEFT    Rosacea 2009    CREAM AS NEEDED    Snores     Stress fracture of metatarsal bone of left foot 2020    2nd metatarsal    Wears contact lenses        Past Surgical History:   Procedure Laterality Date    BACK SURGERY Left 2017    microdiscectomy L4-5    DILATION AND CURETTAGE OF UTERUS      DILATION AND CURETTAGE OF UTERUS N/A 4/10/2019    D & C HYSTEROSCOPY & polypectomy performed by Isac Acevedo MD at [address] Left 2017    MICRODISCECTOMY L4-5, (PRONE), BRYANT TABLE, Tina Dyer, C-ARM, MICROSCOPE performed by Mary Adkins MD at [facility] 6 Right     Removal of birth [place]. -RT AXILLIA    MS COLONOSCOPY FLX DX W/COLLJ SPEC WHEN PFRMD N/A 2018    COLONOSCOPY performed by Grant Reese MD at [facility] ENDOSCOPY N/A 2018    EGD BIOPSY performed by Grant Reese MD at [address] Bilateral 2020    , patient told from knee down was only 2 veins not three       Family History   Problem Relation Age of Onset    Uterine Cancer Mother     Obesity Mother     Hypertension Mother     Diabetes Mother         NIDDM    Other Mother 80        Muscle disorder    Osteoporosis Mother     Other Brother 32         IN TRAIN VERSUS TRUCK ACCIDENT    Heart Disease Maternal Grandmother     Heart Disease Maternal Grandfather     Cirrhosis Maternal Grandfather     Breast Cancer Maternal Aunt     No Known Problems Paternal Aunt     No Known Problems Paternal Aunt     No Known Problems Paternal Aunt        CareTeam (Including outside providers/suppliers regularly involved in providing care):   Patient Care Team:  ELANA Grimaldo CNP as PCP - General (Nurse Practitioner)  ELANA Grimaldo CNP as PCP - [facility] Empaneled Provider    Wt Readings from Last 3 Encounters:   22 161 lb (73 kg)   10/07/21 161 lb 12.8 oz (73.4 kg)   21 167 lb (75.8 kg)     Vitals:    22 1330   BP: 118/74   Site: Right Upper Arm   Position: Sitting   Cuff Size: Large Adult   Pulse: 76   Weight: 161 lb (73 kg)   Height: 5' 4.5\" (1.638 m)     Body mass index is 27.21 kg/m². Patient's complete Health Risk Assessment and screening values have been reviewed and are found in Flowsheets. The following problems were reviewed today and where indicated follow up appointments were made and/or referrals ordered. Positive Risk Factor Screenings with Interventions:          General Health and ACP:  General  In general, how would you say your health is?: Good  In the past 7 days, have you experienced any of the following?  New or Increased Pain, New or Increased Fatigue, Loneliness, Social Isolation, Stress or Anger?: (!) New or Increased Pain  Do you get the social and emotional support that you need?: Yes  Do you have a Living Will?: Yes  Advance Directives     Power of  Living Will ACP-Advance Directive ACP-Power of     Not on File Not on File Not on File Not on File      General Health Risk Interventions:  · Pain issues: Pt is scheduled for an orthopedic surgery on 1/31/22 : Dre Olivares Habits/Nutrition:  Health Habits/Nutrition  Do you exercise for at least 20 minutes 2-3 times per week?: (!) No  Have you lost any weight without trying in the past 3 months?: No  Do you eat only one meal per day?: No  Have you seen the dentist within the past year?: Yes  Body mass index: (!) 27.21  Health Habits/Nutrition Interventions:  · Inadequate physical activity:  educational materials provided to promote increased physical activity     Safety:  Safety  Do you have working smoke detectors?: Yes  Have all throw rugs been removed or fastened?: (!) No  Do you have non-slip mats or surfaces in all bathtubs/showers?: Yes  Do all of your stairways have a railing or banister?: (!) No  Are your doorways, halls and stairs free of clutter?: Yes  Do you always fasten your seatbelt when you are in a car?: Yes  Safety Interventions:  · Home safety tips provided     Personalized Preventive Plan   Current Health Maintenance Status  Immunization History   Administered Date(s) Administered    COVID-19, Moderna, Booster, PF, 50mcg/0.25ml 10/27/2021    COVID-19, Moderna, Primary or Immunocompromised, PF, 100mcg/0.5mL 02/22/2021, 03/22/2021    Influenza Virus Vaccine 10/21/2012, 10/01/2013, 10/01/2014, 09/08/2015, 10/18/2016, 09/26/2017, 10/27/2018, 10/08/2021    Influenza, High Dose (Fluzone 65 yrs and older) 11/23/2019    Influenza, MDCK Quadv, IM, PF (Flucelvax 2 yrs and older) 10/25/2020    Influenza, MDCK Quadv, with preserv IM (Flucelvax 2 yrs and older) 10/25/2020    Influenza, Quadv, IM, PF (6 mo and older Fluzone, Flulaval, Fluarix, and 3 yrs and older Afluria) 10/18/2016    Influenza, Triv, inactivated, subunit, adjuvanted, IM (Fluad 65 yrs and older) 10/27/2018    Pneumococcal Conjugate 13-valent ([id number]) 06/08/2018, 06/20/2018    Pneumococcal Polysaccharide ([id number]) 06/05/2019    Td vaccine (adult) 04/05/2002    Tdap (Boostrix, Adacel) 05/18/2019    Zoster Live (Zostavax) 12/31/2014    Zoster Recombinant (Shingrix) 06/08/2018, 11/08/2018        Health Maintenance   Topic Date Due    Annual Wellness Visit (AWV)  Never done    Depression Screen  07/07/2022    Breast cancer screen  05/27/2023    Lipid screen  09/30/2026    Colon cancer screen colonoscopy  07/16/2028    DTaP/Tdap/Td vaccine (2 - Td or Tdap) 05/18/2029    DEXA (modify frequency per FRAX score)  Completed    Flu vaccine  Completed    Shingles Vaccine  Completed    Pneumococcal 65+ years Vaccine  Completed    COVID-19 Vaccine  Completed    Hepatitis C screen  Completed    Hepatitis A vaccine  Aged Out    Hepatitis B vaccine  Aged Out    Hib vaccine  Aged Out    Meningococcal (ACWY) vaccine  Aged Out     Recommendations for [employer] Due: see orders and patient instructions/AVS.  .   Recommended screening schedule for the next 5-10 years is provided to the patient in written form: see Patient Instructions/AVS.    Harlen Angelucci, LPN, 2/8/9474, performed the documented evaluation under the direct supervision of the attending physician.

## 2022-01-10 ENCOUNTER — OFFICE VISIT (OUTPATIENT)
Dept: CARDIOLOGY | Age: 72
End: 2022-01-10
Payer: MEDICARE

## 2022-01-10 VITALS
BODY MASS INDEX: 25.88 KG/M2 | WEIGHT: 161 LBS | DIASTOLIC BLOOD PRESSURE: 70 MMHG | HEIGHT: 66 IN | HEART RATE: 64 BPM | SYSTOLIC BLOOD PRESSURE: 165 MMHG

## 2022-01-10 DIAGNOSIS — R06.02 SOB (SHORTNESS OF BREATH): ICD-10-CM

## 2022-01-10 DIAGNOSIS — Z01.818 PRE-OP EVALUATION: ICD-10-CM

## 2022-01-10 DIAGNOSIS — R94.31 ABNORMAL EKG: Primary | ICD-10-CM

## 2022-01-10 PROCEDURE — 99204 OFFICE O/P NEW MOD 45 MIN: CPT | Performed by: INTERNAL MEDICINE

## 2022-01-10 PROCEDURE — 99213 OFFICE O/P EST LOW 20 MIN: CPT | Performed by: INTERNAL MEDICINE

## 2022-01-10 RX ORDER — IBUPROFEN 200 MG
400 TABLET ORAL EVERY 6 HOURS PRN
COMMUNITY

## 2022-01-10 RX ORDER — ACETAMINOPHEN 325 MG/1
650 TABLET ORAL EVERY 6 HOURS PRN
COMMUNITY
End: 2022-03-01

## 2022-01-10 NOTE — PROGRESS NOTES
Today's Date: 1/10/2022  Patient's Name: Darin Covington  Patient's age: 80 y. o., 1950    Subjective: The patient is a 80y.o. year old, , female is in the office for preop risk stratification for total knee replacement. She has mild symptoms of dyspnea on exertion occasionally with no orthopnea or PND. She margie any chest pain or discomfort. Margie any palpitation, dizziness or syncope. Past Medical History:   has a past medical history of Cyst of ovary, Fracture, H/O heartburn, Hx of blood clots, Lumbar radiculopathy, Rib fracture, Rosacea, Snores, Stress fracture of metatarsal bone of left foot, and Wears contact lenses. Past Surgical History:   has a past surgical history that includes Dilation and curettage of uterus (1980); other surgical history (Right, 1981); Tubal ligation (1982); back surgery (Left, 06/21/2017); laminectomy (Left, 6/21/2017); pr colonoscopy flx dx w/collj spec when pfrmd (N/A, 7/16/2018); Upper gastrointestinal endoscopy (N/A, 7/16/2018); Dilation and curettage of uterus (N/A, 4/10/2019); and Vein Surgery (Bilateral, 06/2020). Home Medications:  Prior to Admission medications    Medication Sig Start Date End Date Taking? Authorizing Provider   acetaminophen (TYLENOL) 325 MG tablet Take 650 mg by mouth every 6 hours as needed for Pain   Yes Historical Provider, MD   ibuprofen (ADVIL;MOTRIN) 200 MG tablet Take 400 mg by mouth every 6 hours as needed for Pain   Yes Historical Provider, MD   omeprazole (PRILOSEC) 20 MG delayed release capsule Take 1 capsule by mouth Daily 1/5/22  Yes ELANA Flannery - CNP   vitamin D (CHOLECALCIFEROL) 25 MCG (1000 UT) TABS tablet Take 1,000 Units by mouth three times a week   Yes Historical Provider, MD   Multiple Vitamin (MULTIVITAMIN ADULT PO) Take 1 tablet by mouth daily   Yes Historical Provider, MD   Calcium 600-200 MG-UNIT TABS Calcium with Vitamin D. Take 1 tablet by mouth every day Except Sunday.    Yes Historical Provider, MD   doxycycline hyclate (VIBRA-TABS) 100 MG tablet 2 times a week (Tues & Thurs) 2/26/18  Yes Historical Provider, MD       Allergies:  Codeine, Hydrocodone, and Other    Social History:   reports that she has never smoked. She has never used smokeless tobacco. She reports that she does not drink alcohol and does not use drugs. Review of Systems:  · Constitutional: there has been no unanticipated weight loss. There's been No change in energy level, No change in activity level. · Eyes: No visual changes or diplopia. No scleral icterus. · ENT: No Headaches, hearing loss or vertigo. No mouth sores or sore throat. · Cardiovascular: As above. · Respiratory: No SOB, cough or hemoptysis. · Gastrointestinal: No abdominal pain, appetite loss, blood in stools. No change in bowel or bladder habits. · Genitourinary: No dysuria, trouble voiding, or hematuria. · Musculoskeletal:  No gait disturbance, No weakness or joint complaints. · Integumentary: No rash or pruritis. · Neurological: No headache, diplopia, change in muscle strength, numbness or tingling. No change in gait, balance, coordination, mood, affect, memory, mentation, behavior. · Psychiatric: No anxiety, or depression. · Endocrine: No temperature intolerance. No excessive thirst, fluid intake, or urination. No tremor. · Hematologic/Lymphatic: No abnormal bruising or bleeding, blood clots or swollen lymph nodes. · Allergic/Immunologic: No nasal congestion or hives. Physical Exam:  BP (!) 165/70 (Site: Right Upper Arm, Position: Sitting, Cuff Size: Medium Adult)   Pulse 64   Ht 5' 5.5\" (1.664 m)   Wt 161 lb (73 kg)   LMP 06/20/2007 (Approximate)   BMI 26.38 kg/m²   Constitutional and General Appearance: alert, cooperative, no distress and appears stated age  [de-identified]: PERRL, no cervical lymphadenopathy. No masses palpable.  Normal oral mucosa  Respiratory:  · Normal excursion and expansion without use of accessory muscles  · Resp Auscultation: Good respiratory effort. No for increased work of breathing. On auscultation: clear to auscultation bilaterally  Cardiovascular:  · The apical impulse is not displaced  · Heart tones are crisp and normal. regular S1 and S2.  · Jugular venous pulsation Normal  · The carotid upstroke is normal in amplitude and contour without delay or bruit  · Peripheral pulses are symmetrical and full   Abdomen:   · No masses or tenderness  · Bowel sounds present  Extremities:  ·  No Cyanosis or Clubbing  ·  Lower extremity edema: No  ·  Skin: Warm and dry  Neurological:  · Alert and oriented. · Moves all extremities well  · No abnormalities of mood, affect, memory, mentation, or behavior are noted    Cardiac Data:  Diagnostics:    EKG: normal sinus rhythm, Incomplete LBBB. Labs:     FASTING LIPID PANEL:  Lab Results   Component Value Date    HDL 52 09/30/2021    LDLCALC 89 10/11/2019    TRIG 142 09/30/2021         IMPRESSION:    Patient Active Problem List   Diagnosis    Herniated nucleus pulposus, L4-5         Assessment / Acute Cardiac Problems:     1. Preop risk stratification for total knee replacement. 2.  Abnormal EKG with incomplete left bundle branch  3.   Mild shortness of breath      Plan of Treatment:     1-Due to abnormal EKG and symptoms of shortness of breath will obtain 2D echocardiogram to rule out any underlying cardiomyopathy and for preop risk stratification  2-if echo is unremarkable can be cleared for surgery as planned  3-F/U on an annual basis        Electronically signed by Rodolfo Cohn MD on 1/10/2022 at 9:51 [facility]  [phone]

## 2022-01-12 ENCOUNTER — TELEPHONE (OUTPATIENT)
Dept: CARDIOLOGY | Age: 72
End: 2022-01-12

## 2022-01-12 ENCOUNTER — HOSPITAL ENCOUNTER (OUTPATIENT)
Dept: NON INVASIVE DIAGNOSTICS | Age: 72
Discharge: HOME | End: 2022-01-12
Payer: MEDICARE

## 2022-01-12 DIAGNOSIS — Z01.818 PRE-OP EVALUATION: ICD-10-CM

## 2022-01-12 DIAGNOSIS — R94.31 ABNORMAL EKG: ICD-10-CM

## 2022-01-12 DIAGNOSIS — R06.02 SOB (SHORTNESS OF BREATH): ICD-10-CM

## 2022-01-12 LAB
LV EF: 48 %
LVEF MODALITY: NORMAL

## 2022-01-12 PROCEDURE — 93306 TTE W/DOPPLER COMPLETE: CPT

## 2022-01-13 NOTE — TELEPHONE ENCOUNTER
Routed echo and Dr. Elizondo Click note to Dr. Amber Rivera fax. Pt notified of echo results and clearance given.

## 2022-03-01 ENCOUNTER — OFFICE VISIT (OUTPATIENT)
Dept: INTERNAL MEDICINE | Age: 72
End: 2022-03-01
Payer: MEDICARE

## 2022-03-01 VITALS
BODY MASS INDEX: 25.3 KG/M2 | DIASTOLIC BLOOD PRESSURE: 70 MMHG | HEART RATE: 80 BPM | SYSTOLIC BLOOD PRESSURE: 122 MMHG | HEIGHT: 66 IN | WEIGHT: 157.4 LBS

## 2022-03-01 DIAGNOSIS — M17.11 LOCALIZED OSTEOARTHRITIS OF RIGHT KNEE: Primary | ICD-10-CM

## 2022-03-01 DIAGNOSIS — K21.9 GASTROESOPHAGEAL REFLUX DISEASE WITHOUT ESOPHAGITIS: ICD-10-CM

## 2022-03-01 DIAGNOSIS — E55.9 VITAMIN D DEFICIENCY: ICD-10-CM

## 2022-03-01 DIAGNOSIS — L71.9 ROSACEA: ICD-10-CM

## 2022-03-01 PROCEDURE — 99213 OFFICE O/P EST LOW 20 MIN: CPT | Performed by: NURSE PRACTITIONER

## 2022-03-01 PROCEDURE — 99214 OFFICE O/P EST MOD 30 MIN: CPT | Performed by: NURSE PRACTITIONER

## 2022-03-01 RX ORDER — OXYCODONE HYDROCHLORIDE AND ACETAMINOPHEN 5; 325 MG/1; MG/1
1 TABLET ORAL PRN
COMMUNITY

## 2022-03-01 RX ORDER — ACETAMINOPHEN 500 MG
TABLET ORAL
COMMUNITY

## 2022-03-02 ASSESSMENT — ENCOUNTER SYMPTOMS
CHEST TIGHTNESS: 0
SHORTNESS OF BREATH: 0
VOMITING: 0
CONSTIPATION: 0
NAUSEA: 0
DIARRHEA: 0
COUGH: 0
ABDOMINAL PAIN: 0
TROUBLE SWALLOWING: 0
WHEEZING: 0
COLOR CHANGE: 0
SORE THROAT: 0
FACIAL SWELLING: 0
SINUS PRESSURE: 0
RHINORRHEA: 0
EYE PAIN: 0
BLOOD IN STOOL: 0

## 2022-03-31 ENCOUNTER — TELEPHONE (OUTPATIENT)
Dept: OBGYN | Age: 72
End: 2022-03-31

## 2022-03-31 NOTE — TELEPHONE ENCOUNTER
Left message for patient to call office  Can be scheduled with paulino arteaga normal   Due after 6/2

## 2022-06-15 DIAGNOSIS — Z12.31 ENCOUNTER FOR SCREENING MAMMOGRAM FOR MALIGNANT NEOPLASM OF BREAST: Primary | ICD-10-CM

## 2022-06-16 ENCOUNTER — OFFICE VISIT (OUTPATIENT)
Dept: OBGYN | Age: 72
End: 2022-06-16
Payer: MEDICARE

## 2022-06-16 ENCOUNTER — HOSPITAL ENCOUNTER (OUTPATIENT)
Dept: MAMMOGRAPHY | Age: 72
Discharge: HOME | End: 2022-06-18
Payer: MEDICARE

## 2022-06-16 VITALS
OXYGEN SATURATION: 98 % | BODY MASS INDEX: 26.79 KG/M2 | HEIGHT: 65 IN | WEIGHT: 160.8 LBS | DIASTOLIC BLOOD PRESSURE: 80 MMHG | HEART RATE: 77 BPM | SYSTOLIC BLOOD PRESSURE: 130 MMHG

## 2022-06-16 VITALS — HEIGHT: 65 IN | BODY MASS INDEX: 26.66 KG/M2 | WEIGHT: 160 LBS

## 2022-06-16 DIAGNOSIS — Z01.419 WELL WOMAN EXAM WITH ROUTINE GYNECOLOGICAL EXAM: Primary | ICD-10-CM

## 2022-06-16 DIAGNOSIS — Z12.31 ENCOUNTER FOR SCREENING MAMMOGRAM FOR MALIGNANT NEOPLASM OF BREAST: ICD-10-CM

## 2022-06-16 DIAGNOSIS — I50.22 CHRONIC SYSTOLIC (CONGESTIVE) HEART FAILURE (HCC): ICD-10-CM

## 2022-06-16 PROCEDURE — G0439 PPPS, SUBSEQ VISIT: HCPCS

## 2022-06-16 PROCEDURE — 99397 PER PM REEVAL EST PAT 65+ YR: CPT

## 2022-06-16 PROCEDURE — 1123F ACP DISCUSS/DSCN MKR DOCD: CPT | Performed by: NURSE PRACTITIONER

## 2022-06-16 PROCEDURE — 77063 BREAST TOMOSYNTHESIS BI: CPT

## 2022-06-16 PROCEDURE — 99213 OFFICE O/P EST LOW 20 MIN: CPT | Performed by: NURSE PRACTITIONER

## 2022-06-16 ASSESSMENT — PATIENT HEALTH QUESTIONNAIRE - PHQ9
SUM OF ALL RESPONSES TO PHQ9 QUESTIONS 1 & 2: 0
SUM OF ALL RESPONSES TO PHQ QUESTIONS 1-9: 0
SUM OF ALL RESPONSES TO PHQ QUESTIONS 1-9: 0
2. FEELING DOWN, DEPRESSED OR HOPELESS: 0
SUM OF ALL RESPONSES TO PHQ QUESTIONS 1-9: 0
1. LITTLE INTEREST OR PLEASURE IN DOING THINGS: 0
SUM OF ALL RESPONSES TO PHQ QUESTIONS 1-9: 0

## 2022-06-16 ASSESSMENT — ENCOUNTER SYMPTOMS
EYES NEGATIVE: 1
ALLERGIC/IMMUNOLOGIC NEGATIVE: 1
RESPIRATORY NEGATIVE: 1
GASTROINTESTINAL NEGATIVE: 1

## 2022-06-16 NOTE — PROGRESS NOTES
Subjective:      Patient ID: Hernan Harvey  is a 80 y.o. Eudelia Latch  ,female coming into office regarding   Chief Complaint   Patient presents with    Annual Exam       OB History    Para Term  AB Living   3 3 2     2   SAB IAB Ectopic Molar Multiple Live Births                    # Outcome Date GA Lbr Caesar/2nd Weight Sex Delivery Anes PTL Lv   3 Para     F       2 Term     F Vag-Spont      1 Term     M Vag-Spont          This is a [de-identified] y/o [id number]   female here for her annual gyn examination. She has not been sexually active since her   13 years ago. All of her pap smearsa have been normal. Yeats ago she had a BTL. Couple months ago she had a right knee replacement and doing well. She denies vaginal bleeding, discharge urinar or rectal incontinence. She did have a DVT several years ago post vein stripping procedure, but none since. She also had a D & C on 4/10/19 for a uterine polyp. Fam. Hx: mom with uterine cancer (maybe ovarian cancer years prior to that; pt. Unsure. Mat. Aunt with breast cancer. Current on all vaccines: shingles, pneumonia, flu and covid    Past Medical History:   Diagnosis Date    Cyst of ovary 2008    LEFT    Deep vein thrombosis ([address]     Fracture 2007    left pinky finger fx due to fall    H/O heartburn     TUMs as needed    Hx of blood clots     RT LEG-DURRING VEIN STRIPPING    Lumbar radiculopathy     Rib fracture 2015    fell outside while walking dogs-LEFT    Rosacea 2009    CREAM AS NEEDED    Snores     Stress fracture of metatarsal bone of left foot 2020    2nd metatarsal    Wears contact lenses      Review of Systems   Constitutional: Negative. HENT: Negative. Eyes: Negative. Respiratory: Negative. Cardiovascular: Negative. Gastrointestinal: Negative. Endocrine: Negative. Genitourinary: Negative. Musculoskeletal: Negative. Skin: Negative.     Allergic/Immunologic: Negative. Neurological: Negative. Hematological: Negative. Psychiatric/Behavioral: Negative. Objective:     Vitals:    06/16/22 0900   BP: 130/80   Site: Left Upper Arm   Position: Sitting   Cuff Size: Large Adult   Pulse: 77   SpO2: 98%   Weight: 160 lb 12.8 oz (72.9 kg)   Height: 5' 5\" (1.651 m)      Physical Exam  Vitals and nursing note reviewed. Constitutional:       Appearance: Normal appearance. HENT:      Head: Normocephalic. Cardiovascular:      Rate and Rhythm: Normal rate and regular rhythm. Pulses: Normal pulses. Heart sounds: Normal heart sounds. Pulmonary:      Effort: Pulmonary effort is normal.      Breath sounds: Normal breath sounds. Abdominal:      General: Abdomen is flat. Palpations: Abdomen is soft. Musculoskeletal:         General: Normal range of motion. Cervical back: Normal range of motion and neck supple. Comments: Recent right knee replacement   Skin:     General: Skin is warm and dry. Neurological:      Mental Status: She is alert. Psychiatric:         Mood and Affect: Mood normal.       Inspection negative. No nipple discharge or bleeding. No palpable mass    Vulva:normal no lesions  Vagina pink rugae  Cervix palpated only, no abnormal lesions, no CMT  Uterus: small, mobile, NT  Ovaries: NT, small    Sexually Active:No    Any bleeding or pain with intercourse: N/A    Last Sexual Encounter:not sexually active for 15 years   Protected or Unprotected: BTL  Last Pap: 6/2/21 neg. Last HPV:neg    High Risk HPV: neg    Last Mammogram: 5/27/21 neg. Scheduled for today    Last Dexascan: 6/16/20; spine +0.1; left hip -0.1; fem neck -0.1; repeat in 2025    Last Colonoscopy 7/16/18 neg. Repeat in 10 years    Do you do self breast exams: Yes      Assessment:   1. Normal gyn exam  2. Postmenopausal  3. Mom with uterine cancer, and possible ovarian cancet; mat. Aunt with breast cancer        Plan:   1. No pap smear obtained.  Pt. Need no more pap smears unless a new sexual partner  2. Mammogram today  3. Repeat DEXZ in 2025  4. I spent 30 min. With pt. Face to face discussing medical status. 5. RTO 1 year prn    No follow-ups on file. No orders of the defined types were placed in this encounter.       Electronically signed by:  ELANA Beatty CNP

## 2022-12-16 ENCOUNTER — OFFICE VISIT (OUTPATIENT)
Dept: CARDIOLOGY | Age: 72
End: 2022-12-16
Payer: MEDICARE

## 2022-12-16 VITALS
HEART RATE: 79 BPM | BODY MASS INDEX: 23.76 KG/M2 | DIASTOLIC BLOOD PRESSURE: 70 MMHG | SYSTOLIC BLOOD PRESSURE: 122 MMHG | HEIGHT: 65 IN | WEIGHT: 142.6 LBS

## 2022-12-16 DIAGNOSIS — I50.22 CHRONIC SYSTOLIC (CONGESTIVE) HEART FAILURE (HCC): ICD-10-CM

## 2022-12-16 DIAGNOSIS — I10 ESSENTIAL HYPERTENSION: ICD-10-CM

## 2022-12-16 DIAGNOSIS — R94.31 ABNORMAL EKG: Primary | ICD-10-CM

## 2022-12-16 PROCEDURE — 3074F SYST BP LT 130 MM HG: CPT | Performed by: SURGERY

## 2022-12-16 PROCEDURE — 1123F ACP DISCUSS/DSCN MKR DOCD: CPT | Performed by: SURGERY

## 2022-12-16 PROCEDURE — 93005 ELECTROCARDIOGRAM TRACING: CPT | Performed by: SURGERY

## 2022-12-16 PROCEDURE — 99213 OFFICE O/P EST LOW 20 MIN: CPT | Performed by: SURGERY

## 2022-12-16 PROCEDURE — 93010 ELECTROCARDIOGRAM REPORT: CPT | Performed by: SURGERY

## 2022-12-16 PROCEDURE — 3078F DIAST BP <80 MM HG: CPT | Performed by: SURGERY

## 2022-12-16 PROCEDURE — 99214 OFFICE O/P EST MOD 30 MIN: CPT | Performed by: SURGERY

## 2022-12-16 NOTE — PROGRESS NOTES
Today's Date: 2022  Patient's Name: Javed Godoy  Patient's age: 71 y. o., 1950    Subjective: The patient is a 71y.o. year old,  female is in the office for follow-up does not know why she is here- denies  any chest pain or discomfort. No orthopnea or PND. Nicole any palpitation, dizziness or syncope. She  is completely asymptomatic from cardiac stand point. States that her blood pressure fluctuate up and down and she is able to feel it when it is high usually with a headache   Family History:  Family History   Problem Relation Age of Onset    Uterine Cancer Mother     Obesity Mother     Hypertension Mother     Diabetes Mother         NIDDM    Other Mother 80        Muscle disorder    Osteoporosis Mother     Other Brother 32         IN TRAIN VERSUS TRUCK ACCIDENT    Heart Disease Maternal Grandmother     Heart Disease Maternal Grandfather     Cirrhosis Maternal Grandfather     Breast Cancer Maternal Aunt     No Known Problems Paternal Aunt     No Known Problems Paternal Aunt     No Known Problems Paternal Aunt          Past Medical History:   has a past medical history of Cyst of ovary, Deep vein thrombosis ([address] Fracture, H/O heartburn, Hx of blood clots, Lumbar radiculopathy, Rib fracture, Rosacea, Snores, Stress fracture of metatarsal bone of left foot, and Wears contact lenses. Past Surgical History:   has a past surgical history that includes Dilation and curettage of uterus (); other surgical history (Right, ); Tubal ligation (); back surgery (Left, 2017); laminectomy (Left, 2017); pr colonoscopy flx dx w/collj spec when pfrmd (N/A, 2018); Upper gastrointestinal endoscopy (N/A, 2018); Dilation and curettage of uterus (N/A, 04/10/2019); Vein Surgery (Bilateral, 2020); Total knee arthroplasty (Right, 2022); and Dilation & curettage. Home Medications:  Prior to Admission medications    Medication Sig Start Date End Date Taking? Authorizing Provider   acetaminophen (TYLENOL) 500 MG tablet Take 1-2 tabs by mouth nightly prn   Yes Historical Provider, MD   ibuprofen (ADVIL;MOTRIN) 200 MG tablet Take 400 mg by mouth every 6 hours as needed for Pain   Yes Historical Provider, MD   omeprazole (PRILOSEC) 20 MG delayed release capsule Take 1 capsule by mouth Daily 1/5/22  Yes Laura Pires, APRN - CNP   vitamin D (CHOLECALCIFEROL) 25 MCG (1000 UT) TABS tablet Take 1,000 Units by mouth three times a week   Yes Historical Provider, MD   Multiple Vitamin (MULTIVITAMIN ADULT PO) Take 1 tablet by mouth daily   Yes Historical Provider, MD   Calcium 600-200 MG-UNIT TABS Calcium with Vitamin D. Take 1 tablet by mouth every day Except Sunday. Yes Historical Provider, MD   doxycycline hyclate (VIBRA-TABS) 100 MG tablet 2 times a week (Tues & Thurs) 2/26/18  Yes Historical Provider, MD       Allergies:  Codeine, Hydrocodone, and Other    Social History:   reports that she has never smoked. She has never used smokeless tobacco. She reports that she does not drink alcohol and does not use drugs. Review of Systems:  Constitutional: there has been no unanticipated weight loss. There's been No change in energy level, No change in activity level. Eyes: No visual changes or diplopia. No scleral icterus. ENT: No Headaches, hearing loss or vertigo. No mouth sores or sore throat. Cardiovascular: As above. Respiratory: No SOB, cough or hemoptysis. Gastrointestinal: No abdominal pain, appetite loss, blood in stools. No change in bowel or bladder habits. Genitourinary: No dysuria, trouble voiding, or hematuria. Musculoskeletal:  No gait disturbance, No weakness or joint complaints. Integumentary: No rash or pruritis. Neurological: No headache, diplopia, change in muscle strength, numbness or tingling. No change in gait, balance, coordination, mood, affect, memory, mentation, behavior. Psychiatric: No anxiety, or depression.   Endocrine: No temperature intolerance. No excessive thirst, fluid intake, or urination. No tremor. Hematologic/Lymphatic: No abnormal bruising or bleeding, blood clots or swollen lymph nodes. Allergic/Immunologic: No nasal congestion or hives. Physical Exam:  /70   Pulse 79   Ht 5' 5\" (1.651 m)   Wt 142 lb 9.6 oz (64.7 kg)   LMP 06/20/2007 (Approximate)   BMI 23.73 kg/m²   Constitutional and General Appearance: alert, cooperative, no distress and appears stated age  [de-identified]: PERRL, no cervical lymphadenopathy. No masses palpable. Normal oral mucosa  Respiratory:  Normal excursion and expansion without use of accessory muscles  Resp Auscultation: Good respiratory effort. No for increased work of breathing. On auscultation: clear to auscultation bilaterally  Cardiovascular: The apical impulse is not displaced  Heart tones are crisp and normal. regular S1 and S2.  Jugular venous pulsation Normal  The carotid upstroke is normal in amplitude and contour without delay or bruit  Peripheral pulses are symmetrical and full   Abdomen:   No masses or tenderness  Bowel sounds present  Extremities:   No Cyanosis or Clubbing   Lower extremity edema: No   Skin: Warm and dry  Neurological:  Alert and oriented. Moves all extremities well  No abnormalities of mood, affect, memory, mentation, or behavior are noted     Cardiac Data:  EKG: Sinus  Rhythm   -Poor R-wave progression -nonspecific -consider old anterior infarct.    -Nonspecific ST depression   +   Nonspecific T-abnormality  -Nondiagnostic. ABNORMAL       ECHO Summary  Normal left ventricular diameter. Left ventricular systolic function is low normal.  Left ventricular ejection fraction 45-50 %. Grade I (mild) left ventricular diastolic dysfunction. No significant valvular regurgitation or stenosis seen. No pericardial effusion.      Signature  ----------------------------------------------------------------------------   Electronically signed by Demetrius Robles RD(Sonographer) on 01/12/2022   10:23 AM  ----------------------------------------------------------------------------  Labs:     CBC: No results for input(s): WBC, HGB, HCT, PLT in the last 72 hours. BMP: No results for input(s): NA, K, CO2, BUN, CREATININE, LABGLOM, GLUCOSE in the last 72 hours. PT/INR: No results for input(s): PROTIME, INR in the last 72 hours. FASTING LIPID PANEL:  Lab Results   Component Value Date/Time    HDL 52 09/30/2021 07:34 AM    LDLCALC 89 10/11/2019 12:00 AM    TRIG 142 09/30/2021 07:34 AM     LIVER PROFILE:No results for input(s): AST, ALT, LABALBU in the last 72 hours.     IMPRESSION:    Patient Active Problem List   Diagnosis    Herniated nucleus pulposus, L4-5    Chronic systolic (congestive) heart failure       RECOMMENDATIONS:  Abnormal EKG-denies chest pain or shortness of breath but never had stress test we will order Lexiscan stress test to rule out ischemia  Borderline hypertension-blood pressure stable in the office continue to monitor blood pressure at home , low-salt diet  Follow-up with stress results        ELANA Garcia - NP, MD  [facility]           [phone]

## 2022-12-22 ENCOUNTER — HOSPITAL ENCOUNTER (OUTPATIENT)
Dept: NUCLEAR MEDICINE | Age: 72
Discharge: HOME | End: 2022-12-24
Payer: MEDICARE

## 2022-12-22 ENCOUNTER — HOSPITAL ENCOUNTER (OUTPATIENT)
Dept: NON INVASIVE DIAGNOSTICS | Age: 72
Discharge: HOME | End: 2022-12-22
Payer: MEDICARE

## 2022-12-22 ENCOUNTER — TELEPHONE (OUTPATIENT)
Dept: CARDIOLOGY | Age: 72
End: 2022-12-22

## 2022-12-22 DIAGNOSIS — R94.39 ABNORMAL CARDIOVASCULAR STRESS TEST: Primary | ICD-10-CM

## 2022-12-22 DIAGNOSIS — R94.31 ABNORMAL EKG: ICD-10-CM

## 2022-12-22 PROCEDURE — A9500 TC99M SESTAMIBI: HCPCS | Performed by: SURGERY

## 2022-12-22 PROCEDURE — 3430000000 HC RX DIAGNOSTIC RADIOPHARMACEUTICAL: Performed by: SURGERY

## 2022-12-22 PROCEDURE — 78452 HT MUSCLE IMAGE SPECT MULT: CPT

## 2022-12-22 PROCEDURE — 6360000002 HC RX W HCPCS: Performed by: INTERNAL MEDICINE

## 2022-12-22 PROCEDURE — 93017 CV STRESS TEST TRACING ONLY: CPT

## 2022-12-22 RX ORDER — TECHNETIUM TC-99M SESTAMIBI 1 MG/10ML
30 INJECTION INTRAVENOUS
Status: COMPLETED | OUTPATIENT
Start: 2022-12-22 | End: 2022-12-22

## 2022-12-22 RX ORDER — TECHNETIUM TC-99M SESTAMIBI 1 MG/10ML
10 INJECTION INTRAVENOUS
Status: COMPLETED | OUTPATIENT
Start: 2022-12-22 | End: 2022-12-22

## 2022-12-22 RX ADMIN — Medication 10 MILLICURIE: at 10:30

## 2022-12-22 RX ADMIN — REGADENOSON 0.4 MG: 0.08 INJECTION, SOLUTION INTRAVENOUS at 09:28

## 2022-12-22 RX ADMIN — Medication 30 MILLICURIE: at 10:30

## 2022-12-22 NOTE — TELEPHONE ENCOUNTER
Please review Abnormal stress, preliminary scanned to order, and advise. I spoke to pt. She denies symptoms. She is aware to use ER for any reason in the mean time.

## 2022-12-22 NOTE — PROGRESS NOTES
Patient Name:  Albert Spaulding MRN:  [id number]   :  1950  Age:  71 y.o. Sex: female     Ordering Provider: ELANA Escobar NP    Primary Care Provider:  ELANA East CNP     Indications: abnormal ekg     Medications:     Current Outpatient Medications:     acetaminophen (TYLENOL) 500 MG tablet, Take 1-2 tabs by mouth nightly prn, Disp: , Rfl:     ibuprofen (ADVIL;MOTRIN) 200 MG tablet, Take 400 mg by mouth every 6 hours as needed for Pain, Disp: , Rfl:     omeprazole (PRILOSEC) 20 MG delayed release capsule, Take 1 capsule by mouth Daily, Disp: 90 capsule, Rfl: 3    vitamin D (CHOLECALCIFEROL) 25 MCG (1000 UT) TABS tablet, Take 1,000 Units by mouth three times a week, Disp: , Rfl:     Multiple Vitamin (MULTIVITAMIN ADULT PO), Take 1 tablet by mouth daily, Disp: , Rfl:     Calcium 600-200 MG-UNIT TABS, Calcium with Vitamin D. Take 1 tablet by mouth every day Except . , Disp: , Rfl:     doxycycline hyclate (VIBRA-TABS) 100 MG tablet, 2 times a week (Tues & Thurs), Disp: , Rfl:     Current Facility-Administered Medications:     regadenoson (LEXISCAN) injection 0.4 mg, 0.4 mg, IntraVENous, Once, Jaky Pearce MD    Facility-Administered Medications Ordered in Other Encounters:     technetium sestamibi (CARDIOLITE) injection 30 millicurie, 30 millicurie, IntraVENous, ONCE PRN, ELANA Delong NP    technetium sestamibi (CARDIOLITE) injection 10 millicurie, 10 millicurie, IntraVENous, ONCE PRN, ELANA Delong NP      ----------------------------------------------------------------------------------------------------------                Lexiscan 0.4 mg injected over 10 seconds. IV Cardiolite injected 20 seconds post Lexiscan injection.      Heart Rate:  60  Resting Blood Pressure:  163/74   ----------------------------------------------------------------------------------------------------------     HR BP   1 min 100 144/68   2 min     3 min 87 154/72   4 min     5 min 85 150/69   6 min     7 min 76 144/67   8 min     9 min 81 145/67   10 min       Symptoms:  Chest Pain:  No      Nausea:  Yes     Headache:  No    Shortness of Breath:  No     Other:  No        Electronically signed by Lsia Gaona RCP on 12/22/22 at 9:22 AM EST    ----------------------------------------------------------------------------------------------------------  Resting EKG:  NSR nonspecific interventricular conduction defect    Arrhythmias: None    EKG Changes: No changes    Maximum Changes:       Leads with maximum changes:         EKG returned to baseline  minutes in recovery.      Interpretation:      Stress EKG inconclusive for ischemia due to abnormal baseline EKG    Nuclear scan to follow up      Supervising Physician:  Dr. Salvador Goins

## 2022-12-22 NOTE — PROCEDURES
[facility]                 [address] [place] [id number]                              CARDIAC STRESS TEST    PATIENT NAME: Sindhu Patterson                   :        1950  MED REC NO:   [id number]                             ROOM:  ACCOUNT NO:   [de-identified]                           ADMIT DATE: 2022  PROVIDER:     Pretty Reid    DATE OF STUDY:  2022    STRESS TEST    Ordering Provider:  August Davis. AME Rosa    Primary Care Provider:  AME Calderón    Patient's Age:  71       Height:  5 feet 5 inches      Weight:  142  pounds    INDICATION:  Abnormal EKG. Lexiscan 0.4 mg injected over 10 seconds. IV Cardiolite injected 20 seconds post Lexiscan injection. Heart Rate:  60     Resting blood pressure:  163/74              HR   BP  1 min          100  144/68  2 min  3 min          87   154/72  4 min  5 min          85   150/69  6 min  7 min          76   144/67  8 min  9 min          81   145/67  10 min    Symptoms:  Chest Pain:  No  Nausea:  Yes  Headache:  No  Shortness of  breath:  No  Other:  No    Resting EKG:  Normal sinus rhythm, nonspecific interventricular  conduction defect. Arrhythmias:  None. EKG changes:  No changes. EKG returned to baseline minutes in recovery. INTERPRETATION:  1. Stress EKG inconclusive for ischemia due to abnormal baseline EKG. 2.  Nuclear scan to follow up.     Nuclear Myocardial Perfusion Imaging (SPECT)    TESTING METHOD  STRESS:   Lexiscan  AGENT:    Cardiolite  REST:          Injection Date:  2022  Time:  824  Amount:  9.95  mCi  STRESS:   Injection Date:  2022  Time:  928  Amount:  30.4 mCi  IMAGE TIME:    Rest:  0900  Stress:  1000    EF:  49%  TID:  1.07  LHR:  0.52    FINDINGS:  Ischemia (Reversible Defect):           Yes, moderate anterior wall  ischemia  Infarction (Irreversible Defect):       No  Normal Ejection Fraction: No    IMPRESSION:  1. Moderate anterior wall ischemia. 2.  LVEF 49%. Juan J Lee    D: 12/22/2022 13:49:02       T: 12/22/2022 13:50:33     PACHECO/LINDY_EDIT  Job#: [id number]     Doc#: Unknown    CC:  Linda Rosa, ELANA-CNP

## 2022-12-27 NOTE — TELEPHONE ENCOUNTER
The following orders will be implemented. Orders with a [] are choices and are NOT implemented unless the box is checked. Pre-Procedure Orders    Joseluis Harper     1950     Diagnosis:      Procedure scheduled for:   Procedure ordered was discussed with the patient  including risks, benefits and alternative therapies. [] Left Heart Catheterization and coronary angiography    [x] Left Heart Catheterization and coronary angiography w/possible PCI/Coronary Stent PCI    [] Right Heart Catheterization  [] With Flolan  [] Aortic Root    [] JAEL  [] Cardioversion    [] EPS  [] Tilt Table Study per guidelines  [] With Isuprel  [] With NTG    [] Without medication    [] ILR[]    ICD: [] Replacement  [] New implant  [] Lead Extraction    Permanent Pacemaker:  [] Replacement  [] New Implant    [] Lead Extraction    Irrigation:  [] Vancomycin 1 gram ( Check [place] for PPM/ICD)    Ablation:   [] CRYO  [] SVT  [] A-Fib  [] A-Flutter  [] PVC  [] VT    [] With Anesthesia  [] W/O Anesthesia [] With KYLE  [] W/O KYLE     [] With CARTO  [] W/O CARTO    ORDERS    [] EKG   [] Point of care testing  [] Urine Pregnancy  [] PLT    [] PT/INR if on Coumadin  [] NA, K, Glucose, CL, Creat, Calc, HBG/HCT    IV:  [x] Start Peripheral IV: [x] N/S at   100  ml/hr. [] D5 1/2 N/S at     ml/hr.      Patient has history of contrast reaction; give below meds as prophylaxis 30-60 min prior to procedure:    [] Benadryl (diphenhydramine) 25 mg IV x 1 dose    [] Benadryl (diphenhydramine) 50 mg IV x 1 dose    [] Solu-Medrol (methylprednisolone) 125 mg IV x 1 dose    [] Hydrocortisone (SOLU-CORTEF) 100 mg. IV x 1 dose    [] Lidocaine 1% 0.4 ml subq for IV start    Electronically signed by provider ___Genevieve Flores DO    _________________12/27/2022 9:06 AM

## 2022-12-27 NOTE — TELEPHONE ENCOUNTER
Patient returned call to office. Pt notified per Dr Margaret Arriola of need for heart cath. Pt advised to go to ER for any chest pains or worsening sob. Pt verbalized understanding and stated she will do the cath. Pt advised that orders will be sent to  and that [place] will call her to schedule the cath. Pt verbalized understanding and had no further questions at the time.

## 2023-01-02 SDOH — HEALTH STABILITY: PHYSICAL HEALTH: ON AVERAGE, HOW MANY DAYS PER WEEK DO YOU ENGAGE IN MODERATE TO STRENUOUS EXERCISE (LIKE A BRISK WALK)?: 3 DAYS

## 2023-01-02 ASSESSMENT — LIFESTYLE VARIABLES
HOW MANY STANDARD DRINKS CONTAINING ALCOHOL DO YOU HAVE ON A TYPICAL DAY: 0
HOW OFTEN DO YOU HAVE SIX OR MORE DRINKS ON ONE OCCASION: 1
HOW OFTEN DO YOU HAVE A DRINK CONTAINING ALCOHOL: NEVER
HOW MANY STANDARD DRINKS CONTAINING ALCOHOL DO YOU HAVE ON A TYPICAL DAY: PATIENT DOES NOT DRINK
HOW OFTEN DO YOU HAVE A DRINK CONTAINING ALCOHOL: 1

## 2023-01-02 ASSESSMENT — PATIENT HEALTH QUESTIONNAIRE - PHQ9
SUM OF ALL RESPONSES TO PHQ QUESTIONS 1-9: 2
1. LITTLE INTEREST OR PLEASURE IN DOING THINGS: 1
SUM OF ALL RESPONSES TO PHQ QUESTIONS 1-9: 2
2. FEELING DOWN, DEPRESSED OR HOPELESS: 1
SUM OF ALL RESPONSES TO PHQ QUESTIONS 1-9: 2
SUM OF ALL RESPONSES TO PHQ9 QUESTIONS 1 & 2: 2
SUM OF ALL RESPONSES TO PHQ QUESTIONS 1-9: 2

## 2023-01-09 ENCOUNTER — OFFICE VISIT (OUTPATIENT)
Dept: INTERNAL MEDICINE | Age: 73
End: 2023-01-09
Payer: MEDICARE

## 2023-01-09 VITALS
HEART RATE: 72 BPM | DIASTOLIC BLOOD PRESSURE: 80 MMHG | SYSTOLIC BLOOD PRESSURE: 136 MMHG | WEIGHT: 138.8 LBS | BODY MASS INDEX: 23.13 KG/M2 | HEIGHT: 65 IN

## 2023-01-09 DIAGNOSIS — L71.9 ROSACEA: ICD-10-CM

## 2023-01-09 DIAGNOSIS — K21.9 GASTROESOPHAGEAL REFLUX DISEASE WITHOUT ESOPHAGITIS: ICD-10-CM

## 2023-01-09 DIAGNOSIS — Z00.00 MEDICARE ANNUAL WELLNESS VISIT, SUBSEQUENT: Primary | ICD-10-CM

## 2023-01-09 DIAGNOSIS — F43.29 STRESS AND ADJUSTMENT REACTION: ICD-10-CM

## 2023-01-09 DIAGNOSIS — M17.0 BILATERAL PRIMARY OSTEOARTHRITIS OF KNEE: ICD-10-CM

## 2023-01-09 DIAGNOSIS — R94.39 ABNORMAL STRESS TEST: ICD-10-CM

## 2023-01-09 DIAGNOSIS — R94.31 ABNORMAL EKG: ICD-10-CM

## 2023-01-09 DIAGNOSIS — R23.2 HOT FLASHES: ICD-10-CM

## 2023-01-09 DIAGNOSIS — R79.9 ABNORMAL FINDING OF BLOOD CHEMISTRY, UNSPECIFIED: ICD-10-CM

## 2023-01-09 DIAGNOSIS — M17.11 LOCALIZED OSTEOARTHRITIS OF RIGHT KNEE: ICD-10-CM

## 2023-01-09 DIAGNOSIS — E55.9 VITAMIN D DEFICIENCY: ICD-10-CM

## 2023-01-09 DIAGNOSIS — I50.22 CHRONIC SYSTOLIC (CONGESTIVE) HEART FAILURE (HCC): ICD-10-CM

## 2023-01-09 PROCEDURE — 99214 OFFICE O/P EST MOD 30 MIN: CPT | Performed by: NURSE PRACTITIONER

## 2023-01-09 NOTE — PROGRESS NOTES
Medicare Annual Wellness Visit    mAber Tran is here for Medicare AWV and Annual Exam    Assessment & Plan    Recommendations for Preventive Services Due: see orders and patient instructions/AVS.  Recommended screening schedule for the next 5-10 years is provided to the patient in written form: see Patient Instructions/AVS.     No follow-ups on file. Subjective       Patient's complete Health Risk Assessment and screening values have been reviewed and are found in Flowsheets. The following problems were reviewed today and where indicated follow up appointments were made and/or referrals ordered. Positive Risk Factor Screenings with Interventions:               General HRA Questions:  Select all that apply: (!) Stress    Stress Interventions:  See A/P for plan and any pertinent orders      Social and Emotional Support:  Do you get the social and emotional support that you need?: (!) No  Interventions:  See A/P for plan and any pertinent orders       Vision Screen:  Do you have difficulty driving, watching TV, or doing any of your daily activities because of your eyesight?: No  Have you had an eye exam within the past year?: (!) No  No results found. Interventions:   Patient encouraged to make appointment with their eye specialist    Safety:  Do you have any tripping hazards - loose or unsecured carpets or rugs?: (!) Yes    Interventions:  See AVS for additional education material                     Objective   Vitals:    01/09/23 1324   BP: 136/80   Site: Left Upper Arm   Position: Sitting   Cuff Size: Large Adult   Pulse: 72   Weight: 138 lb 12.8 oz (63 kg)   Height: 5' 5\" (1.651 m)      Body mass index is 23.1 kg/m². Allergies   Allergen Reactions    Codeine Nausea And Vomiting    Hydrocodone Nausea And Vomiting    Other Nausea And Vomiting     \"certain types of anesthesia\" per pt     Prior to Visit Medications    Medication Sig Taking?  Authorizing Provider   acetaminophen (TYLENOL) 500 MG tablet Take 1-2 tabs by mouth nightly prn Yes Historical Provider, MD   ibuprofen (ADVIL;MOTRIN) 200 MG tablet Take 400 mg by mouth every 6 hours as needed for Pain Yes Historical Provider, MD   omeprazole (PRILOSEC) 20 MG delayed release capsule Take 1 capsule by mouth Daily Yes ELANA Aguilar CNP   vitamin D (CHOLECALCIFEROL) 25 MCG (1000 UT) TABS tablet Take 1,000 Units by mouth three times a week Yes Historical Provider, MD   Multiple Vitamin (MULTIVITAMIN ADULT PO) Take 1 tablet by mouth daily Yes Historical Provider, MD   Calcium 600-200 MG-UNIT TABS Calcium with Vitamin D. Take 1 tablet by mouth every day Except Sunday. Yes Historical Provider, MD   doxycycline hyclate (VIBRA-TABS) 100 MG tablet 2 times a week (Tues & Thurs) Yes Historical Provider, MD Muñoz (Including outside providers/suppliers regularly involved in providing care):   Patient Care Team:  ELANA Aguilar CNP as PCP - General (Nurse Practitioner)  ELANA Aguilar CNP as PCP - [address] [place] [address] Dr Camacholed Provider     Reviewed and updated this visit:  Tobacco  Allergies  Meds  Med Hx  Surg Hx  Soc Hx  Fam Hx        I, Sergio Garrett LPN, 8/9/4679, performed the documented evaluation under the direct supervision of the attending physician.

## 2023-01-10 ENCOUNTER — HOSPITAL ENCOUNTER (OUTPATIENT)
Age: 73
Discharge: HOME | End: 2023-01-10
Payer: MEDICARE

## 2023-01-10 DIAGNOSIS — R79.9 ABNORMAL FINDING OF BLOOD CHEMISTRY, UNSPECIFIED: ICD-10-CM

## 2023-01-10 DIAGNOSIS — R94.31 ABNORMAL EKG: ICD-10-CM

## 2023-01-10 DIAGNOSIS — K21.9 GASTROESOPHAGEAL REFLUX DISEASE WITHOUT ESOPHAGITIS: ICD-10-CM

## 2023-01-10 DIAGNOSIS — E55.9 VITAMIN D DEFICIENCY: ICD-10-CM

## 2023-01-10 LAB
ABSOLUTE EOS #: 0.04 K/UL (ref 0–0.44)
ABSOLUTE EOS #: 0.04 K/UL (ref 0–0.44)
ABSOLUTE IMMATURE GRANULOCYTE: 0.03 K/UL (ref 0–0.3)
ABSOLUTE IMMATURE GRANULOCYTE: 0.03 K/UL (ref 0–0.3)
ABSOLUTE LYMPH #: 2.26 K/UL (ref 1.1–3.7)
ABSOLUTE LYMPH #: 2.26 K/UL (ref 1.1–3.7)
ABSOLUTE MONO #: 0.65 K/UL (ref 0.1–1.2)
ABSOLUTE MONO #: 0.65 K/UL (ref 0.1–1.2)
ALBUMIN SERPL-MCNC: 4.5 G/DL (ref 3.5–5.2)
ALBUMIN/GLOBULIN RATIO: 1.5 (ref 1–2.5)
ALP BLD-CCNC: 95 U/L (ref 35–104)
ALT SERPL-CCNC: 12 U/L (ref 5–33)
ANION GAP SERPL CALCULATED.3IONS-SCNC: 10 MMOL/L (ref 9–17)
ANION GAP SERPL CALCULATED.3IONS-SCNC: 10 MMOL/L (ref 9–17)
AST SERPL-CCNC: 19 U/L
BASOPHILS # BLD: 1 % (ref 0–2)
BASOPHILS # BLD: 1 % (ref 0–2)
BASOPHILS ABSOLUTE: 0.04 K/UL (ref 0–0.2)
BASOPHILS ABSOLUTE: 0.04 K/UL (ref 0–0.2)
BILIRUB SERPL-MCNC: 0.7 MG/DL (ref 0.3–1.2)
BUN BLDV-MCNC: 11 MG/DL (ref 8–23)
BUN BLDV-MCNC: 11 MG/DL (ref 8–23)
BUN/CREAT BLD: 16 (ref 9–20)
BUN/CREAT BLD: 16 (ref 9–20)
CALCIUM SERPL-MCNC: 10.4 MG/DL (ref 8.6–10.4)
CALCIUM SERPL-MCNC: 10.4 MG/DL (ref 8.6–10.4)
CHLORIDE BLD-SCNC: 103 MMOL/L (ref 98–107)
CHLORIDE BLD-SCNC: 103 MMOL/L (ref 98–107)
CHOLESTEROL/HDL RATIO: 2.4
CHOLESTEROL: 143 MG/DL
CO2: 28 MMOL/L (ref 20–31)
CO2: 28 MMOL/L (ref 20–31)
CREAT SERPL-MCNC: 0.68 MG/DL (ref 0.5–0.9)
CREAT SERPL-MCNC: 0.68 MG/DL (ref 0.5–0.9)
EOSINOPHILS RELATIVE PERCENT: 1 % (ref 1–4)
EOSINOPHILS RELATIVE PERCENT: 1 % (ref 1–4)
GFR SERPL CREATININE-BSD FRML MDRD: >60 ML/MIN/1.73M2
GFR SERPL CREATININE-BSD FRML MDRD: >60 ML/MIN/1.73M2
GLUCOSE BLD-MCNC: 89 MG/DL (ref 70–99)
GLUCOSE BLD-MCNC: 89 MG/DL (ref 70–99)
HCT VFR BLD CALC: 44.3 % (ref 36.3–47.1)
HCT VFR BLD CALC: 44.3 % (ref 36.3–47.1)
HDLC SERPL-MCNC: 59 MG/DL
HEMOGLOBIN: 15 G/DL (ref 11.9–15.1)
HEMOGLOBIN: 15 G/DL (ref 11.9–15.1)
IMMATURE GRANULOCYTES: 1 %
IMMATURE GRANULOCYTES: 1 %
LDL CHOLESTEROL: 64 MG/DL (ref 0–130)
LYMPHOCYTES # BLD: 39 % (ref 24–43)
LYMPHOCYTES # BLD: 39 % (ref 24–43)
MCH RBC QN AUTO: 31.6 PG (ref 25.2–33.5)
MCH RBC QN AUTO: 31.6 PG (ref 25.2–33.5)
MCHC RBC AUTO-ENTMCNC: 33.9 G/DL (ref 25.2–33.5)
MCHC RBC AUTO-ENTMCNC: 33.9 G/DL (ref 25.2–33.5)
MCV RBC AUTO: 93.3 FL (ref 82.6–102.9)
MCV RBC AUTO: 93.3 FL (ref 82.6–102.9)
MONOCYTES # BLD: 11 % (ref 3–12)
MONOCYTES # BLD: 11 % (ref 3–12)
NRBC AUTOMATED: 0 PER 100 WBC
NRBC AUTOMATED: 0 PER 100 WBC
PDW BLD-RTO: 12.5 % (ref 11.8–14.4)
PDW BLD-RTO: 12.5 % (ref 11.8–14.4)
PLATELET # BLD: 270 K/UL (ref 138–453)
PLATELET # BLD: 270 K/UL (ref 138–453)
PMV BLD AUTO: 11.6 FL (ref 8.1–13.5)
PMV BLD AUTO: 11.6 FL (ref 8.1–13.5)
POTASSIUM SERPL-SCNC: 4.1 MMOL/L (ref 3.7–5.3)
POTASSIUM SERPL-SCNC: 4.1 MMOL/L (ref 3.7–5.3)
RBC # BLD: 4.75 M/UL (ref 3.95–5.11)
RBC # BLD: 4.75 M/UL (ref 3.95–5.11)
SEG NEUTROPHILS: 47 % (ref 36–65)
SEG NEUTROPHILS: 48 % (ref 36–65)
SEGMENTED NEUTROPHILS ABSOLUTE COUNT: 2.78 K/UL (ref 1.5–8.1)
SEGMENTED NEUTROPHILS ABSOLUTE COUNT: 2.78 K/UL (ref 1.5–8.1)
SODIUM BLD-SCNC: 141 MMOL/L (ref 135–144)
SODIUM BLD-SCNC: 141 MMOL/L (ref 135–144)
TOTAL PROTEIN: 7.6 G/DL (ref 6.4–8.3)
TRIGL SERPL-MCNC: 102 MG/DL
VITAMIN D 25-HYDROXY: 57.9 NG/ML
WBC # BLD: 5.8 K/UL (ref 3.5–11.3)
WBC # BLD: 5.8 K/UL (ref 3.5–11.3)

## 2023-01-10 PROCEDURE — 85025 COMPLETE CBC W/AUTO DIFF WBC: CPT

## 2023-01-10 PROCEDURE — 80053 COMPREHEN METABOLIC PANEL: CPT

## 2023-01-10 PROCEDURE — 36415 COLL VENOUS BLD VENIPUNCTURE: CPT

## 2023-01-10 PROCEDURE — 80061 LIPID PANEL: CPT

## 2023-01-10 PROCEDURE — 80048 BASIC METABOLIC PNL TOTAL CA: CPT

## 2023-01-10 PROCEDURE — 82306 VITAMIN D 25 HYDROXY: CPT

## 2023-01-10 NOTE — PROGRESS NOTES
01/09/23  Keily Alanis  1950      Chief Complaint:   1. Medicare annual wellness visit, subsequent    2. Gastroesophageal reflux disease without esophagitis    3. Vitamin D deficiency    4. Bilateral primary osteoarthritis of knee    5. Rosacea    6. Hot flashes    7. Abnormal EKG    8. Localized osteoarthritis of right knee    9. Stress and adjustment reaction    10. Abnormal finding of blood chemistry, unspecified     11. Chronic systolic (congestive) heart failure ([address]    12. Abnormal stress test        HPI:  Pleasant 71-year-old female in for annual wellness visit and routine physical exam.  Did have some positives on annual wellness including positive stress, weight loss and lack of support. Her daughter who has lived with her has decided she is going under gender transformation. Patient states she does not believe in this and is having a hard time with her daughter transferring to a male. States she has reached out to groups online and her  which she feels has been beneficial.  She declines any medication for anxiety or stress. She is in agreement with seeing behavioral health at clinic. Continues on her omeprazole for her reflux this has been stable. Taking her vitamin D for deficiency. No unwanted side effects. Chronic arthritic pain of knees. Was set up to have surgery needed cardiac clearance underwent stress test which showed diastolic dysfunction, EF of 45 to 50% along with a positive stress test.  She is set up for cardiac cath 1/17/2023.     Allergies   Allergen Reactions    Codeine Nausea And Vomiting    Hydrocodone Nausea And Vomiting    Other Nausea And Vomiting     \"certain types of anesthesia\" per pt       Past Medical History:   Diagnosis Date    Cyst of ovary 2008    LEFT    Deep vein thrombosis ([address]     Fracture 2007    left pinky finger fx due to fall    H/O heartburn     TUMs as needed    Hx of blood clots 2012    RT LEG-DURRING VEIN STRIPPING    Lumbar radiculopathy Rib fracture 03/02/2015    fell outside while walking dogs-LEFT    Rosacea 2009    CREAM AS NEEDED    Snores     Stress fracture of metatarsal bone of left foot 04/2020    2nd metatarsal    Wears contact lenses        Past Surgical History:   Procedure Laterality Date    BACK SURGERY Left 06/21/2017    microdiscectomy L4-5    DILATION AND CURETTAGE      DILATION AND CURETTAGE OF UTERUS  1980    DILATION AND CURETTAGE OF UTERUS N/A 04/10/2019    D & C HYSTEROSCOPY & polypectomy performed by Danitza Nunes MD at [address] Left 06/21/2017    MICRODISCECTOMY L4-5, (PRONE), BRYANT TABLE, Jefe Rings, C-ARM, MICROSCOPE performed by Kwame Moss MD at [facility] Right 1981    Removal of birth [place]. -RT AXILLIA    GA COLONOSCOPY FLX DX W/COLLJ SPEC WHEN PFRMD N/A 07/16/2018    COLONOSCOPY performed by Estrada Christopher MD at [address] Right 01/31/2022    Dr. Lamont Bardales ENDOSCOPY N/A 07/16/2018    EGD BIOPSY performed by Estrada Christopher MD at [place] [id number] Bilateral 06/2020    , patient told from knee down was only 2 veins not three       Current Outpatient Medications on File Prior to Visit   Medication Sig Dispense Refill    acetaminophen (TYLENOL) 500 MG tablet Take 1-2 tabs by mouth nightly prn      ibuprofen (ADVIL;MOTRIN) 200 MG tablet Take 400 mg by mouth every 6 hours as needed for Pain      omeprazole (PRILOSEC) 20 MG delayed release capsule Take 1 capsule by mouth Daily 90 capsule 3    vitamin D (CHOLECALCIFEROL) 25 MCG (1000 UT) TABS tablet Take 1,000 Units by mouth three times a week      Multiple Vitamin (MULTIVITAMIN ADULT PO) Take 1 tablet by mouth daily      Calcium 600-200 MG-UNIT TABS Calcium with Vitamin D. Take 1 tablet by mouth every day Except Sunday.       doxycycline hyclate (VIBRA-TABS) 100 MG tablet 2 times a week (Tues & Thurs)       No current facility-administered medications on file prior to visit. Social History     Socioeconomic History    Marital status:      Spouse name: Not on file    Number of children: Not on file    Years of education: Not on file    Highest education level: Not on file   Occupational History    Not on file   Tobacco Use    Smoking status: Never    Smokeless tobacco: Never   Vaping Use    Vaping Use: Never used   Substance and Sexual Activity    Alcohol use: No     Alcohol/week: 0.0 standard drinks    Drug use: No    Sexual activity: Not Currently     Partners: Male     Birth control/protection: Post-menopausal     Comment: Menarche age 16   Menopause age 49   Other Topics Concern    Not on file   Social History Narrative    Not on file     Social Determinants of Health     Financial Resource Strain: Not on file   Food Insecurity: Not on file   Transportation Needs: Not on file   Physical Activity: Unknown    Days of Exercise per Week: 3 days    Minutes of Exercise per Session: Not on file   Stress: Not on file   Social Connections: Not on file   Intimate Partner Violence: Not on file   Housing Stability: Not on file       Review of Systems   Constitutional:  Positive for appetite change and fatigue. Negative for activity change, chills, diaphoresis, fever and unexpected weight change. HENT:  Negative for congestion, dental problem, ear discharge, ear pain, facial swelling, hearing loss, postnasal drip, rhinorrhea, sinus pressure, sore throat and trouble swallowing. Eyes:  Negative for pain and visual disturbance. Respiratory:  Negative for cough, chest tightness, shortness of breath and wheezing. Cardiovascular:  Negative for chest pain, palpitations and leg swelling. Gastrointestinal:  Negative for abdominal pain, blood in stool, constipation, diarrhea, nausea and vomiting. Endocrine: Negative for cold intolerance, heat intolerance and polyuria. Genitourinary:  Negative for difficulty urinating.    Musculoskeletal:  Positive for arthralgias. Negative for gait problem, myalgias, neck pain and neck stiffness. Skin:  Negative for color change, rash and wound. Neurological:  Negative for dizziness, tremors, seizures, weakness, light-headedness, numbness and headaches. Psychiatric/Behavioral:  Positive for decreased concentration, dysphoric mood and sleep disturbance. Negative for agitation, confusion, hallucinations, self-injury and suicidal ideas. The patient is not nervous/anxious and is not hyperactive. Physical Exam  Vitals and nursing note reviewed. Constitutional:       General: She is not in acute distress. Appearance: She is well-developed. She is not diaphoretic. HENT:      Head: Normocephalic and atraumatic. Right Ear: External ear normal. No drainage, swelling or tenderness. Tympanic membrane is not perforated, erythematous, retracted or bulging. Left Ear: External ear normal. No drainage, swelling or tenderness. Tympanic membrane is not perforated, erythematous, retracted or bulging. Nose: Nose normal. No laceration, mucosal edema or rhinorrhea. Right Sinus: No maxillary sinus tenderness or frontal sinus tenderness. Left Sinus: No maxillary sinus tenderness or frontal sinus tenderness. Mouth/Throat:      Mouth: Mucous membranes are not pale and not dry. Pharynx: Uvula midline. No oropharyngeal exudate, posterior oropharyngeal erythema or uvula swelling. Tonsils: No tonsillar abscesses. Eyes:      General:         Right eye: No discharge. Left eye: No discharge. Conjunctiva/sclera: Conjunctivae normal.      Pupils: Pupils are equal, round, and reactive to light. Neck:      Trachea: No tracheal deviation. Cardiovascular:      Rate and Rhythm: Normal rate and regular rhythm. Heart sounds: Normal heart sounds. No murmur heard. No friction rub. No gallop. Pulmonary:      Effort: Pulmonary effort is normal. No respiratory distress.       Breath sounds: Normal breath sounds. No wheezing or rales. Abdominal:      General: Bowel sounds are normal.      Palpations: Abdomen is soft. Musculoskeletal:      Cervical back: Neck supple. Lymphadenopathy:      Cervical: No cervical adenopathy. Skin:     General: Skin is warm and dry. Findings: No rash. Neurological:      Mental Status: She is alert and oriented to person, place, and time. Cranial Nerves: No cranial nerve deficit. Psychiatric:         Behavior: Behavior normal.         Thought Content: Thought content normal.     Vitals:    01/09/23 1324   BP: 136/80   Site: Left Upper Arm   Position: Sitting   Cuff Size: Large Adult   Pulse: 72   Weight: 138 lb 12.8 oz (63 kg)   Height: 5' 5\" (1.651 m)       Assessment:  1. Medicare annual wellness visit, subsequent  2. Gastroesophageal reflux disease without esophagitis  Stable on PPI, reflux precautions  - CBC with Auto Differential; Future  - Comprehensive Metabolic Panel; Future    3. Vitamin D deficiency  Continue on supplemental vitamin D. Serial lab follow-up  - Vitamin D 25 Hydroxy; Future    4. Bilateral primary osteoarthritis of knee  Awaiting surgery on knee however positive stress test due to abnormal EKG abnormal echo. Is set up for cardiac cath 1/17/2023 if develops any chest discomfort straight to the ER    5. Rosacea  Stable on doxycycline twice a week    6. Hot flashes  Stable at present    7. Abnormal EKG  - Lipid Panel; Future    8. Localized osteoarthritis of right knee  Awaiting surgery    9. Stress and adjustment reaction  - Prakash Delatorre, [address] [place] [address], [facility], [address] [facility] [address]    10. Abnormal finding of blood chemistry, unspecified   - Lipid Panel; Future    11. Chronic systolic (congestive) heart failure (HCC)  Last EF 45 to 35% showed diastolic dysfunction. No signs of fluid overload    12. Abnormal stress test  Awaiting cardiac cath as noted above      Plan:  As noted above. Follow up for routine visit.   Call sooner with concerns prior.     Electronically signed by ELANA Galvan CNP on 1/9/2023 at 9:24 PM

## 2023-01-13 ASSESSMENT — ENCOUNTER SYMPTOMS
BLOOD IN STOOL: 0
CONSTIPATION: 0
RHINORRHEA: 0
WHEEZING: 0
DIARRHEA: 0
SORE THROAT: 0
COUGH: 0
COLOR CHANGE: 0
TROUBLE SWALLOWING: 0
ABDOMINAL PAIN: 0
SINUS PRESSURE: 0
FACIAL SWELLING: 0
NAUSEA: 0
VOMITING: 0
EYE PAIN: 0
SHORTNESS OF BREATH: 0
CHEST TIGHTNESS: 0

## 2023-01-17 ENCOUNTER — HOSPITAL ENCOUNTER (OUTPATIENT)
Dept: CARDIAC CATH/INVASIVE PROCEDURES | Age: 73
Discharge: HOME | End: 2023-01-17
Payer: MEDICARE

## 2023-01-17 VITALS
DIASTOLIC BLOOD PRESSURE: 56 MMHG | SYSTOLIC BLOOD PRESSURE: 149 MMHG | HEIGHT: 65 IN | WEIGHT: 139 LBS | HEART RATE: 67 BPM | RESPIRATION RATE: 26 BRPM | OXYGEN SATURATION: 98 % | TEMPERATURE: 97.9 F | BODY MASS INDEX: 23.16 KG/M2

## 2023-01-17 LAB
EGFR, POC: >60 ML/MIN/1.73M2
GLUCOSE BLD-MCNC: 94 MG/DL (ref 74–100)
LV EF: 55 %
LVEF MODALITY: NORMAL
PLATELET # BLD: 219 K/UL (ref 138–453)
POC BUN: 11 MG/DL (ref 8–26)
POC CHLORIDE: 107 MMOL/L (ref 98–107)
POC CREATININE: 0.62 MG/DL (ref 0.51–1.19)
POC HEMATOCRIT: 45 % (ref 36–46)
POC HEMOGLOBIN: 15.1 G/DL (ref 12–16)
POC POTASSIUM: 4 MMOL/L (ref 3.5–4.5)
POC SODIUM: 142 MMOL/L (ref 138–146)

## 2023-01-17 PROCEDURE — 85014 HEMATOCRIT: CPT

## 2023-01-17 PROCEDURE — 7100000000 HC PACU RECOVERY - FIRST 15 MIN

## 2023-01-17 PROCEDURE — 93458 L HRT ARTERY/VENTRICLE ANGIO: CPT

## 2023-01-17 PROCEDURE — 2709999900 HC NON-CHARGEABLE SUPPLY

## 2023-01-17 PROCEDURE — C1760 CLOSURE DEV, VASC: HCPCS

## 2023-01-17 PROCEDURE — 84295 ASSAY OF SERUM SODIUM: CPT

## 2023-01-17 PROCEDURE — 6360000002 HC RX W HCPCS

## 2023-01-17 PROCEDURE — 82435 ASSAY OF BLOOD CHLORIDE: CPT

## 2023-01-17 PROCEDURE — C1894 INTRO/SHEATH, NON-LASER: HCPCS

## 2023-01-17 PROCEDURE — 2500000003 HC RX 250 WO HCPCS

## 2023-01-17 PROCEDURE — 7100000001 HC PACU RECOVERY - ADDTL 15 MIN

## 2023-01-17 PROCEDURE — 84520 ASSAY OF UREA NITROGEN: CPT

## 2023-01-17 PROCEDURE — 6360000004 HC RX CONTRAST MEDICATION

## 2023-01-17 PROCEDURE — 85049 AUTOMATED PLATELET COUNT: CPT

## 2023-01-17 PROCEDURE — 82565 ASSAY OF CREATININE: CPT

## 2023-01-17 PROCEDURE — C1892 INTRO/SHEATH,FIXED,PEEL-AWAY: HCPCS

## 2023-01-17 PROCEDURE — 2580000003 HC RX 258: Performed by: INTERNAL MEDICINE

## 2023-01-17 PROCEDURE — 84132 ASSAY OF SERUM POTASSIUM: CPT

## 2023-01-17 PROCEDURE — 82947 ASSAY GLUCOSE BLOOD QUANT: CPT

## 2023-01-17 RX ORDER — SODIUM CHLORIDE 9 MG/ML
INJECTION, SOLUTION INTRAVENOUS CONTINUOUS
Status: DISCONTINUED | OUTPATIENT
Start: 2023-01-17 | End: 2023-01-18 | Stop reason: HOSPADM

## 2023-01-17 RX ADMIN — SODIUM CHLORIDE: 9 INJECTION, SOLUTION INTRAVENOUS at 10:07

## 2023-01-17 NOTE — PROGRESS NOTES
Band aid changed / old drainage noted. Site remains clean soft and dry. Wound care instructions again reviewed with patient and family. All discharge instructions reviewed, questions answered, paper signed and given copy. Patient discharged per wheelchair with family and belongings.

## 2023-01-17 NOTE — PROGRESS NOTES
Received post procedure to [facility] to room 10. Assessment obtained. Restrictions reviewed with patient. Post procedure pathway initiated. Right femoral site soft , band aid dry and intact. No hematoma noted. Family at side. Patient without complaints. Head of bed flat with right leg straight.

## 2023-01-17 NOTE — H&P
Attestation signed by      Attending Physician Statement:    I have discussed the care of  Maida Beltran , including pertinent history and exam findings, with the Cardiology fellow/resident. I have seen and examined the patient and the key elements of all parts of the encounter have been performed by me. I agree with the assessment, plan and orders as documented by the fellow/resident, after I modified exam findings and plan of treatments, and the final version is my approved version of the assessment. Additional Comments: [facility]    Consult / H&P               Today's Date: 1/17/2023  Patient Name: Maida Beltran  Date of admission: No admission date for patient encounter. Patient's age: 71 y. o., 1950  Admission Dx: No admission diagnoses are documented for this encounter. Reason for Consult:  Cardiac evaluation    Requesting Physician: No admitting provider for patient encounter. CHIEF COMPLAINT:  Elective cath     History Obtained From:  patient    HISTORY OF PRESENT ILLNESS:      The patient is a 71 y.o. is here for elective cath   C/o SOB and had stress test done and it was abnormal as below. Past Medical History:   has a past medical history of Cyst of ovary, Deep vein thrombosis ([address] Fracture, H/O heartburn, Hx of blood clots, Lumbar radiculopathy, Rib fracture, Rosacea, Snores, Stress fracture of metatarsal bone of left foot, and Wears contact lenses. Past Surgical History:   has a past surgical history that includes Dilation and curettage of uterus (1980); other surgical history (Right, 1981); Tubal ligation (1982); back surgery (Left, 06/21/2017); laminectomy (Left, 06/21/2017); pr colonoscopy flx dx w/collj spec when pfrmd (N/A, 07/16/2018); Upper gastrointestinal endoscopy (N/A, 07/16/2018); Dilation and curettage of uterus (N/A, 04/10/2019); Vein Surgery (Bilateral, 06/2020);  Total knee arthroplasty (Right, 01/31/2022); and Dilation & curettage. Home Medications:    Prior to Admission medications    Medication Sig Start Date End Date Taking? Authorizing Provider   acetaminophen (TYLENOL) 500 MG tablet Take 1-2 tabs by mouth nightly prn    Historical Provider, MD   ibuprofen (ADVIL;MOTRIN) 200 MG tablet Take 400 mg by mouth every 6 hours as needed for Pain    Historical Provider, MD   omeprazole (PRILOSEC) 20 MG delayed release capsule Take 1 capsule by mouth Daily 1/5/22   ELANA Bonds - CNP   vitamin D (CHOLECALCIFEROL) 25 MCG (1000 UT) TABS tablet Take 1,000 Units by mouth three times a week    Historical Provider, MD   Multiple Vitamin (MULTIVITAMIN ADULT PO) Take 1 tablet by mouth daily    Historical Provider, MD   Calcium 600-200 MG-UNIT TABS Calcium with Vitamin D. Take 1 tablet by mouth every day Except Sunday. Historical Provider, MD   doxycycline hyclate (VIBRA-TABS) 100 MG tablet 2 times a week (Tues & Thurs) 2/26/18   Historical Provider, MD      No current facility-administered medications for this encounter. Allergies:  Codeine, Hydrocodone, and Other    Social History:   reports that she has never smoked. She has never used smokeless tobacco. She reports that she does not drink alcohol and does not use drugs. Family History: family history includes Breast Cancer in her maternal aunt; Cirrhosis in her maternal grandfather; Diabetes in her mother; Heart Disease in her maternal grandfather and maternal grandmother; Hypertension in her mother; No Known Problems in her paternal aunt, paternal aunt, and paternal aunt; Obesity in her mother; Osteoporosis in her mother; Other (age of onset: 32) in her brother; Other (age of onset: 80) in her mother; Uterine Cancer in her mother. No h/o sudden cardiac death. No for premature CAD    REVIEW OF SYSTEMS:    Constitutional: there has been no unanticipated weight loss. There's been No change in energy level, No change in activity level.      Eyes: No visual changes or diplopia. No scleral icterus. ENT: No Headaches  Cardiovascular: No cardiac history  Respiratory: No previous pulmonary problems, No cough  Gastrointestinal: No abdominal pain. No change in bowel or bladder habits. Genitourinary: No dysuria, trouble voiding, or hematuria. Musculoskeletal:  No gait disturbance, No weakness or joint complaints. Integumentary: No rash or pruritis. Neurological: No headache, diplopia, change in muscle strength, numbness or tingling. No change in gait, balance, coordination, mood, affect, memory, mentation, behavior. Psychiatric: No anxiety, or depression. Endocrine: No temperature intolerance. No excessive thirst, fluid intake, or urination. No tremor. Hematologic/Lymphatic: No abnormal bruising or bleeding, blood clots or swollen lymph nodes. Allergic/Immunologic: No nasal congestion or hives. PHYSICAL EXAM:      [facility] 06/20/2007 (Approximate)    Constitutional and General Appearance: alert, cooperative, no distress and appears stated age  [de-identified]: PERRL, no cervical lymphadenopathy. No masses palpable. Normal oral mucosa  Respiratory:  Normal excursion and expansion without use of accessory muscles  Resp Auscultation: Good respiratory effort. No for increased work of breathing. On auscultation: clear to auscultation bilaterally  Cardiovascular: The apical impulse is not displaced  Heart tones are crisp and normal. regular S1 and S2.  Jugular venous pulsation Normal  The carotid upstroke is normal in amplitude and contour without delay or bruit  Peripheral pulses are symmetrical and full   Abdomen:   No masses or tenderness  Bowel sounds present  Extremities:   No Cyanosis or Clubbing   Lower extremity edema: No   Skin: Warm and dry  Neurological:  Alert and oriented. Moves all extremities well  No abnormalities of mood, affect, memory, mentation, or behavior are noted    DATA:        CBC: No results for input(s): WBC, HGB, HCT, PLT in the last 72 hours.   BMP: No results for input(s): NA, K, CO2, BUN, CREATININE, LABGLOM, GLUCOSE in the last 72 hours. BNP: No results for input(s): BNP in the last 72 hours. PT/INR: No results for input(s): PROTIME, INR in the last 72 hours. APTT:No results for input(s): APTT in the last 72 hours. CARDIAC ENZYMES:No results for input(s): CKTOTAL, CKMB, CKMBINDEX, TROPONINI in the last 72 hours. FASTING LIPID PANEL:  Lab Results   Component Value Date/Time    HDL 59 01/10/2023 07:27 AM    LDLCALC 89 10/11/2019 12:00 AM    TRIG 102 01/10/2023 07:27 AM     LIVER PROFILE:No results for input(s): AST, ALT, LABALBU in the last 72 hours. IMPRESSION:    Patient Active Problem List   Diagnosis    Herniated nucleus pulposus, L4-5    Chronic systolic (congestive) heart failure         Stress test 12/22/2022  IMPRESSION:  1. Moderate anterior wall ischemia. 2.  LVEF 49%. Interpretation:    Stress EKG inconclusive for ischemia due to abnormal baseline EKG  Nuclear scan to follow up         Assessment     MATHIS with abnormal stress test   HTN   Abnormal ECG    RECOMMENDATIONS:  Proceed with C   Follow post cath orders. Pre Procedure Conscious Sedation Data:  ASA Class:    [] I [] II [x] III [] IV    Mallampati Class:  [x] I [] II [] III [] IV        Discussed with patient and Nurse.     Electronically signed by Alane Gottron, MD on 1/17/2023 at [address] [facility]

## 2023-01-17 NOTE — DISCHARGE INSTRUCTIONS
Discharge Instructions for angiogram  Lavelle Vieira 62 to shower in AM. Discontinue band aid in AM.   Do not apply further band aids. Keep clean, dry and open to air. No powder or lotion. Do not soak in a pool or tub and do not swim for one week. If there is any bleeding at the catheter site, apply firm pressure with your hands until the bleeding stops. If bleeding continues after 3 minutes call 911. If there is any swelling or firm areas at your puncture site, this could be bleeding under the skin(hematoma), and if you have any concerns seek help immediately. Drink plenty of fluids after the test. This will flush the x-ray dye from your system. Return to your normal diet. The sedative will make you sleepy. Rest until the effects have worn off. Ask your doctor when you will be able to return to work. Avoid heavy lifting objects greater than 10  pounds, physically demanding activities, and sexual activity for 5-7 days. Your activity will also depend upon where the catheter was inserted: Do not sit for long periods of time. Try to change positions frequently. Medications   If advised by your doctor, resume taking your normal medicines. Use acetaminophen (Tylenol) for pain relief. Do not take metformin (Glucophage) or glyburide and metformin (Glucovance) for 48 hours after the test.    If you had to stop taking these medications before the procedure, ask your doctor when you can resume taking them:   If youAnti-inflammatory drugs (eg, ibuprofen )   Blood thinners, such as warfarin (Coumadin)   Clopidogrel (Plavix)   If you are taking medicines, follow these general guidelines:   Take your medicine as directed. Do not change the amount or the schedule. Do not stop taking them without talking to your doctor. Do not share them. Know what the results and side effects. Report them to your doctor. Some drugs can be dangerous when mixed.  Talk to a doctor or pharmacist if you are taking more than one drug. This includes over-the-counter medicine and herb or dietary supplements.   Plan ahead for refills so you don't run out.      Call Your Doctor If Any of the Following Occurs     :   Signs of infection, including fever and chills   Redness, swelling, increasing pain, excessive bleeding, or any discharge from the catheter insertion site   CALL 911 if you have symptoms including:   Drooping facial muscles   Changes in vision or speech   Difficulty walking or using your limbs   Change in sensation to affected leg, including numbness, feeling cold, or change in color   Extreme sweating, nausea or vomiting   Dizziness or lightheadedness   Chest pain   Rapid, irregular heartbeat   Palpitations   Cough, shortness of breath, or difficulty breathing   Weakness or fainting   If you think you have an emergency, CALL 911 .                                  Coronary artery disease (CAD) occurs when plaque builds up in the arteries that bring oxygen-rich blood to your heart. Plaque is a fatty substance made of cholesterol, calcium, and other substances in the blood. This process is called hardening of the arteries, or atherosclerosis.  What happens when you have coronary artery disease?  Plaque may narrow the coronary arteries. Narrowed arteries cause poor blood flow. This can lead to angina symptoms such as chest pain or discomfort. If blood flow is completely blocked, you could have a heart attack.  You can slow CAD and reduce the risk of future problems by making changes in your lifestyle. These include quitting smoking and eating heart-healthy foods.  Treatments for CAD, along with changes in your lifestyle, can help you live a longer and healthier life.  How can you prevent coronary artery disease?  Do not smoke. It may be the best thing you can do to prevent heart disease. If you need help quitting, talk to your doctor about stop-smoking programs and medicines. These can increase your chances of quitting  for good. Be active. Get at least 30 minutes of exercise on most days of the week. Walking is a good choice. You also may want to do other activities, such as running, swimming, cycling, or playing tennis or team sports. Eat heart-healthy foods. Eat more fruits and vegetables and less foods that contain saturated and trans fats. Limit alcohol, sodium, and sweets. Stay at a healthy weight. Lose weight if you need to. Manage other health problems such as diabetes, high blood pressure, and high cholesterol. Talk to your doctor about taking a daily aspirin. Manage stress. Stress can hurt your heart. To keep stress low, talk about your problems and feelings. Don't keep your feelings hidden. How is coronary artery disease treated? Your doctor will suggest that you make lifestyle changes. For example, your doctor may ask you to eat healthy foods, quit smoking, lose extra weight, and be more active. You will have to take medicines. Your doctor may suggest a procedure to open narrowed or blocked arteries. This is called angioplasty. Or your doctor may suggest using healthy blood vessels to create detours around narrowed or blocked arteries. This is called bypass surgery. Follow-up care is a key part of your treatment and safety. Be sure to make and go to all appointments, and call your doctor if you are having problems. It's also a good idea to know your test results and keep a list of the medicines you take. Where can you learn more? Go to https://pedro.[employer]. org and sign in to your [employer] account. Enter ([phone] in the [facility] box to learn more about Learning About Coronary Artery Disease (CAD).     If you do not have an account, please click on the Sign Up Now link. © 9362-5824 Healthwise, Incorporated. Care instructions adapted under license by [facility] ([facility]).  This care instruction is for use with your licensed healthcare professional. If you have questions about a medical condition or this instruction, always ask your healthcare professional. [place] [address] any warranty or liability for your use of this information. Content Version: 66.8.708812; Current as of: February 20, 2015      Discharge Instructions      SEDATION / ANALGESIA INFORMATION / Bela Magno Farmer have received the sedation/analgesia medication during your visit    Sedation/analgesia is used during short medical procedures under controlled supervision. The medication will produce a strong relaxation. You will be able to hear, speak and follow instructions, but your memory and alertness will be decreased. You will be able to swallow and breathe on your own. During sedation/analgesia your blood pressure, heart and breathing will be watched closely. After the procedure, you may not remember what was said or done. You may have the following effects from the medication. \" Drowsiness, dizziness, sleepiness or confusion. \" Difficulty remembering or delayed reaction times. \" Loss of fine muscle control or difficulty with your balance especially while walking. \" Difficulty focusing or blurred vision. You may not be aware of slight changes in your behavior and/or your reaction time because of the medication used during the procedure. Therefore you should follow these instructions. \" Have someone responsible help you with your care. \" Do not drive for 24 hours. \" Do not operate equipment for 24 hours (lawnmowers, power tools, kitchen accessories, stove). \" Do not drink any alcoholic beverages for a minimum of 24 hours. \" Do not make important personal, legal or business decisions for 24 hours. \" You may experience dizziness or lightheadedness. Move slowly and carefully, do not make sudden position changes. \" Drink extra amounts of fluids today. \" Increase your diet as tolerated (unless you have received specific instructions from your doctor).   \" If you feel nauseated, continue with liquids until the nausea is gone. \" Notify your physician if you have not urinated within 8 hours after the procedure. \" Resume your medications unless otherwise instructed          SEDATION / ANALGESIA INFORMATION / Bela Kiran 86 have received the sedation/analgesia medication during your visit    Sedation/analgesia is used during short medical procedures under controlled supervision. The medication will produce a strong relaxation. You will be able to hear, speak and follow instructions, but your memory and alertness will be decreased. You will be able to swallow and breathe on your own. During sedation/analgesia your blood pressure, heart and breathing will be watched closely. After the procedure, you may not remember what was said or done. You may have the following effects from the medication. \" Drowsiness, dizziness, sleepiness or confusion. \" Difficulty remembering or delayed reaction times. \" Loss of fine muscle control or difficulty with your balance especially while walking. \" Difficulty focusing or blurred vision. You may not be aware of slight changes in your behavior and/or your reaction time because of the medication used during the procedure. Therefore you should follow these instructions. \" Have someone responsible help you with your care. \" Do not drive for 24 hours. \" Do not operate equipment for 24 hours (lawnmowers, power tools, kitchen accessories, stove). \" Do not drink any alcoholic beverages for a minimum of 24 hours. \" Do not make important personal, legal or business decisions for 24 hours. \" You may experience dizziness or lightheadedness. Move slowly and carefully, do not make sudden position changes. \" Drink extra amounts of fluids today. \" Increase your diet as tolerated (unless you have received specific instructions from your doctor). \" If you feel nauseated, continue with liquids until the nausea is gone.   \" Notify your physician if you have not urinated within 8 hours after the procedure. \" Resume your medications unless otherwise instructed.

## 2023-01-17 NOTE — PROGRESS NOTES
Patient admitted, consent signed and questions answered. Patient ready for procedure. Call light to reach with side rails up 2 of 2. Left and right groin and right wrist clipped with writer and RN Abiel Schaefer present. Family at bedside with patient. History and physical needs completed.

## 2023-01-17 NOTE — OP NOTE
[facility]    CARDIAC CATHETERIZATION    Date:   1/17/2023  Patient name:  Maida Beltran  Date of admission:  1/17/2023  8:31 AM  MRN:   [id number]  YOB: 1950    Operators:  Primary:   S. Tilden Alpers, MD (Attending Physician)    Assistant/CV fellow:  Claudean Kindler, MD      Procedure performed:       [x] Left Heart Catheterization. [] Graft Angiography. [x] Left Ventriculography. [] Right Heart Catheterization. [x] Coronary Angiography. [] Aortic Valve Studies. [] PCI:      [] Other:       Pre Procedure Conscious Sedation Data:  ASA Class:    [] I [] II [x] III [] IV    Mallampati Class:  [] I [x] II [] III [] IV      Indication:  [] STEMI      [x] + Stress test  [] ACS      [] + EKG Changes  [] Non Q MI       [] Significant Risk Factors  [] Recurrent Angina             [] Diabetes Mellitus    [] New LBBB      [] Uncontrolled HTN. [] CHF / Low EF changes     [] Abnormal CTA / Ca Score      Procedure:  Access:  [x] Femoral  [] Radial  artery       [x] Right  [] Left    Procedure: After informed consent was obtained with explanation of the risks and benefits, patient was brought to the cath lab. The access area was prepped and draped in sterile fashion. 1% lidocaine was used for local block. The artery was cannulated with 6  Fr sheath with brisk arterial blood return. The side port was frequently flushed and aspirated with normal saline. Findings:     Angiographic Findings        Cardiac Arteries and Lesion Findings       LMCA: Normal 0% stenosis. LAD: Normal 0% stenosis. The D1 is normal.     LCx: Normal 0% stenosis. The OM1 is normal. The LPL is normal.     RCA: Normal 0% stenosis. Coronary Tree        Dominance: Right       LV Analysis   LV function assessed as:Normal.   Ejection Fraction   +----------------------------------------------------------------------+---+   ! Method                                                                ! EF%! +----------------------------------------------------------------------+---+   ! LV gram                                                               !55 !   +----------------------------------------------------------------------+---+      LV Segment Contractility        1 - Normal    3 - Mild         5 - Severe       7 - Dyskinesis    hypokinesis      hypokinesis        2 -           4 - Moderate     6 - Akinesis     8 - Aneurysm    Hypokinesis   hypokinesis         Estimated Blood Loss: 10 mL     Conclusions        Procedure Summary        Normal coronary artery disease. Normal LV systolic function; LVEF 54%. Recommendations        Medical therapy as needed. Risk factor modification. Routine Post Diagnostic Cath orders. ____________________________________________________________________    History and Risk Factors    [] Hypertension     [] Family history of CAD  [] Hyperlipidemia     [] Cerebrovascular Disease   [] Prior MI       [] Peripheral Vascular disease   [] Prior PCI              [] Diabetes Mellitus    [] Left Main PCI. [] Currently on Dialysis. [] Prior CABG. [] Currently smoker. [] Cardiac Arrest outside of healthcare facility. [] Yes    [x] No        Witnessed     [] Yes   [] No     Arrest after arrival of EMS  [] Yes   [] No     [] Cardiac Arrest at other Facility. [] Yes   [x] No    Pre-Procedure Information. Heart Failure       [x] Yes    [] No        Class  [x] I      [] II  [] III    [] IV. New Diagnosis    [] Yes  [x] No    HF Type      [x] Systolic   [] Diastolic          [] Unknown. Diagnostic Test:   EKG       [x] Normal   [] Abnormal    New antiarrhythmia medications:    [] Yes   [x] No   New onset atrial fibrillation / Flutter     [] Yes   [x] No   ECG Abnormalities:      [] V. Fib   [] Madalyn V. Tach           [] NS V. T   [] New LBBB           [] T.  Inv  []  ST dev > 0.5 mm         [] PVC's freq  [] PVC's infrequent    Stress Test Performed: [x] Yes    [] No     Type:     [] Stress Echo   [] Exercise Stress Test (no imaging)      [x] Stress Nuclear  [] Stress Imaging     Results   [] Negative   [x] Positive        [] Indeterminate  [] Unavailable     If Positive/ Risk / Extent of Ischemia:       [] Low  [x] Intermediate         [] High  [] Unavailable      Cardiac CTA Performed:     [] Yes    [x] No      Results   [] CAD   [] Non obstructive CAD      [] No CAD   [] Uncertain      [] Unknown   [] Structural Disease. Pre Procedure Medications:   [] Yes    [x] No         [] ASA   [] Beta Blockers      [] Nitrate   [] Ca Channel Blockers      [] Ranolazine   [] Statin       [] Plavix/Others antiplatelets      Electronically signed on 1/17/2023 at 11:07 AM by:    Keny Gurrola MD  Fellow, [address] [place] [address]    I was present during entire procedure and performed all critical elements of the procedure.     Barb Clarke MD

## 2023-02-09 ENCOUNTER — OFFICE VISIT (OUTPATIENT)
Dept: BEHAVIORAL/MENTAL HEALTH | Age: 73
End: 2023-02-09
Payer: MEDICARE

## 2023-02-09 DIAGNOSIS — F43.23 ADJUSTMENT DISORDER WITH MIXED ANXIETY AND DEPRESSED MOOD: Primary | ICD-10-CM

## 2023-02-09 PROCEDURE — 1123F ACP DISCUSS/DSCN MKR DOCD: CPT | Performed by: COUNSELOR

## 2023-02-09 PROCEDURE — 90791 PSYCH DIAGNOSTIC EVALUATION: CPT | Performed by: COUNSELOR

## 2023-02-09 NOTE — PROGRESS NOTES
Behavioral Health Consultation/Psychotherapy Note  Sandra Whyte. Germania Doan Psy.D. Visit Date:  2/9/2023    Patient:  Stephon Castro  YOB: 1950  Chief Complaint:  Stress    Duration of session:  70 minutes      S:       Patient presented alone for appointment and engaged readily. Patient reviewed referral information provided by referring provider and confirmed contents. Patient provided additional information to clarify and/or elaborate upon provided referral info. Patient indicated no significant changes in context since last contact. Patient reported continued difficulties with elevated situational stress and adjustment to significant family circumstances, as well as symptoms of mood disruption and anxiety. Patient reviewed psychoeducational content associated with topics of session as appropriate, including information on gender affirmation treatment and review of basic principles of self-care and illness management/recovery. Patient discussed practice of previously reviewed skills, including review of prior treatment efforts. Patient engaged in thought challenging and cognitive restructuring tasks as needed. Patient reviewed recent use of self-care and active coping strategies and discussed areas for potential adjustments which might better suit patient's behavioral needs. Patient discussed current treatment needs and reviewed available options.      Topic areas discussed during session:  Specific discussion of new / existing symptoms  Evaluation for differential diagnosis  Family stress  Behavioral skill-building and/or practice  Identification of resources      O:    Appearance    Patient presents as alert, oriented, and cooperative  Appetite reduced  Sleep disturbance Yes  Loss of pleasure Yes  Speech    clear and understandable  Mood    Depressed  Affect    depressed affect  Thought Process    linear and coherent  Insight    Good  Judgment    Intact  Memory    recent and remote memory intact  Suicide Assessment    no suicidal ideation      A:    1. Adjustment disorder with mixed anxiety and depressed mood        Patient presents for consultation regarding symptoms of mood disruption and anxiety associated with ongoing elevations of stress within her family. Patient reports elevated stress levels within family / medical / spiritual environment(s) with moderate associated increase of symptom severity. Patient diagnosis has been updated to account for information gathered during today's contact. Patient has been encouraged to continue regular use of self-care and active coping strategies as reviewed. Patient has been recommended to continue using previously provided information on CBT and relaxation strategies, to maintain medication compliance, to follow up with medical providers as directed, and to follow up with behavioral health as needed. Patient is in agreement with this plan and has been scheduled for follow-up. Patient is aware that they are able to reach out as needed for additional information or support prior to the next scheduled contact.       P:    Discussed various factors related to the development and maintenance of  depression, anxiety, and stress (including biological, cognitive, behavioral, and environmental factors), Trained in strategies for increasing balanced thinking, Trained in relaxation strategies, Discussed self-care (sleep, nutrition, rewarding activities, social support, exercise), Discussed and problem-solved barriers in adhering to behavioral change plan, Motivational Interviewing to increase patient confidence and compliance with adhering to behavioral change plan, Motivational Interviewing to determine importance and readiness for change, Discussed potential barriers to change, Established rapport, Conducted functional assessment, [place]-setting to identify pt's primary goals for [facility] [place] visit / overall health, Supportive techniques, Provided Psychoeducation re: gender-affirmative treatments, and CBT to target cognitive distortions    Patient Plan:  1) Review the attached information on sleep hygiene. Go through the worksheet at the end of it; are there things that make sense to try right now? If so, do them. 2) Make sure to eat regularly. If you haven't eaten in 3-4 hours, grab at least a small snack, even if you have no appetite. If your stomach is talking to you, listen. 3) Aim for at least 30-40 cumulative minutes of movement each day. This doesn't have to be all at once, or a workout. Just stretch or dance to a song you like. If you've been sitting for more than an hour or two, get up and move around for a few minutes. 4) Review the attached list of coping skills. Try 1-2 a week; do more if you're feeling frisky! 5) Check out the attached info on grounding techniques. These can be used to disrupt the hamster when it gets overly lively. The relief will only be temporary, so make sure that you follow up with something relaxing and/or distracting to prolong the relief you may get. 6) Review the attached information on deep breathing and relaxation. Use this to help manage intense emotions, or just to relax. Practice this daily, even if you're not feeling particularly stressed. 7) Are there things that you've stopped doing because of stress or your mood? If so, make note of them and consider some ways to reincorporate them into your life. This is also something that can be discussed in counseling. 8) Remember to be kind to yourself. This is a challenging experience, and you're doing the best you can. Patient to return in 3-4 week(s) for follow-up. All questions about treatment plan answered. Patient instructed to call the crisis line and/or proceed to emergency room if suicidal or homicidal ideations occur outside of clinic hours and crisis management skills do not provide relief.  Patient stated understanding and is agreeable to treatment and crisis plan.    (Please note that portions of this note were completed with a voice recognition program. Efforts were made to edit the dictations but occasionally words are mis-transcribed.)    Provider Signature:  Electronically signed by Mickey Atkinson PSYD on 2/9/2023 at 10:14 AM

## 2023-02-09 NOTE — PSYCHOTHERAPY
Daughter came out as trans at end of July, currently transitioning. Has already had mastectomy at beginning of September. Had to stay for a week because was in [address] Had complications with arm, had to go to ER. Had known something was going on for a while; daughter has always been at home aside from five years living with a friend. (Daughter is 37 y/o.) Was stunned when heard, couldn't discuss further at the time. When tried to talk a few days later, was told that pt was for or against and that transition would be happening anyway. Had taken in daughter's female friend while daughter was in college; found the two kissing one day. Daughter denied being lesbian when asked, left home for a while after. Daughter told her to stop saying that she loved daughter, which hurt. Daughter came back home in  after pt's  had heart attack; found brain tumor subsequently,   shortly after. Daughter has lived at home ever since. Had traveled with daughter, talked regularly about work and life circumstances. Nothing ever came up, daughter never let on that something was wrong. Feeling blindsided, hurt that daughter had lied -- daughter had been on hormones for a while already, seeing doctors, etc., and had been saying that she was going for work or interviews, etc. Recently, having consults for bottom surgery, despite saying repeatedly in writing after top surgery that wouldn't be doing bottom surgery. Daughter has changed Social Security, ID, etc already. Does have different name Inell Memos), but pt struggling to use it; has known daughter as Marc Womack for entire life. Daughter not communicating much, mainly home for meals and sleep, staying at work a lot. Not looking after animals as much, used to do often. Concerned about daughter's salvation; Bible doesn't talk about trans specifically, but concerned because daughter has been cutting on body and changing form given by nature / God. Participating in [employer] [address] group. Does eat, but appetite is down, has lost 25-30 pounds. Walks dog twice daily if weather allows, makes sure house is clean, cooks. Doesn't feel like going many places. Visits son when he's home, but this isn't very often.

## 2023-02-09 NOTE — PATIENT INSTRUCTIONS
1) Review the attached information on sleep hygiene. Go through the worksheet at the end of it; are there things that make sense to try right now? If so, do them. 2) Make sure to eat regularly. If you haven't eaten in 3-4 hours, grab at least a small snack, even if you have no appetite. If your stomach is talking to you, listen. 3) Aim for at least 30-40 cumulative minutes of movement each day. This doesn't have to be all at once, or a workout. Just stretch or dance to a song you like. If you've been sitting for more than an hour or two, get up and move around for a few minutes. 4) Review the attached list of coping skills. Try 1-2 a week; do more if you're feeling frisky! 5) Check out the attached info on grounding techniques. These can be used to disrupt the hamster when it gets overly lively. The relief will only be temporary, so make sure that you follow up with something relaxing and/or distracting to prolong the relief you may get. 6) Review the attached information on deep breathing and relaxation. Use this to help manage intense emotions, or just to relax. Practice this daily, even if you're not feeling particularly stressed. 7) Are there things that you've stopped doing because of stress or your mood? If so, make note of them and consider some ways to reincorporate them into your life. This is also something that can be discussed in counseling. 8) Remember to be kind to yourself. This is a challenging experience, and you're doing the best you can. Relaxation:  Diaphragmatic Breathing             _____________________________________________________________________________    1. Sit in a comfortable position    2. Place one hand on your stomach and the other on your chest    3. Try to breathe so that only your stomach rises and falls    As you inhale, concentrate on your chest remaining relatively still while your stomach rises.   It may be helpful for you to imagine that your pants are too big and you need to push your stomach out to hold them up. When exhaling, allow your stomach to fall in and the air to fully escape. Inhale slowly. You may choose to hold the air in for about a second. Exhale slowly. Dont push the air out, but just let the natural pressure of your body slowly move it out. It is normal for this healthy method of breathing to feel a little awkward at first.  With practice, it will feel more natural.    Get your mind on your side    One other important factor in getting relaxed is your mind. Your mind and body are connected. The mind influences the body and the body influences the mind. What you do with your mind when you are trying to relax is very important. The key is to avoid thinking about stressful things. You can think about      Neutral things (e.g., counting, saying a word like calm or relax)   Pleasant things (e.g., imagining a pleasant place)    It is recommended that you practice 2 times per day, 10 minutes each time. Deep Breathing Exercises      Exercise 1:  The Stimulating Breath (also called the Amina Breath)   Its aim is to raise energy level and increase alertness. Inhale and exhale rapidly through your nose, keeping your mouth closed but relaxed. Your breaths in and out should be equal in duration, but as short as possible. This is a noisy breathing exercise. Try for three in-and-out breath cycles per second. This produces a quick movement of the diaphragm, suggesting a amina. Breathe normally after each cycle. Do not do for more than 15 seconds on your first try. Each time you practice the Stimulating Breath, you can increase your time by five seconds or so, until you reach a full minute. If done properly, you may feel invigorated, comparable to the heightened awareness you feel after a good workout. You should feel the effort at the back of the neck, the diaphragm, the chest and the abdomen.  Try this breathing exercise the next time you need an energy boost and feel yourself reaching for a cup of coffee. Exercise 2:  The 4-7-8 (or Relaxing Breath) Exercise  This exercise is utterly simple, takes almost no time, requires no equipment and can be done anywhere. Although you can do the exercise in any position, sit with your back straight while learning the exercise. Place the tip of your tongue against the ridge of tissue just behind your upper front teeth, and keep it there through the entire exercise. You will be exhaling through your mouth around your tongue; try pursing your lips slightly if this seems awkward. Exhale completely through your mouth, making a whoosh sound. Close your mouth and inhale quietly through your nose to a mental count of four. Hold your breath for a count of seven. Exhale completely through your mouth, making a whoosh sound to a count of eight. This is one breath. Now inhale again and repeat the cycle three more times for a total of four breaths. Note that you always inhale quietly through your nose and exhale audibly through your mouth. The tip of your tongue stays in position the whole time. Exhalation takes twice as long as inhalation. The absolute time you spend on each phase is not important; the ratio of 4:7:8 is important. If you have trouble holding your breath, speed the exercise up but keep to the ratio of 4:7:8 for the three phases. With practice you can slow it all down and get used to inhaling and exhaling more and more deeply. This exercise is a natural tranquilizer for the nervous system. Unlike tranquilizing drugs, which are often effective when you first take them but then lose their power over time, this exercise is subtle when you first try it but gains in power with repetition and practice. Do it at least twice a day. You cannot do it too frequently. Do NOT do more than 4 breaths at one time for the first month of practice. Later, if you wish, you can extend it to eight breaths. If you feel a little lightheaded when you first breathe this way, do not be concerned; it will pass. Once you develop this technique by practicing it every day, it will be a very useful tool that you will always have with you. Use it whenever anything upsetting happens - before you react. Use it whenever you are aware of internal tension. Use it to help you fall asleep. This exercise cannot be recommended too highly. Everyone can benefit from it. Exercise 3: Meditation exercise  Breath Counting  If you want to get a feel for this challenging work, try your hand at breath counting, a deceptively simple technique. Sit in a comfortable position with the spine straight and head inclined slightly forward. Gently close your eyes and take a few deep breaths. Then let the breath come naturally without trying to influence it. Ideally it will be quiet and slow, but depth and rhythm may vary. To begin the exercise, count \"one\" to yourself as you exhale. The next time you exhale, count \"two,\" and so on up to Vern. \"  Then begin a new cycle, counting \"one\" on the next exhalation. Never count higher than \"five,\" and count only when you exhale. You will know your attention has wandered when you find yourself up to \"eight,\" \"12,\" even \"19. \"  Try to do 10 minutes of this form of meditation. DETACHING FROM EMOTIONAL PAIN (GROUNDING)   Xander Garcia, PhD     Saumya Carrasco? Grounding is a set of simple strategies to detach from emotional pain (for example, drug cravings, self-harm impulses, anger, sadness). Distraction works by focusing outward on the external world--rather than inward toward the self. You can also think of it as distraction, centering, a safe place, looking outward, or healthy detachment.      WHY DO GROUNDING? When you are overwhelmed with emotional pain, you need a way to detach so that you can gain control over your feelings and stay safe.  As long as you are grounding, you cannot possibly use substances or hurt yourself. Grounding anchors you to the present and to reality. Many people with PTSD and substance abuse struggle with either feeling too much (overwhelming emotions and memories) or too little (numbing and dissociation). In grounding, you attain balance between the two -- conscious of reality and able to tolerate it. GUIDELINES     Grounding can be done any time, any place, anywhere and no one has to know. Use grounding when you are: faced with a trigger, having a flashback, dissociating, having a substance craving, or when your emotional pain goes above 6 (on a 0-10 scale). Grounding puts healthy distance between you and these negative feelings. Keep your eyes open, scan the room, and turn the light on to stay in touch with the present. Rate your mood before and after to test whether it worked. Before grounding, rate your level of emotional pain (0-10, where 10 means extreme pain). Then re-rate it afterwards. Has it gone down? No talking about negative feelings or journal writing. You want to distract away from negative feelings, not get in touch with them. Stay neutral--no judgments of good and bad. For example, The walls are blue; I dislike blue because it reminds me of depression.  Simply say The wafts are blue and move on. Focus on the present, not the past or future. Note that grounding is not the same as relaxation training. Grounding is much more active, focuses on distraction strategies, and is intended to help extreme negative feelings. It to believed to be more effective for PTSD than relaxation training. WAYS TO GROUND     Mental Grounding     ? Describe your environment in detail using all your senses. For example, The walls are white; there are five pink chairs, there is a wooden bookshelf against the doreen. .. Describe objects, sounds, textures, colors, smells, shapes, numbers and temperature. You can do this anywhere. For example, on the subway: Im on the subway. Ill see the river soon. Those are the windows. This is the bench. The metal bar is silver. The subway map has four colors. ..   ? Play a Actito game with yourself. Try to think of types of dogs, Roena Alpers musicians, states that begin with A, cars, TV shows, writers, sports, [employer] [place],  cities.    ? Do an age progression. If you have regressed to a younger age (e.g., 7years old), you can slowly work your way backup (e.g., Im now 10; Im now 11; Im now 12. ..) until you are back to your current age. ? Describe an everyday activity in great detail. For example, describe a meal that you cook (e.g., First I peel the potatoes and cut them into quarters, then I boil the water, I make an herb marinade of oregano, basil, garlic, and olive oil. Sneha Andrea ). ? Imagine. Use an image: Glide along on skates away from your pain; change the TV channel to a better show think of a wall as a buffer between you and your pain. ? Say a safety statement. [facility] name is _________; I am safe right now. I am in the present, not the past. I am located in _____________ the date is _____________. ? Read something, saying each word to yourself. Or read each letter backwards so that you focus or the letters and not on the meaning of words. ? Use humor. Think of something funny to jolt yourself out of your mood. ? Count to 10 or say the alphabet very s.. l..o..w..l. Sneha patiño. ? Repeat a favorite saying to yourself over and over (e.g., the Serenity Prayer). Physical Grounding     ? Run cool or warm water over your hands. ?  Grab tightly onto your chair as hard as you can.   ? Touch various objects around you: a pen. keys, your clothing, the table, the walls. Notice textures, colors, materials, weight, temperature. Compare objects you touch: Is one colder? Lighter? ?  Dip your heels into the floor-- literally grounding them!  Notice the tension centered in your heels as you do this. Remind yourself that you are connected to the ground. ?  Carry a ground object in your pocket--a small object (a small rock, maría, ring, piece of cloth or yarn) that you can touch whenever you feel triggered. ?  Jump up and down. ? Notice your body: The weight of your body in the chair; wiggling your toes in your socks; the feel of your back against the chair. You are connected to the world. ?  Stretch. Extend your fingers, arms or legs as far as you can; roll your head around. ?  Walk slowly, noticing each footstep, saying left, right with each step. ?  Eat something. Describe the flavors in detail to yourself. ?  Focus on your breathing. Noticing each inhale and exhale. Repeat a pleasant word to yourself on each inhale (for example, a favorite, color or a soothing word such as safe or easy). Soothing Grounding     ? Say kind statements, as if you were talking to a small child. E.g.. You are a good person going through a hard time. Shalini Quale get through this. ? Think of favorites. Think of your favorite color, animal, season, food, time of day, TV show. ? Picture people you care about (e.g., your children; and look at photographs of them). ? Remember the words to an inspiring song, quotation or poem that makes you feel better (e.g.. the Serenity Prayer). ? Remember a safe place. Describe a place that you find very soothing (perhaps the beach or mountains, or a favorite room); focus on everything about that place--the sounds, colors, shapes, objects, textures. ? Say a coping statement. I can handle this, This feeling will pass.    ? Plan out a safe treat for yourself, such as a piece of candy, a nice dinner, or a warm bath. ? Think of things you are looking forward to in the next week. Perhaps time with a friend or going to a movie. WHAT IF GROUNDING DOESNT WORK?     ? Practice as often as possible.  Even when you dont need it, so that youll know it by heart. ? Practice faster. Speeding up the pace gets you focused on the outside world quickly. ? Try grounding for a Iooooooonnnng time (20-30 minutes). And, repeat, repeat, repeat. ? Try to notice whether you do better with physical or mental grounding. ? Create your own methods of grounding. Any method you make up may be worth much more than those you read here because it is yours. ? Start grounding early in a negative mood cycle. Sleep Hygiene Guidelines    Good dental hygiene is important in determining the health of your teeth and gums. We all know we are supposed to brush and floss regularly. Those who do so are more likely to have strong, healthy gums and less cavities. Similarly, good sleep hygiene is important in determining the quality and quantity of your sleep. Below are guidelines for good sleep hygiene practices. Review these guidelines and evaluate how well you practice good sleep hygiene. Caffeine:  Avoid Caffeine 6-8 Hours Before Bedtime       Caffeine disturbs sleep, even in people who do not think they experience a stimulation effect. Individuals with insomnia are often more sensitive to mild stimulants than are normal sleepers. Caffeine is found in items such as coffee, tea, soda, chocolate, and many over-the-counter medications (e.g., Excedrin). Thus, drinking caffeinated beverages should be avoided near bedtime and during the night. You might consider a trial period of no caffeine if you tend to be sensitive to its effects. Nicotine:  Avoid Nicotine Before Bedtime       Although some smokers claim that smoking helps them relax, but nicotine is a stimulant. The initial relaxing effects occur with the initial entry of the nicotine, but as the nicotine builds in the system it produces an effect similar to caffeine. Thus, smoking, dipping, or chewing tobacco should be avoided near bedtime and during the night.   Dont smoke to get yourself back to sleep. Alcohol:  Avoid Alcohol After Dinner       Alcohol often promotes the onset of sleep, but as alcohol is metabolized sleep becomes disturbed and fragmented. Thus, a large amount of alcohol is a poor sleep aid and should not be used as such. Limit alcohol use to small quantities to moderate quantities. Sleeping Pills:  Sleep Medications are Effective Only Temporarily       Scientists have shown that sleep medications lose their effectiveness in about 2 - 4 weeks when taken regularly. Despite advertisements to the contrary, over-the-counter sleeping aids have little impact on sleep beyond the placebo effect. Over time, sleeping pills actually can make sleep problems worse. When sleeping pills have been used for a long period, withdrawal from the medication can lead to an insomnia rebound. Thus, after long-term use, many individuals incorrectly conclude that they need sleeping pills in order to sleep normally. Keep use of sleep pills infrequent, but dont worry if you need t use one on an occasional basis. Regular Exercise       Get regular exercise, preferably 40 minutes each day of an activity that causes sweating. .  Exercise in the late afternoon or early evening seems to aid sleep, although the positive effect often takes several weeks to become noticeable. Exercising sporadically is not likely to improve sleep, and exercise within 2 hours of bedtime may elevate nervous system activity and interfere with sleep onset. Hot Baths  Spending 20 minutes in a tub of hot water an hour or two prior to bedtime may promote sleep and is strongly recommended. Bedroom Environment: Moderate Temperature, Quiet, and Dark       Extremes of heat or cold can disrupt sleep. A quiet environment is more sleep promoting than a noisy one. Noises can be masked with background white noise (such as the noise of a fan) or with earplugs.   Bedrooms may be darkened with black-out shades or sleep masks can be worn. Position clocks out-of-sight since clock-watching can increase worry about the effects of lack of sleep. Be sure your mattress is not too soft or too firm and that your pillow is the right height and firmness. Eating       A light bedtime snack, such a glass of warm milk, cheese, or a bowl of cereal can promote sleep. You should avoid the following foods at bedtime:  any caffeinated foods (e.g., chocolate), peanuts, beans, most raw fruits and vegetables (since they may cause gas), and high-fat foods such as potato chips or corn chips. Avoid snacks in the middle of the nights since awakening may become associated with hunger. If you have trouble with regurgitation, be especially careful to avid heavy meals and spices in the evening. Do not go to bed too hungry or too full. It may help to elevate you head with some pillows. Avoid Naps       Avoid naps, the sleep you obtain during the day takes away from you sleep need that night resulting in lighter, more restless sleep, difficulty falling asleep or early morning awakening. If you must nap, keep it brief, and take the nap about 8 hours after arising. It is best to set an alarm to ensure you dont sleep more than 10-15 minutes. Limit Your Time in Bed        Restrict your sleep period to the average number of hours you have actually slept per night during the preceding week. Quality of sleep is important. Too much time in bed can decrease the quality on subsequent night and contribute to the maintenance of existing sleep problems. Dont lay in bed for extended times not sleep. If you arent asleep in about 15-20 minutes go ahead and get up. Do something outside the bedroom that is relaxing. When you feel sleepy (i.e., yawning, head bobbing, eyes closing, concentration decreasing, then return to bed. Dont confuse tiredness with sleepiness, they are different. Tiredness doesnt lead to sleep, only sleepiness does.     Regular Sleep Schedule       Keep a regular time each day, 7 days a week, to get out of bed. Keeping a regular awaking time helps set your circadian rhythm set so that your body learns to sleep at the desired time. Use the attached form to develop a plan for improving you sleep hygiene. It will take time for you sleep to get back in line so once you begin your sleep hygiene plan, stick with if for at least 6-8 weeks. Planned Improvements of My Sleep Hygiene    Check Those  That Apply  _____ Avoid Caffeine 6-8 Hours Before Bedtime. I will not have caffeine after ________ PM.    ____ Avoid Nicotine Before Bedtime. I will not have a cigarette after _________ PM.    ______  Limit Alcohol Use. I will not have more than _______ drinks in the evening.    ______ Avoid Use of Sleeping Pills. (If you are currently using them regularly, all changes should be   medical supervised by your medical provider). ______ Do Exercise Regularly, But Not Within 2 Hours of Bedtime. I ________________ for ____   minutes, on the following days ____________________________________________________    ______ Ensure your Bedroom is a Comfortable Temperature, Quiet, and Dark and Your   Mattress and Pillow are good. I will make the  following changes to my bedroom   ____________________________________________________________________________    ______ Do Take a Hot Bath 1-2 Hours Prior to Bedtime. I will take a hot bath about ______ PM.    ______ Eat a Light Snack at Bedtime but Avoid Large or Problematic Foods. I will eat     __________________  or _____________________ or __________________ before bed.    ______ Avoid Naps. I try not to nap, if I must, I will limit it to _______ minutes, about 8 hours after I   awoke and will use alarm to limit my nap time. ______ Limit Time In Bed. I have been sleeping on average ______ hours per night, therefore I will   limit my time in bed to _____ hours (the same number).   If Im not asleep in about 15 to 20   minutes I will get up and not return to bed until Im sleepy.    ______ Stay on a Regular Sleep Schedule  I will get up at _______ AM, 7 days a week, no matter   how poorly I slept that night. 60+ Essential Positive Coping Skills    There are nearly infinite ways to cope. We all use different methods that suit our unique personalities and needs. You may find that what causes stress in one individual will help another to cope. Its not important whether you cope like everyone else; all that matters is that you find effective coping methods that will help you build resilience and thrive! That being said, dont beat yourself up if you just need a little distraction sometimes. Below, youll find Shady Dupree (2016) master list of coping methods and skills organized into categories. Whatever you need in the moment, there is probably at least one activity mentioned below that will help!     Diversions:  Write, draw, paint, photography   Play an instrument, sing, dance, act   Take a shower or a bath   Garden   Take a walk, or go for a drive   Watch television or a movie   Watch cute kitten videos on [employer] a game   Go shopping   Clean or organize your environment   Read   Take a break or vacation    Social/Interpersonal Coping:  Talk to someone you trust   Set boundaries and say no   Write a note to someone you care about   Be assertive   Use humor  Spend time with friends and/or family   Serve someone in need   Care for or play with a pet   Role-play challenging situations with others   Encourage others    Cognitive Coping:  Make a gratitude list  Brainstorm solutions   Lower your expectations of the situation   Keep an inspirational quote with you   Be flexible   Write a list of goals   Take a class Act opposite of negative feelings   Write a list of pros and cons for decisions   Reward or pamper yourself when successful   Write a list of strengths   Accept a challenge with a positive attitude    Tension Releasers:  Exercise or play sports   Catharsis (yelling in the bathroom, punching a punching bag)   Cry   Laugh    Physical:  Get enough sleep   Eat healthy foods   Get into a good routine   Eat a little chocolate   Limit caffeine   Deep/slow breathing    Spiritual:  [place] or meditate   Enjoy nature   Get involved in a worthy cause    Limit Setting:  Drop some involvement   Prioritize important tasks   Use assertive communication   Schedule time for yourself    The 2021 Mario Valadez ([employer]) website also lists some coping skills, some that are positive and encourage mental health, and others that are destructive or used to avoid your problems.     The positive coping skills include:   Meditation and relaxation techniques  Having time to yourself   Physical activity or exercise   Reading   Spending time with friends   Finding humor   Spending time on your hobbies   Spirituality   Quality time with your pets   Getting a good nights sleep   Eating healthy    Negative coping skills include:  Drugs   Excessive alcohol   Self-mutilation   Ignoring or bottling up feelings   Taking sedatives   Taking stimulants   Working too much   Avoiding your problems   Denial    Aside from using the positive coping methods, the [facility] website also suggests ten tips you can put to use to strengthen your mental state and build resilience to lifes stressors:   Build up your confidence  Accept compliments when they are given to you   Be sure to make time for your loved ones   Give support to others when needed and accept support from others when needed   Create and stick to a realistic budget   Volunteer in your community   Find ways to manage your stress on a regular basis   Share your burdens with others, especially those who have been through the same things   Identify and address your shifting moods   Learn how to be at peace with yourself    While none of these tips are easy to follow, they are sure to provide you with the strength and resilience you need to navigate difficult times in your life ([facility]).

## 2023-02-10 ENCOUNTER — OFFICE VISIT (OUTPATIENT)
Dept: CARDIOLOGY | Age: 73
End: 2023-02-10
Payer: MEDICARE

## 2023-02-10 VITALS
SYSTOLIC BLOOD PRESSURE: 122 MMHG | WEIGHT: 136 LBS | HEART RATE: 66 BPM | BODY MASS INDEX: 22.63 KG/M2 | DIASTOLIC BLOOD PRESSURE: 64 MMHG

## 2023-02-10 DIAGNOSIS — R06.02 SOB (SHORTNESS OF BREATH): ICD-10-CM

## 2023-02-10 DIAGNOSIS — I50.22 CHRONIC SYSTOLIC (CONGESTIVE) HEART FAILURE (HCC): Primary | ICD-10-CM

## 2023-02-10 PROCEDURE — 93010 ELECTROCARDIOGRAM REPORT: CPT | Performed by: SURGERY

## 2023-02-10 PROCEDURE — 99213 OFFICE O/P EST LOW 20 MIN: CPT | Performed by: SURGERY

## 2023-02-10 PROCEDURE — 99212 OFFICE O/P EST SF 10 MIN: CPT | Performed by: SURGERY

## 2023-02-10 PROCEDURE — 1123F ACP DISCUSS/DSCN MKR DOCD: CPT | Performed by: SURGERY

## 2023-02-10 PROCEDURE — 93005 ELECTROCARDIOGRAM TRACING: CPT | Performed by: SURGERY

## 2023-02-13 DIAGNOSIS — K21.9 GASTROESOPHAGEAL REFLUX DISEASE WITHOUT ESOPHAGITIS: ICD-10-CM

## 2023-02-13 RX ORDER — OMEPRAZOLE 20 MG/1
CAPSULE, DELAYED RELEASE ORAL
Qty: 90 CAPSULE | Refills: 3 | Status: SHIPPED | OUTPATIENT
Start: 2023-02-13

## 2023-06-20 ENCOUNTER — OFFICE VISIT (OUTPATIENT)
Dept: OBGYN | Age: 73
End: 2023-06-20
Payer: MEDICARE

## 2023-06-20 ENCOUNTER — HOSPITAL ENCOUNTER (OUTPATIENT)
Dept: MAMMOGRAPHY | Age: 73
Discharge: HOME | End: 2023-06-22
Payer: MEDICARE

## 2023-06-20 VITALS
WEIGHT: 127.8 LBS | HEIGHT: 66 IN | SYSTOLIC BLOOD PRESSURE: 120 MMHG | DIASTOLIC BLOOD PRESSURE: 70 MMHG | BODY MASS INDEX: 20.54 KG/M2 | HEART RATE: 55 BPM | OXYGEN SATURATION: 97 %

## 2023-06-20 DIAGNOSIS — N89.8 VAGINAL DISCHARGE: ICD-10-CM

## 2023-06-20 DIAGNOSIS — Z78.0 MENOPAUSE: ICD-10-CM

## 2023-06-20 DIAGNOSIS — Z12.31 VISIT FOR SCREENING MAMMOGRAM: ICD-10-CM

## 2023-06-20 DIAGNOSIS — Z11.3 ROUTINE SCREENING FOR STI (SEXUALLY TRANSMITTED INFECTION): ICD-10-CM

## 2023-06-20 DIAGNOSIS — Z12.4 SCREENING FOR MALIGNANT NEOPLASM OF CERVIX: Primary | ICD-10-CM

## 2023-06-20 DIAGNOSIS — Z12.31 ENCOUNTER FOR SCREENING MAMMOGRAM FOR MALIGNANT NEOPLASM OF BREAST: ICD-10-CM

## 2023-06-20 PROCEDURE — 77063 BREAST TOMOSYNTHESIS BI: CPT

## 2023-06-20 PROCEDURE — 99999 PR OFFICE/OUTPT VISIT,PROCEDURE ONLY: CPT | Performed by: NURSE PRACTITIONER

## 2023-06-20 SDOH — ECONOMIC STABILITY: INCOME INSECURITY: HOW HARD IS IT FOR YOU TO PAY FOR THE VERY BASICS LIKE FOOD, HOUSING, MEDICAL CARE, AND HEATING?: NOT HARD AT ALL

## 2023-06-20 SDOH — ECONOMIC STABILITY: FOOD INSECURITY: WITHIN THE PAST 12 MONTHS, YOU WORRIED THAT YOUR FOOD WOULD RUN OUT BEFORE YOU GOT MONEY TO BUY MORE.: NEVER TRUE

## 2023-06-20 SDOH — ECONOMIC STABILITY: HOUSING INSECURITY
IN THE LAST 12 MONTHS, WAS THERE A TIME WHEN YOU DID NOT HAVE A STEADY PLACE TO SLEEP OR SLEPT IN A SHELTER (INCLUDING NOW)?: NO

## 2023-06-20 SDOH — ECONOMIC STABILITY: FOOD INSECURITY: WITHIN THE PAST 12 MONTHS, THE FOOD YOU BOUGHT JUST DIDN'T LAST AND YOU DIDN'T HAVE MONEY TO GET MORE.: NEVER TRUE

## 2023-06-20 ASSESSMENT — PATIENT HEALTH QUESTIONNAIRE - PHQ9
1. LITTLE INTEREST OR PLEASURE IN DOING THINGS: 3
SUM OF ALL RESPONSES TO PHQ QUESTIONS 1-9: 6
4. FEELING TIRED OR HAVING LITTLE ENERGY: 0
SUM OF ALL RESPONSES TO PHQ QUESTIONS 1-9: 6
3. TROUBLE FALLING OR STAYING ASLEEP: 0
10. IF YOU CHECKED OFF ANY PROBLEMS, HOW DIFFICULT HAVE THESE PROBLEMS MADE IT FOR YOU TO DO YOUR WORK, TAKE CARE OF THINGS AT HOME, OR GET ALONG WITH OTHER PEOPLE: 0
SUM OF ALL RESPONSES TO PHQ QUESTIONS 1-9: 6
5. POOR APPETITE OR OVEREATING: 0
SUM OF ALL RESPONSES TO PHQ9 QUESTIONS 1 & 2: 6
7. TROUBLE CONCENTRATING ON THINGS, SUCH AS READING THE NEWSPAPER OR WATCHING TELEVISION: 0
2. FEELING DOWN, DEPRESSED OR HOPELESS: 3
9. THOUGHTS THAT YOU WOULD BE BETTER OFF DEAD, OR OF HURTING YOURSELF: 0
8. MOVING OR SPEAKING SO SLOWLY THAT OTHER PEOPLE COULD HAVE NOTICED. OR THE OPPOSITE, BEING SO FIGETY OR RESTLESS THAT YOU HAVE BEEN MOVING AROUND A LOT MORE THAN USUAL: 0
6. FEELING BAD ABOUT YOURSELF - OR THAT YOU ARE A FAILURE OR HAVE LET YOURSELF OR YOUR FAMILY DOWN: 0
SUM OF ALL RESPONSES TO PHQ QUESTIONS 1-9: 6

## 2023-06-20 NOTE — PROGRESS NOTES
Subjective:      Patient ID: Bhavani Nice  is a 71 y.o. Rolando Thompson  ,female coming into office regarding   Chief Complaint   Patient presents with    Annual Exam       OB History    Para Term  AB Living   3 3 2     2   SAB IAB Ectopic Molar Multiple Live Births                    # Outcome Date GA Lbr Caesar/2nd Weight Sex Delivery Anes PTL Lv   3 Para     F       2 Term     F Vag-Spont      1 Term     M Vag-Spont          This is a 72 y/o [id number]   female here for her well woman examination. Her last pap smear was in . Her last coitus was 15 years. Algo She has a 38 y/o daughter who lives with her, but recently came out as a transgender male. She had a [place]aste[place] in [place], [place] and plans on having re assignment gyn surgery at [facility]. This has caused major stress between the two. She is postmenopausal and denies vaginal bleeding discharge or atrophy. She's had all of her vaccinations. This year she was having \"stress\", so cardiology did a stress text which she failed, then a cardiac catheter, which was normal.    Past Medical History:   Diagnosis Date    Cyst of ovary 2008    LEFT    Deep vein thrombosis ([address]     Fracture     left pinky finger fx due to fall    H/O heartburn     TUMs as needed    Hx of blood clots     RT LEG-DURRING VEIN STRIPPING    Lumbar radiculopathy     Rib fracture 2015    fell outside while walking dogs-LEFT    Rosacea 2009    CREAM AS NEEDED    Snores     Stress fracture of metatarsal bone of left foot 2020    2nd metatarsal    Wears contact lenses      Review of Systems   Constitutional: Negative. HENT: Negative. Eyes: Negative. Respiratory: Negative. Cardiovascular: Negative. Gastrointestinal: Negative. Endocrine: Negative. Genitourinary: Negative. Musculoskeletal: Negative. Skin: Negative. Allergic/Immunologic: Negative. Neurological: Negative.

## 2024-01-11 ENCOUNTER — OFFICE VISIT (OUTPATIENT)
Dept: INTERNAL MEDICINE | Age: 74
End: 2024-01-11
Payer: MEDICARE

## 2024-01-11 VITALS
HEART RATE: 72 BPM | BODY MASS INDEX: 20.34 KG/M2 | HEIGHT: 66 IN | DIASTOLIC BLOOD PRESSURE: 70 MMHG | SYSTOLIC BLOOD PRESSURE: 126 MMHG | WEIGHT: 126.6 LBS

## 2024-01-11 DIAGNOSIS — M17.11 LOCALIZED OSTEOARTHRITIS OF RIGHT KNEE: ICD-10-CM

## 2024-01-11 DIAGNOSIS — E55.9 VITAMIN D DEFICIENCY: ICD-10-CM

## 2024-01-11 DIAGNOSIS — R63.4 WEIGHT LOSS: ICD-10-CM

## 2024-01-11 DIAGNOSIS — R45.89 DYSPHORIC MOOD: ICD-10-CM

## 2024-01-11 DIAGNOSIS — R23.2 HOT FLASHES: ICD-10-CM

## 2024-01-11 DIAGNOSIS — I50.22 CHRONIC SYSTOLIC (CONGESTIVE) HEART FAILURE (HCC): ICD-10-CM

## 2024-01-11 DIAGNOSIS — F43.29 STRESS AND ADJUSTMENT REACTION: ICD-10-CM

## 2024-01-11 DIAGNOSIS — K21.9 GASTROESOPHAGEAL REFLUX DISEASE WITHOUT ESOPHAGITIS: ICD-10-CM

## 2024-01-11 DIAGNOSIS — Z00.00 MEDICARE ANNUAL WELLNESS VISIT, SUBSEQUENT: Primary | ICD-10-CM

## 2024-01-11 DIAGNOSIS — M17.0 BILATERAL PRIMARY OSTEOARTHRITIS OF KNEE: ICD-10-CM

## 2024-01-11 DIAGNOSIS — L71.9 ROSACEA: ICD-10-CM

## 2024-01-11 PROCEDURE — 99213 OFFICE O/P EST LOW 20 MIN: CPT | Performed by: NURSE PRACTITIONER

## 2024-01-11 RX ORDER — MIRTAZAPINE 7.5 MG/1
7.5 TABLET, FILM COATED ORAL NIGHTLY
Qty: 90 TABLET | Refills: 0 | Status: SHIPPED | OUTPATIENT
Start: 2024-01-11

## 2024-01-11 ASSESSMENT — LIFESTYLE VARIABLES
HOW MANY STANDARD DRINKS CONTAINING ALCOHOL DO YOU HAVE ON A TYPICAL DAY: PATIENT DOES NOT DRINK
HOW OFTEN DO YOU HAVE A DRINK CONTAINING ALCOHOL: NEVER

## 2024-01-11 ASSESSMENT — PATIENT HEALTH QUESTIONNAIRE - PHQ9
SUM OF ALL RESPONSES TO PHQ QUESTIONS 1-9: 0
2. FEELING DOWN, DEPRESSED OR HOPELESS: 0
SUM OF ALL RESPONSES TO PHQ9 QUESTIONS 1 & 2: 0
1. LITTLE INTEREST OR PLEASURE IN DOING THINGS: 0
SUM OF ALL RESPONSES TO PHQ QUESTIONS 1-9: 0

## 2024-01-11 NOTE — PROGRESS NOTES
Medicare Annual Wellness Visit    Joanna Hannah is here for Medicare AWV and Gastroesophageal Reflux (annual)    Assessment & Plan     Recommendations for Preventive Services Due: see orders and patient instructions/AVS.  Recommended screening schedule for the next 5-10 years is provided to the patient in written form: see Patient Instructions/AVS.     No follow-ups on file.     Subjective       Patient's complete Health Risk Assessment and screening values have been reviewed and are found in Flowsheets. The following problems were reviewed today and where indicated follow up appointments were made and/or referrals ordered.    No Positive Risk Factors identified today.                                Objective   Vitals:    01/11/24 1103   BP: 126/70   Site: Left Upper Arm   Position: Sitting   Cuff Size: Large Adult   Pulse: 72   Weight: 57.4 kg (126 lb 9.6 oz)   Height: 1.664 m (5' 5.5\")      Body mass index is 20.75 kg/m².             Allergies   Allergen Reactions    Codeine Nausea And Vomiting    Hydrocodone Nausea And Vomiting    Other Nausea And Vomiting     \"certain types of anesthesia\" per pt     Prior to Visit Medications    Medication Sig Taking? Authorizing Provider   omeprazole (PRILOSEC) 20 MG delayed release capsule Take 1 capsule by mouth once daily Yes Maryanne Yeboah APRN - CNP   acetaminophen (TYLENOL) 500 MG tablet Take 1-2 tabs by mouth nightly prn Yes Chadd Jaime MD   ibuprofen (ADVIL;MOTRIN) 200 MG tablet Take 2 tablets by mouth every 6 hours as needed for Pain Yes Chadd Jaime MD   vitamin D (CHOLECALCIFEROL) 25 MCG (1000 UT) TABS tablet Take 1 tablet by mouth three times a week Yes Chadd Jaime MD   Multiple Vitamin (MULTIVITAMIN ADULT PO) Take 1 tablet by mouth daily Yes Chadd Jaime MD   Calcium 600-200 MG-UNIT TABS Calcium with Vitamin D. Take 1 tablet by mouth every day Except Sunday. Yes Chadd Jaime MD   doxycycline hyclate 
reviewed.   Constitutional:       General: She is not in acute distress.     Appearance: Normal appearance. She is well-developed. She is not diaphoretic.   HENT:      Head: Normocephalic and atraumatic.      Right Ear: External ear normal.      Left Ear: External ear normal.   Eyes:      General:         Right eye: No discharge.         Left eye: No discharge.   Neck:      Trachea: No tracheal deviation.   Cardiovascular:      Rate and Rhythm: Normal rate and regular rhythm.      Heart sounds: Normal heart sounds. No murmur heard.     No friction rub. No gallop.   Pulmonary:      Effort: Pulmonary effort is normal. No respiratory distress.      Breath sounds: Normal breath sounds. No stridor. No wheezing, rhonchi or rales.   Chest:      Chest wall: No tenderness.   Abdominal:      General: Bowel sounds are normal. There is no distension.      Palpations: Abdomen is soft.      Tenderness: There is no abdominal tenderness.   Musculoskeletal:         General: No swelling.      Right lower leg: No edema.      Left lower leg: No edema.   Skin:     General: Skin is warm and dry.      Capillary Refill: Capillary refill takes less than 2 seconds.      Coloration: Skin is not jaundiced or pale.      Findings: No lesion or rash.   Neurological:      General: No focal deficit present.      Mental Status: She is alert and oriented to person, place, and time.      Cranial Nerves: No cranial nerve deficit.      Sensory: No sensory deficit.      Coordination: Coordination normal.   Psychiatric:         Mood and Affect: Mood normal.         Behavior: Behavior normal.         Thought Content: Thought content normal.         Judgment: Judgment normal.       Vitals:    01/11/24 1103   BP: 126/70   Site: Left Upper Arm   Position: Sitting   Cuff Size: Large Adult   Pulse: 72   Weight: 57.4 kg (126 lb 9.6 oz)   Height: 1.664 m (5' 5.5\")       Assessment:  1. Medicare annual wellness visit, subsequent    2. Gastroesophageal reflux

## 2024-01-24 ENCOUNTER — OFFICE VISIT (OUTPATIENT)
Dept: INTERNAL MEDICINE | Age: 74
End: 2024-01-24
Payer: MEDICARE

## 2024-01-24 VITALS
BODY MASS INDEX: 21.15 KG/M2 | SYSTOLIC BLOOD PRESSURE: 124 MMHG | HEART RATE: 60 BPM | WEIGHT: 131.6 LBS | HEIGHT: 66 IN | DIASTOLIC BLOOD PRESSURE: 80 MMHG

## 2024-01-24 DIAGNOSIS — H66.91 RIGHT OTITIS MEDIA, UNSPECIFIED OTITIS MEDIA TYPE: Primary | ICD-10-CM

## 2024-01-24 PROCEDURE — 1123F ACP DISCUSS/DSCN MKR DOCD: CPT | Performed by: NURSE PRACTITIONER

## 2024-01-24 PROCEDURE — 99213 OFFICE O/P EST LOW 20 MIN: CPT | Performed by: NURSE PRACTITIONER

## 2024-01-24 RX ORDER — AZITHROMYCIN 250 MG/1
TABLET, FILM COATED ORAL
Qty: 1 PACKET | Refills: 0 | Status: SHIPPED | OUTPATIENT
Start: 2024-01-24

## 2024-01-24 NOTE — PROGRESS NOTES
01/24/24  Joanna Hannah  1950      Chief Complaint:   1. Right otitis media, unspecified otitis media type        HPI:  Patient comes in with complaints of right ear pain worsening over the last 3 days.  Concerned that it could be her teeth was seen by the dentist they took x-rays states no acute findings.  States sharp stabbing pains at times otherwise dull ache.  Denies any further complaints such as rhinitis fever chills sore throat      Allergies   Allergen Reactions    Codeine Nausea And Vomiting    Hydrocodone Nausea And Vomiting    Other Nausea And Vomiting     \"certain types of anesthesia\" per pt       Past Medical History:   Diagnosis Date    Cyst of ovary 2008    LEFT    Deep vein thrombosis ([facility])     Fracture 2007    left pinky finger fx due to fall    H/O heartburn     TUMs as needed    Hx of blood clots 2012    RT LEG-DURRING VEIN STRIPPING    Lumbar radiculopathy     Rib fracture 03/02/2015    fell outside while walking dogs-LEFT    Rosacea 2009    CREAM AS NEEDED    Snores     Stress fracture of metatarsal bone of left foot 04/2020    2nd metatarsal    Wears contact lenses        Past Surgical History:   Procedure Laterality Date    BACK SURGERY Left 06/21/2017    microdiscectomy L4-5    CARDIAC CATHETERIZATION  01/17/2023    Normal coronary artery disease / DR DOMINGO    DILATION AND CURETTAGE      DILATION AND CURETTAGE OF UTERUS  1980    DILATION AND CURETTAGE OF UTERUS N/A 04/10/2019    D & C HYSTEROSCOPY & polypectomy performed by Carlene Dorado MD at [facility] OR    LAMINECTOMY Left 06/21/2017    MICRODISCECTOMY L4-5, (PRONE), BRYANT TABLE, LIBERTAD FRAME, C-ARM, MICROSCOPE performed by Ana Coleman MD at [facility] OR    OTHER SURGICAL HISTORY Right 1981    Removal of birth [place].-RT AXILLIA    CO COLONOSCOPY FLX DX W/COLLJ SPEC WHEN PFRMD N/A 07/16/2018    COLONOSCOPY performed by Fransisco Persaud MD at [facility] OR    TOTAL KNEE ARTHROPLASTY Right 01/31/2022    Dr. Gilbert    TUBAL LIGATION  1982

## 2024-02-14 ENCOUNTER — OFFICE VISIT (OUTPATIENT)
Dept: CARDIOLOGY | Age: 74
End: 2024-02-14
Payer: MEDICARE

## 2024-02-14 VITALS
WEIGHT: 128.6 LBS | DIASTOLIC BLOOD PRESSURE: 70 MMHG | BODY MASS INDEX: 21.07 KG/M2 | SYSTOLIC BLOOD PRESSURE: 122 MMHG | HEART RATE: 60 BPM

## 2024-02-14 DIAGNOSIS — I10 HYPERTENSION, UNSPECIFIED TYPE: ICD-10-CM

## 2024-02-14 DIAGNOSIS — I50.22 CHRONIC SYSTOLIC (CONGESTIVE) HEART FAILURE (HCC): ICD-10-CM

## 2024-02-14 DIAGNOSIS — R06.02 SOB (SHORTNESS OF BREATH): Primary | ICD-10-CM

## 2024-02-14 PROCEDURE — 1123F ACP DISCUSS/DSCN MKR DOCD: CPT

## 2024-02-14 PROCEDURE — 93005 ELECTROCARDIOGRAM TRACING: CPT

## 2024-02-14 PROCEDURE — 99214 OFFICE O/P EST MOD 30 MIN: CPT

## 2024-02-14 PROCEDURE — 3074F SYST BP LT 130 MM HG: CPT

## 2024-02-14 PROCEDURE — 3078F DIAST BP <80 MM HG: CPT

## 2024-02-14 PROCEDURE — 93010 ELECTROCARDIOGRAM REPORT: CPT

## 2024-02-14 PROCEDURE — 99212 OFFICE O/P EST SF 10 MIN: CPT

## 2024-02-14 NOTE — PROGRESS NOTES
or pruritis.  Neurological: No headache, diplopia, change in muscle strength, numbness or tingling. No change in gait, balance, coordination, mood, affect, memory, mentation, behavior.  Psychiatric: No anxiety, or depression.  Endocrine: No temperature intolerance. No excessive thirst, fluid intake, or urination. No tremor.  Hematologic/Lymphatic: No abnormal bruising or bleeding, blood clots or swollen lymph nodes.  Allergic/Immunologic: No nasal congestion or hives.    Physical Exam:  [facility] 06/20/2007 (Approximate)     Constitutional and General Appearance: alert, cooperative, no distress and appears stated age  HEENT: PERRL, no cervical lymphadenopathy. No masses palpable. Normal oral mucosa  Respiratory:  Normal excursion and expansion without use of accessory muscles  Resp Auscultation: Good respiratory effort. No for increased work of breathing. On auscultation: clear to auscultation bilaterally  Cardiovascular:  The apical impulse is not displaced  Heart tones are crisp and normal. regular S1 and S2.  Jugular venous pulsation Normal  The carotid upstroke is normal in amplitude and contour without delay or bruit  Peripheral pulses are symmetrical and full   Abdomen:   No masses or tenderness  Bowel sounds present  Extremities:   No Cyanosis or Clubbing   Lower extremity edema: No   Skin: Warm and dry  Neurological:  Alert and oriented.  Moves all extremities well  No abnormalities of mood, affect, memory, mentation, or behavior are noted       Cardiac Data:  EKG: Sinus Rhythm   -Left bundle branch block.        ECHO Summary  Normal left ventricular diameter.  Left ventricular systolic function is low normal.  Left ventricular ejection fraction 45-50 %.  Grade I (mild) left ventricular diastolic dysfunction.  No significant valvular regurgitation or stenosis seen.  No pericardial effusion.     Signature  ----------------------------------------------------------------------------   Electronically signed by

## 2024-02-15 DIAGNOSIS — K21.9 GASTROESOPHAGEAL REFLUX DISEASE WITHOUT ESOPHAGITIS: ICD-10-CM

## 2024-02-15 RX ORDER — OMEPRAZOLE 20 MG/1
CAPSULE, DELAYED RELEASE ORAL
Qty: 90 CAPSULE | Refills: 3 | Status: SHIPPED | OUTPATIENT
Start: 2024-02-15

## 2024-02-15 RX ORDER — OMEPRAZOLE 20 MG/1
20 CAPSULE, DELAYED RELEASE ORAL DAILY
Qty: 90 CAPSULE | Refills: 3 | Status: CANCELLED | OUTPATIENT
Start: 2024-02-15

## 2024-06-21 ENCOUNTER — HOSPITAL ENCOUNTER (OUTPATIENT)
Dept: MAMMOGRAPHY | Age: 74
End: 2024-06-21
Payer: MEDICARE

## 2024-06-21 VITALS — HEIGHT: 65 IN | BODY MASS INDEX: 21.66 KG/M2 | WEIGHT: 130 LBS

## 2024-06-21 DIAGNOSIS — Z12.31 ENCOUNTER FOR SCREENING MAMMOGRAM FOR MALIGNANT NEOPLASM OF BREAST: ICD-10-CM

## 2024-06-21 PROCEDURE — 77063 BREAST TOMOSYNTHESIS BI: CPT

## 2024-06-25 NOTE — PROGRESS NOTES
03/01/22  Yarelis s  1950      Chief Complaint:   1. Localized osteoarthritis of right knee    2. Vitamin D deficiency    3. Rosacea    4. Gastroesophageal reflux disease without esophagitis        HPI: Pleasant 70-year-old patient in for follow-up from having right total knee arthroplasty. States initially was having significant amount of pain followed with orthopedics and was noted she was not taking very much pain medication. She has now weaned herself down to 1 to 2 tablets daily. States especially takes 1 be for physical therapy. Feels pain is improving but states it will be a long time before somebody convince her to do her left knee. Remainder of chronic health conditions have been stable. She denies any chest pain shortness of breath lightheadedness or dizziness. Has Job stockings to bilateral lower extremities. No swelling calf pain.     Allergies   Allergen Reactions    Codeine Nausea And Vomiting    Hydrocodone Nausea And Vomiting    Other Nausea And Vomiting     \"certain types of anesthesia\" per pt       Past Medical History:   Diagnosis Date    Cyst of ovary 2008    LEFT    Fracture 2007    left pinky finger fx due to fall    H/O heartburn     TUMs as needed    Hx of blood clots 2012    RT LEG-DURRING VEIN STRIPPING    Lumbar radiculopathy     Rib fracture 03/02/2015    fell outside while walking dogs-LEFT    Rosacea 2009    CREAM AS NEEDED    Snores     Stress fracture of metatarsal bone of left foot 04/2020    2nd metatarsal    Wears contact lenses        Past Surgical History:   Procedure Laterality Date    BACK SURGERY Left 06/21/2017    microdiscectomy L4-5    DILATION AND CURETTAGE OF UTERUS  1980    DILATION AND CURETTAGE OF UTERUS N/A 04/10/2019    D & C HYSTEROSCOPY & polypectomy performed by Babak Spaulding MD at [address] Left 06/21/2017    MICRODISCECTOMY L4-5, (PRONE), BRYANT TABLE, LIBERTAD FRAME, C-ARM, MICROSCOPE performed by Roni Rivera Left another message that form is complete and just needs a signature from her before it can be faxed.    Clement Camejo MD at [facility] Right 1981    Removal of birth [place]. -RT AXILLIA    CT COLONOSCOPY FLX DX W/COLLJ SPEC WHEN PFRMD N/A 07/16/2018    COLONOSCOPY performed by Michael Gordon MD at [address] Right 01/31/2022    Dr. Maribel Chakraborty ENDOSCOPY N/A 07/16/2018    EGD BIOPSY performed by Michael Gordon MD at [address] Bilateral 06/2020    , patient told from knee down was only 2 veins not three       Current Outpatient Medications on File Prior to Visit   Medication Sig Dispense Refill    acetaminophen (TYLENOL) 500 MG tablet Take 1-2 tabs by mouth nightly prn      oxyCODONE-acetaminophen (PERCOCET) 5-325 MG per tablet Take 1 tablet by mouth as needed for Pain (3 times a week prior to PT).  ibuprofen (ADVIL;MOTRIN) 200 MG tablet Take 400 mg by mouth every 6 hours as needed for Pain      omeprazole (PRILOSEC) 20 MG delayed release capsule Take 1 capsule by mouth Daily 90 capsule 3    vitamin D (CHOLECALCIFEROL) 25 MCG (1000 UT) TABS tablet Take 1,000 Units by mouth three times a week      Multiple Vitamin (MULTIVITAMIN ADULT PO) Take 1 tablet by mouth daily      Calcium 600-200 MG-UNIT TABS Calcium with Vitamin D. Take 1 tablet by mouth every day Except Sunday.  doxycycline hyclate (VIBRA-TABS) 100 MG tablet 2 times a week (Tues & Thurs)       No current facility-administered medications on file prior to visit. Social History     Socioeconomic History    Marital status:       Spouse name: Not on file    Number of children: Not on file    Years of education: Not on file    Highest education level: Not on file   Occupational History    Not on file   Tobacco Use    Smoking status: Never Smoker    Smokeless tobacco: Never Used   Vaping Use    Vaping Use: Never used   Substance and Sexual Activity    Alcohol use: No     Alcohol/week: 0.0 standard drinks    Drug use: No    Cardiovascular: Negative for chest pain, palpitations and leg swelling. Gastrointestinal: Negative for abdominal pain, blood in stool, constipation, diarrhea, nausea and vomiting. Endocrine: Negative for cold intolerance, heat intolerance and polyuria. Genitourinary: Negative for difficulty urinating. Musculoskeletal: Negative for arthralgias (Improving pain to right knee), gait problem, myalgias, neck pain and neck stiffness. Skin: Negative for color change, rash and wound. Neurological: Negative for dizziness, tremors, seizures, weakness, light-headedness, numbness and headaches. Psychiatric/Behavioral: Negative for confusion and hallucinations. The patient is not nervous/anxious. Physical Exam  Vitals and nursing note reviewed. Constitutional:       General: She is not in acute distress. Appearance: Normal appearance. She is well-developed. She is not diaphoretic. HENT:      Head: Normocephalic and atraumatic. Right Ear: External ear normal.      Left Ear: External ear normal.   Eyes:      General:         Right eye: No discharge. Left eye: No discharge. Neck:      Trachea: No tracheal deviation. Cardiovascular:      Rate and Rhythm: Normal rate and regular rhythm. Heart sounds: Normal heart sounds. No murmur heard. No friction rub. No gallop. Pulmonary:      Effort: Pulmonary effort is normal. No respiratory distress. Breath sounds: Normal breath sounds. No stridor. No wheezing, rhonchi or rales. Chest:      Chest wall: No tenderness. Abdominal:      General: Bowel sounds are normal. There is no distension. Palpations: Abdomen is soft. Tenderness: There is no abdominal tenderness. Musculoskeletal:         General: No swelling. Right lower leg: No edema. Left lower leg: No edema. Comments: Incision to right knee well approximated, scant amount of erythremia consistent wear tape was placed.   Does have a follow-up with orthopedics on Friday. Skin:     General: Skin is warm and dry. Capillary Refill: Capillary refill takes less than 2 seconds. Coloration: Skin is not jaundiced or pale. Findings: No rash. Neurological:      General: No focal deficit present. Mental Status: She is alert and oriented to person, place, and time. Cranial Nerves: No cranial nerve deficit. Sensory: No sensory deficit. Coordination: Coordination normal.   Psychiatric:         Mood and Affect: Mood normal.         Behavior: Behavior normal.         Thought Content: Thought content normal.         Judgment: Judgment normal.       Vitals:    03/01/22 1354   BP: 122/70   Site: Left Upper Arm   Position: Sitting   Cuff Size: Large Adult   Pulse: 80   Weight: 157 lb 6.4 oz (71.4 kg)   Height: 5' 5.5\" (1.664 m)       Assessment:  1. Localized osteoarthritis of right knee  Incision looks well. Pain has been well controlled. Has a follow-up with orthopedics on Friday. 2. Vitamin D deficiency  Stable on supplemental vitamin D    3. Rosacea  Stable on as needed Doxy    4. Gastroesophageal reflux disease without esophagitis  Stable at present      Plan:  As noted above. Operative note reviewed  Follow up for routine visit. Call sooner with concerns prior.     Electronically signed by ELANA Calabrese CNP on 3/1/2022 at 2:32 PM

## 2025-01-06 LAB
ALBUMIN/GLOBULIN RATIO: 1.9 G/DL
ALBUMIN: 4.4 G/DL (ref 3.5–5)
ALP BLD-CCNC: 95 UNITS/L (ref 38–126)
ALT SERPL-CCNC: 20 UNITS/L (ref 4–35)
ANION GAP SERPL CALCULATED.3IONS-SCNC: 5.7 MMOL/L (ref 3–11)
AST SERPL-CCNC: 30 UNITS/L (ref 14–36)
BASOPHILS ABSOLUTE: 0.08 X10E3/?L (ref 0–0.3)
BASOPHILS RELATIVE PERCENT: 1.17 % (ref 0–3)
BILIRUB SERPL-MCNC: 0.8 MG/DL (ref 0.2–1.3)
BUN BLDV-MCNC: 12 MG/DL (ref 7–17)
CALCIUM SERPL-MCNC: 9.8 MG/DL (ref 8.4–10.2)
CHLORIDE BLD-SCNC: 106 MMOL/L (ref 98–120)
CO2: 26 MMOL/L (ref 22–31)
CREAT SERPL-MCNC: 0.6 MG/DL (ref 0.5–1)
EOSINOPHILS ABSOLUTE: 0.08 X10E3/?L (ref 0–1.1)
EOSINOPHILS RELATIVE PERCENT: 1.25 % (ref 0–10)
GFR, ESTIMATED: > 60
GLOBULIN: 2.3 G/DL
GLUCOSE: 86 MG/DL (ref 65–105)
HCT VFR BLD CALC: 45.1 % (ref 37–47)
HEMOGLOBIN: 14.7 G/DL (ref 12–16)
LYMPHOCYTES ABSOLUTE: 2.51 X10E3/?L (ref 1–5.5)
LYMPHOCYTES RELATIVE PERCENT: 38.46 % (ref 20–51.1)
MCH RBC QN AUTO: 30.9 PG (ref 28.5–32.5)
MCHC RBC AUTO-ENTMCNC: 32.6 G/DL (ref 32–37)
MCV RBC AUTO: 94.9 FL (ref 80–94)
MONOCYTES ABSOLUTE: 0.65 X10E3/?L (ref 0.1–1)
MONOCYTES RELATIVE PERCENT: 9.94 % (ref 1.7–9.3)
NEUTROPHILS ABSOLUTE: 3.21 X10E3/?L (ref 2–8.1)
NEUTROPHILS RELATIVE PERCENT: 49.17 % (ref 42.2–75.2)
PDW BLD-RTO: 11.8 % (ref 8.5–15.5)
PLATELET # BLD: 245.6 THOU/MM3 (ref 130–400)
POTASSIUM SERPL-SCNC: 4.4 MMOL/L (ref 3.6–5)
RBC # BLD: 4.75 M/UL (ref 4.2–5.4)
SODIUM BLD-SCNC: 138 MMOL/L (ref 135–145)
TOTAL PROTEIN: 6.7 G/DL (ref 6.3–8.2)
VITAMIN D 25-HYDROXY: 69.8 NG/ML (ref 30–100)
WBC # BLD: 6.5 THOU/ML3 (ref 4.8–10.8)

## 2025-01-13 ENCOUNTER — OFFICE VISIT (OUTPATIENT)
Dept: INTERNAL MEDICINE | Age: 75
End: 2025-01-13
Payer: MEDICARE

## 2025-01-13 VITALS
WEIGHT: 148.6 LBS | HEART RATE: 60 BPM | SYSTOLIC BLOOD PRESSURE: 118 MMHG | DIASTOLIC BLOOD PRESSURE: 78 MMHG | BODY MASS INDEX: 24.76 KG/M2 | HEIGHT: 65 IN

## 2025-01-13 DIAGNOSIS — Z12.31 ENCOUNTER FOR SCREENING MAMMOGRAM FOR MALIGNANT NEOPLASM OF BREAST: ICD-10-CM

## 2025-01-13 DIAGNOSIS — F43.29 STRESS AND ADJUSTMENT REACTION: ICD-10-CM

## 2025-01-13 DIAGNOSIS — L71.9 ROSACEA: ICD-10-CM

## 2025-01-13 DIAGNOSIS — Z00.00 MEDICARE ANNUAL WELLNESS VISIT, SUBSEQUENT: Primary | ICD-10-CM

## 2025-01-13 DIAGNOSIS — I50.22 CHRONIC SYSTOLIC (CONGESTIVE) HEART FAILURE (HCC): ICD-10-CM

## 2025-01-13 DIAGNOSIS — K21.9 GASTROESOPHAGEAL REFLUX DISEASE WITHOUT ESOPHAGITIS: ICD-10-CM

## 2025-01-13 DIAGNOSIS — E55.9 VITAMIN D DEFICIENCY: ICD-10-CM

## 2025-01-13 PROCEDURE — 99214 OFFICE O/P EST MOD 30 MIN: CPT | Performed by: NURSE PRACTITIONER

## 2025-01-13 SDOH — ECONOMIC STABILITY: FOOD INSECURITY: WITHIN THE PAST 12 MONTHS, YOU WORRIED THAT YOUR FOOD WOULD RUN OUT BEFORE YOU GOT MONEY TO BUY MORE.: NEVER TRUE

## 2025-01-13 SDOH — ECONOMIC STABILITY: FOOD INSECURITY: WITHIN THE PAST 12 MONTHS, THE FOOD YOU BOUGHT JUST DIDN'T LAST AND YOU DIDN'T HAVE MONEY TO GET MORE.: NEVER TRUE

## 2025-01-13 ASSESSMENT — LIFESTYLE VARIABLES
HOW OFTEN DO YOU HAVE A DRINK CONTAINING ALCOHOL: NEVER
HOW MANY STANDARD DRINKS CONTAINING ALCOHOL DO YOU HAVE ON A TYPICAL DAY: PATIENT DOES NOT DRINK

## 2025-01-13 ASSESSMENT — PATIENT HEALTH QUESTIONNAIRE - PHQ9
SUM OF ALL RESPONSES TO PHQ QUESTIONS 1-9: 0
1. LITTLE INTEREST OR PLEASURE IN DOING THINGS: NOT AT ALL
SUM OF ALL RESPONSES TO PHQ QUESTIONS 1-9: 0
SUM OF ALL RESPONSES TO PHQ9 QUESTIONS 1 & 2: 0
2. FEELING DOWN, DEPRESSED OR HOPELESS: NOT AT ALL

## 2025-01-13 NOTE — PATIENT INSTRUCTIONS
shoulder, or low back pain. Stress is linked to high blood pressure and heart disease.  Stress also harms your emotional health. It can make you henderson, tense, or depressed. Your relationships may suffer, and you may not do well at work or school.  What can you do to manage stress?  You can try these things to help manage stress:   Do something active. Exercise or activity can help reduce stress. Walking is a great way to get started. Even everyday activities such as housecleaning or yard work can help.  Try yoga or tang chi. These techniques combine exercise and meditation. You may need some training at first to learn them.  Do something you enjoy. For example, listen to music or go to a movie. Practice your hobby or do volunteer work.  Meditate. This can help you relax, because you are not worrying about what happened before or what may happen in the future.  Do guided imagery. Imagine yourself in any setting that helps you feel calm. You can use online videos, books, or a teacher to guide you.  Do breathing exercises. For example:  From a standing position, bend forward from the waist with your knees slightly bent. Let your arms dangle close to the floor.  Breathe in slowly and deeply as you return to a standing position. Roll up slowly and lift your head last.  Hold your breath for just a few seconds in the standing position.  Breathe out slowly and bend forward from the waist.  Let your feelings out. Talk, laugh, cry, and express anger when you need to. Talking with supportive friends or family, a counselor, or a bakari leader about your feelings is a healthy way to relieve stress. Avoid discussing your feelings with people who make you feel worse.  Write. It may help to write about things that are bothering you. This helps you find out how much stress you feel and what is causing it. When you know this, you can find better ways to cope.  What can you do to prevent stress?  You might try some of these things to

## 2025-01-13 NOTE — PROGRESS NOTES
Medicare Annual Wellness Visit    Joanna Hannah is here for Medicare AWV, Gastroesophageal Reflux (1 year), and Dysphoric Mood (Follow up)    Assessment & Plan      No follow-ups on file.     Subjective     Patient's complete Health Risk Assessment and screening values have been reviewed and are found in Flowsheets. The following problems were reviewed today and where indicated follow up appointments were made and/or referrals ordered.    Positive Risk Factor Screenings with Interventions:    Fall Risk:  Do you feel unsteady or are you worried about falling? : no  2 or more falls in past year?: no  Fall with injury in past year?: (!) yes     Interventions:    Reviewed medications, home hazards, visual acuity, and co-morbidities that can increase risk for falls  See AVS for additional education material            General HRA Questions:  Select all that apply: (!) Stress    Interventions - Stress:  See A/P for plan and any pertinent orders           Vision Screen:  Do you have difficulty driving, watching TV, or doing any of your daily activities because of your eyesight?: No  Have you had an eye exam within the past year?: (!) No    Interventions:   Patient encouraged to make appointment with their eye specialist    Safety:  Do you have any tripping hazards - loose or unsecured carpets or rugs?: (!) Yes  Do you have non-slip mats or non-slip surfaces or shower bars or grab bars in your shower or bathtub?: (!) No    Interventions:  See AVS for additional education material                 Objective   Vitals:    01/13/25 1342   BP: 118/78   Site: Left Upper Arm   Position: Sitting   Cuff Size: Large Adult   Pulse: 60   Weight: 67.4 kg (148 lb 9.6 oz)   Height: 1.651 m (5' 5\")      Body mass index is 24.73 kg/m².                    Allergies   Allergen Reactions    Codeine Nausea And Vomiting    Hydrocodone Nausea And Vomiting    Other Nausea And Vomiting     \"certain types of anesthesia\" per pt     Prior to Visit 
noted in HPI otherwise negative       Physical Exam  Vitals and nursing note reviewed.   Constitutional:       General: She is not in acute distress.     Appearance: Normal appearance. She is well-developed. She is not diaphoretic.   HENT:      Head: Normocephalic and atraumatic.      Right Ear: External ear normal.      Left Ear: External ear normal.   Eyes:      General:         Right eye: No discharge.         Left eye: No discharge.   Neck:      Thyroid: No thyroid mass or thyromegaly.      Vascular: Normal carotid pulses. No carotid bruit.      Trachea: No tracheal deviation.   Cardiovascular:      Rate and Rhythm: Normal rate and regular rhythm.      Heart sounds: Normal heart sounds. No murmur heard.     No friction rub. No gallop.   Pulmonary:      Effort: Pulmonary effort is normal. No respiratory distress.      Breath sounds: Normal breath sounds. No stridor. No wheezing, rhonchi or rales.   Chest:      Chest wall: No tenderness.   Abdominal:      General: Bowel sounds are normal. There is no distension.      Palpations: Abdomen is soft.      Tenderness: There is no abdominal tenderness.   Musculoskeletal:         General: No swelling.   Skin:     General: Skin is warm and dry.      Capillary Refill: Capillary refill takes less than 2 seconds.      Coloration: Skin is not jaundiced or pale.      Findings: No rash.   Neurological:      General: No focal deficit present.      Mental Status: She is alert and oriented to person, place, and time.      Cranial Nerves: No cranial nerve deficit.      Sensory: No sensory deficit.      Coordination: Coordination normal.   Psychiatric:         Mood and Affect: Mood normal. Mood is not anxious or elated. Affect is not blunt or angry.         Behavior: Behavior normal. Behavior is cooperative.         Thought Content: Thought content normal. Thought content is not paranoid or delusional. Thought content does not include homicidal or suicidal ideation. Thought content

## 2025-03-05 ENCOUNTER — OFFICE VISIT (OUTPATIENT)
Dept: CARDIOLOGY | Age: 75
End: 2025-03-05
Payer: MEDICARE

## 2025-03-05 VITALS
HEART RATE: 81 BPM | DIASTOLIC BLOOD PRESSURE: 76 MMHG | HEIGHT: 65 IN | OXYGEN SATURATION: 96 % | BODY MASS INDEX: 25.33 KG/M2 | SYSTOLIC BLOOD PRESSURE: 134 MMHG | WEIGHT: 152 LBS | RESPIRATION RATE: 12 BRPM

## 2025-03-05 DIAGNOSIS — I44.7 LBBB (LEFT BUNDLE BRANCH BLOCK): Primary | ICD-10-CM

## 2025-03-05 DIAGNOSIS — R03.0 BORDERLINE HYPERTENSION: ICD-10-CM

## 2025-03-05 PROCEDURE — 99214 OFFICE O/P EST MOD 30 MIN: CPT | Performed by: INTERNAL MEDICINE

## 2025-03-05 PROCEDURE — 1159F MED LIST DOCD IN RCRD: CPT | Performed by: INTERNAL MEDICINE

## 2025-03-05 PROCEDURE — 99212 OFFICE O/P EST SF 10 MIN: CPT | Performed by: INTERNAL MEDICINE

## 2025-03-05 PROCEDURE — 1123F ACP DISCUSS/DSCN MKR DOCD: CPT | Performed by: INTERNAL MEDICINE

## 2025-03-05 PROCEDURE — 93005 ELECTROCARDIOGRAM TRACING: CPT | Performed by: INTERNAL MEDICINE

## 2025-03-05 PROCEDURE — 93010 ELECTROCARDIOGRAM REPORT: CPT | Performed by: INTERNAL MEDICINE

## 2025-03-05 NOTE — PROGRESS NOTES
AM  ----------------------------------------------------------------------------        Cath Procedure Summary 1/17/2023       Normal coronary artery disease.   Normal LV systolic function; LVEF 55%.      Recommendations      Medical therapy as needed.   Risk factor modification.   Routine Post Diagnostic Cath orders.    Assessment and plan:    -LBBB- Chronic and stable  -Borderline Stable without medication. Monitor BP at home. Diet and exercise recommended.  -GERD- on PPI  -RTC 6 months.    Silas Lozada MD  [facility]  [phone]

## 2025-06-23 ENCOUNTER — HOSPITAL ENCOUNTER (OUTPATIENT)
Dept: MAMMOGRAPHY | Age: 75
Discharge: HOME | End: 2025-06-25
Payer: MEDICARE

## 2025-06-23 ENCOUNTER — RESULTS FOLLOW-UP (OUTPATIENT)
Dept: INTERNAL MEDICINE | Age: 75
End: 2025-06-23

## 2025-06-23 VITALS — HEIGHT: 65 IN | WEIGHT: 145 LBS | BODY MASS INDEX: 24.16 KG/M2

## 2025-06-23 DIAGNOSIS — Z12.31 ENCOUNTER FOR SCREENING MAMMOGRAM FOR MALIGNANT NEOPLASM OF BREAST: ICD-10-CM

## 2025-06-23 PROCEDURE — 77067 SCR MAMMO BI INCL CAD: CPT

## (undated) DEVICE — GLOVE SURG SZ 8 L12IN THK91MIL BRN LTX FREE POLYCHLOROPRENE

## (undated) DEVICE — BLADE ES L4IN INSUL EDGE

## (undated) DEVICE — MEDI-VAC TUBING CONNECTOR 5-IN-1 STRAIGHT: Brand: CARDINAL HEALTH

## (undated) DEVICE — ELECTRODE PT RET AD L9FT HI MOIST COND ADH HYDRGEL CORDED

## (undated) DEVICE — CYSTO/BLADDER IRRIGATION SET, REGULATING CLAMP

## (undated) DEVICE — BLADE ES ELASTOMERIC COAT INSUL DURABLE BEND UPTO 90DEG

## (undated) DEVICE — CATHETER URETH STR TIP 16 FRX12 IN SMOOTH RND DOVER

## (undated) DEVICE — SUTURE NONABSORBABLE MONOFILAMENT 3-0 PS-1 18 IN BLK ETHILON 1663H

## (undated) DEVICE — DRAPE IRRIG FLD WRM W44XL44IN W/ AORN STD PRTBL INTRATEMP

## (undated) DEVICE — CONNECTOR TBNG AUX H2O JET DISP FOR OLY 160/180 SER

## (undated) DEVICE — GOWN,AURORA,NONREINFORCED,LARGE: Brand: MEDLINE

## (undated) DEVICE — STERILE LATEX POWDER-FREE SURGICAL GLOVESWITH NITRILE COATING: Brand: PROTEXIS

## (undated) DEVICE — SYRINGE BLB 2 PC STER 50

## (undated) DEVICE — SUTURE VCRL SZ 2-0 L18IN ABSRB VLT L26MM SH 1/2 CIR J775D

## (undated) DEVICE — POUCH INSTR W40XL26IN BUTT FLD COLL FLTR DRNGE PRT

## (undated) DEVICE — 60 ML SYRINGE REGULAR TIP: Brand: MONOJECT

## (undated) DEVICE — DRAPE,REIN 53X77,STERILE: Brand: MEDLINE

## (undated) DEVICE — LINE SAMP O2 6.5FT W/FEMALE CONN F/ADULT CAPNOLINE PLUS

## (undated) DEVICE — TRAY CATHETER 16FR F INCLUDE BARDX IC COMPLT CARE DRNGE BG

## (undated) DEVICE — GLOVE ORANGE PI 8   MSG9080

## (undated) DEVICE — CODMAN® SURGICAL PATTIES 1/2" X 3" (1.27CM X 7.62CM): Brand: CODMAN®

## (undated) DEVICE — EMESIS BASIN: Brand: DEROYAL

## (undated) DEVICE — GARMENT COMPR L FOR 23IN CALF FLOTRN

## (undated) DEVICE — PACK,LITHOTOMY,PK I: Brand: MEDLINE

## (undated) DEVICE — SHEET, ORTHO, SPLIT, STERILE: Brand: MEDLINE

## (undated) DEVICE — DRAPE SHEET, X-LARGE: Brand: CONVERTORS

## (undated) DEVICE — PAD MATERNITY CURITY ADH STRIP DISP

## (undated) DEVICE — SOLUTION SCRB 4OZ 7.5% POVIDONE IOD FLIP TOP CAP

## (undated) DEVICE — DRAPE MICSCP W117XL305CM DIA65MM LENS W VARI LENS2 FOR LEICA

## (undated) DEVICE — 3M™ IOBAN™ 2 ANTIMICROBIAL INCISE DRAPE 6650EZ: Brand: IOBAN™ 2

## (undated) DEVICE — Z DISCONTINUED USE 2273271 SOLUTION PREP 4OZ 10% POVIDONE IOD BTL FLIP TOP CAP

## (undated) DEVICE — SOLUTION IV 1000ML 0.9% SOD CHL PH 5 INJ USP VIAFLX PLAS

## (undated) DEVICE — PEN: MARKING STD 100/CS: Brand: MEDICAL ACTION INDUSTRIES

## (undated) DEVICE — PACK PROCEDURE SURG LUMBAR SPINE SVMMC

## (undated) DEVICE — COLONOSCOPE ENDOSCP ABSORBENT SM PEDIATRIC 104 MM VISION

## (undated) DEVICE — 1000ML,PRESSURE INFUSER W/STOPCOCK: Brand: MEDLINE

## (undated) DEVICE — JELLY LUBRICATING 4OZ FLIP TOP TB E Z

## (undated) DEVICE — GARMENT COMPR STD FOR 17IN CALF UNIF THER FLOTRN

## (undated) DEVICE — DISCONTINUED USE 419147 KIT ENDOSCOPY CUSTOM PACK

## (undated) DEVICE — DRAPE C ARM UNIV W41XL74IN CLR PLAS XR VELC CLSR POLY STRP

## (undated) DEVICE — PREP SOL PVP IODINE 4%  4 OZ/BTL

## (undated) DEVICE — NEURO SPONGES: Brand: DEROYAL

## (undated) DEVICE — BIPOLAR SEALER 23-113-1 AQM 2.3 OM NEURO: Brand: AQUAMANTYS ®

## (undated) DEVICE — 1000 S-DRAPE TOWEL DRAPE 10/BX: Brand: STERI-DRAPE™

## (undated) DEVICE — TOWEL SURG W16XL26IN WHT NONFENESTRATED ST 4 PER PK

## (undated) DEVICE — OIL CARTRIDGE: Brand: CORE, MAESTRO

## (undated) DEVICE — GAUZE,SPONGE,FLUFF,6"X6.75",STRL,5/TRAY: Brand: MEDLINE

## (undated) DEVICE — CODMAN® SURGICAL PATTIES 1" X 1" (2.54CM X 2.54CM): Brand: CODMAN®

## (undated) DEVICE — 3.0MM PRECISION NEURO (MATCH HEAD)

## (undated) DEVICE — C-ARMOR C-ARM EQUIPMENT COVERS CLEAR STERILE UNIVERSAL FIT 12 PER CASE: Brand: C-ARMOR

## (undated) DEVICE — PROTECTOR ULN NRV PUR FOAM HK LOOP STRP ANATOMICALLY

## (undated) DEVICE — 1200CC GUARDIAN II: Brand: GUARDIAN

## (undated) DEVICE — GLOVE SURG SZ 6 THK91MIL LTX FREE SYN POLYISOPRENE ANTI

## (undated) DEVICE — CONTROL SYRINGE LUER-LOCK TIP: Brand: MONOJECT

## (undated) DEVICE — PAD,NON-ADHERENT,3X8,STERILE,LF,1/PK: Brand: MEDLINE

## (undated) DEVICE — SEALANT HEMSTAT 5ML HUM FIBRIN THROM 2 VI APPL DEV EVICEL

## (undated) DEVICE — NEEDLE SPNL L3.5IN PNK HUB S STL REG WALL FIT STYL W/ QNCKE

## (undated) DEVICE — DIFFUSER: Brand: CORE, MAESTRO

## (undated) DEVICE — 3M™ WARMING BLANKET, UPPER BODY, 10 PER CASE, 42268: Brand: BAIR HUGGER™

## (undated) DEVICE — GLOVE SURG SZ 65 THK91MIL LTX FREE SYN POLYISOPRENE

## (undated) DEVICE — CONVERTED USE 338908 SPONGES LAP 18X18 ST

## (undated) DEVICE — COVER LT HNDL BLU PLAS

## (undated) DEVICE — FORCEPS BX L240CM JAW DIA2.4MM ORNG L CAP W/ NDL DISP RAD

## (undated) DEVICE — CODMAN® SURGICAL PATTIES 1/2" X 1/2" (1.27CM X 1.27CM): Brand: CODMAN®

## (undated) DEVICE — SOLUTION SURG PREP POV IOD 7.5% 4 OZ

## (undated) DEVICE — COVER,MAYO STAND,STERILE: Brand: MEDLINE

## (undated) DEVICE — NEEDLE SPNL 22GA L3.5IN BLK HUB S STL REG WALL FIT STYL W/

## (undated) DEVICE — BITE BLOCK W/VELCRO STRAP

## (undated) DEVICE — Z DISCONTINUED BY MEDLINE USE 2711682 TRAY SKIN PREP DRY W/ PREM GLV

## (undated) DEVICE — KIT EVAC 400CC DIA2.3MM PVC RELIABLE SUCT DRNGE 3 SPR RND H

## (undated) DEVICE — MEDI-VAC NON-CONDUCTIVE SUCTION TUBING: Brand: CARDINAL HEALTH

## (undated) DEVICE — SUTURE VCRL + SZ 0 L18IN ABSRB VLT L36MM CT-1 1/2 CIR VCP740D

## (undated) DEVICE — TOWEL,OR,DSP,ST,BLUE,STD,4/PK,20PK/CS: Brand: MEDLINE

## (undated) DEVICE — Device

## (undated) DEVICE — GAUZE,SPONGE,4"X4",16PLY,XRAY,STRL,LF: Brand: MEDLINE

## (undated) DEVICE — 60 ML SYRINGE LUER-LOCK TIP: Brand: MONOJECT

## (undated) DEVICE — GOWN,AURORA,NONRNF,XL,30/CS: Brand: MEDLINE